# Patient Record
Sex: MALE | Race: BLACK OR AFRICAN AMERICAN | NOT HISPANIC OR LATINO | URBAN - METROPOLITAN AREA
[De-identification: names, ages, dates, MRNs, and addresses within clinical notes are randomized per-mention and may not be internally consistent; named-entity substitution may affect disease eponyms.]

---

## 2017-07-28 ENCOUNTER — INPATIENT (INPATIENT)
Facility: HOSPITAL | Age: 72
LOS: 5 days | Discharge: HOME | End: 2017-08-03
Attending: INTERNAL MEDICINE

## 2017-07-28 DIAGNOSIS — K59.00 CONSTIPATION, UNSPECIFIED: ICD-10-CM

## 2017-08-05 DIAGNOSIS — I12.9 HYPERTENSIVE CHRONIC KIDNEY DISEASE WITH STAGE 1 THROUGH STAGE 4 CHRONIC KIDNEY DISEASE, OR UNSPECIFIED CHRONIC KIDNEY DISEASE: ICD-10-CM

## 2017-08-05 DIAGNOSIS — F43.10 POST-TRAUMATIC STRESS DISORDER, UNSPECIFIED: ICD-10-CM

## 2017-08-05 DIAGNOSIS — N17.9 ACUTE KIDNEY FAILURE, UNSPECIFIED: ICD-10-CM

## 2017-08-05 DIAGNOSIS — Z87.891 PERSONAL HISTORY OF NICOTINE DEPENDENCE: ICD-10-CM

## 2017-08-05 DIAGNOSIS — D53.8 OTHER SPECIFIED NUTRITIONAL ANEMIAS: ICD-10-CM

## 2017-08-05 DIAGNOSIS — C23 MALIGNANT NEOPLASM OF GALLBLADDER: ICD-10-CM

## 2017-08-05 DIAGNOSIS — Z86.73 PERSONAL HISTORY OF TRANSIENT ISCHEMIC ATTACK (TIA), AND CEREBRAL INFARCTION WITHOUT RESIDUAL DEFICITS: ICD-10-CM

## 2017-08-05 DIAGNOSIS — E46 UNSPECIFIED PROTEIN-CALORIE MALNUTRITION: ICD-10-CM

## 2017-08-05 DIAGNOSIS — E83.52 HYPERCALCEMIA: ICD-10-CM

## 2017-08-05 DIAGNOSIS — K31.1 ADULT HYPERTROPHIC PYLORIC STENOSIS: ICD-10-CM

## 2017-08-05 DIAGNOSIS — N18.9 CHRONIC KIDNEY DISEASE, UNSPECIFIED: ICD-10-CM

## 2017-08-05 DIAGNOSIS — N18.4 CHRONIC KIDNEY DISEASE, STAGE 4 (SEVERE): ICD-10-CM

## 2017-08-05 DIAGNOSIS — K82.9 DISEASE OF GALLBLADDER, UNSPECIFIED: ICD-10-CM

## 2017-08-05 DIAGNOSIS — N40.0 BENIGN PROSTATIC HYPERPLASIA WITHOUT LOWER URINARY TRACT SYMPTOMS: ICD-10-CM

## 2017-08-05 DIAGNOSIS — C78.7 SECONDARY MALIGNANT NEOPLASM OF LIVER AND INTRAHEPATIC BILE DUCT: ICD-10-CM

## 2017-08-05 DIAGNOSIS — K59.00 CONSTIPATION, UNSPECIFIED: ICD-10-CM

## 2017-08-05 DIAGNOSIS — R59.0 LOCALIZED ENLARGED LYMPH NODES: ICD-10-CM

## 2017-08-14 ENCOUNTER — APPOINTMENT (OUTPATIENT)
Dept: HEMATOLOGY ONCOLOGY | Facility: CLINIC | Age: 72
End: 2017-08-14

## 2017-08-14 VITALS
BODY MASS INDEX: 27.63 KG/M2 | SYSTOLIC BLOOD PRESSURE: 159 MMHG | DIASTOLIC BLOOD PRESSURE: 83 MMHG | WEIGHT: 193 LBS | HEART RATE: 76 BPM | HEIGHT: 70 IN | RESPIRATION RATE: 16 BRPM | TEMPERATURE: 96.8 F

## 2017-08-14 DIAGNOSIS — E78.5 HYPERLIPIDEMIA, UNSPECIFIED: ICD-10-CM

## 2017-08-14 DIAGNOSIS — Z80.0 FAMILY HISTORY OF MALIGNANT NEOPLASM OF DIGESTIVE ORGANS: ICD-10-CM

## 2017-08-14 DIAGNOSIS — F41.9 ANXIETY DISORDER, UNSPECIFIED: ICD-10-CM

## 2017-08-14 DIAGNOSIS — Z87.898 PERSONAL HISTORY OF OTHER SPECIFIED CONDITIONS: ICD-10-CM

## 2017-08-14 DIAGNOSIS — Z86.59 PERSONAL HISTORY OF OTHER MENTAL AND BEHAVIORAL DISORDERS: ICD-10-CM

## 2017-08-14 DIAGNOSIS — Z86.73 PERSONAL HISTORY OF TRANSIENT ISCHEMIC ATTACK (TIA), AND CEREBRAL INFARCTION W/OUT RESIDUAL DEFICITS: ICD-10-CM

## 2017-08-14 DIAGNOSIS — Z87.438 PERSONAL HISTORY OF OTHER DISEASES OF MALE GENITAL ORGANS: ICD-10-CM

## 2017-08-14 DIAGNOSIS — Z80.3 FAMILY HISTORY OF MALIGNANT NEOPLASM OF BREAST: ICD-10-CM

## 2017-08-14 DIAGNOSIS — I10 ESSENTIAL (PRIMARY) HYPERTENSION: ICD-10-CM

## 2017-08-14 DIAGNOSIS — Z87.891 PERSONAL HISTORY OF NICOTINE DEPENDENCE: ICD-10-CM

## 2017-08-14 DIAGNOSIS — Z77.098 CONTACT WITH AND (SUSPECTED) EXPOSURE TO OTHER HAZARDOUS, CHIEFLY NONMEDICINAL, CHEMICALS: ICD-10-CM

## 2017-08-14 RX ORDER — FINASTERIDE 1 MG/1
1 TABLET ORAL
Refills: 0 | Status: ACTIVE | COMMUNITY

## 2017-08-14 RX ORDER — DOCUSATE SODIUM 100 MG/1
100 CAPSULE ORAL
Refills: 0 | Status: ACTIVE | COMMUNITY

## 2017-08-14 RX ORDER — ALPRAZOLAM 0.25 MG/1
0.25 TABLET ORAL
Qty: 2 | Refills: 0 | Status: ACTIVE | COMMUNITY
Start: 2017-08-14 | End: 1900-01-01

## 2017-08-14 RX ORDER — ASPIRIN 81 MG/1
81 TABLET, CHEWABLE ORAL
Refills: 0 | Status: ACTIVE | COMMUNITY

## 2017-08-14 RX ORDER — TERAZOSIN 10 MG/1
10 CAPSULE ORAL
Refills: 0 | Status: ACTIVE | COMMUNITY

## 2017-08-14 RX ORDER — ATORVASTATIN CALCIUM 10 MG/1
10 TABLET, FILM COATED ORAL
Refills: 0 | Status: ACTIVE | COMMUNITY

## 2017-08-14 RX ORDER — TRAZODONE HYDROCHLORIDE 50 MG/1
50 TABLET ORAL
Refills: 0 | Status: ACTIVE | COMMUNITY

## 2017-08-14 RX ORDER — SENNOSIDES 8.6 MG TABLETS 8.6 MG/1
8.6 TABLET ORAL
Refills: 0 | Status: ACTIVE | COMMUNITY

## 2017-08-15 RX ORDER — PROCHLORPERAZINE MALEATE 10 MG/1
10 TABLET ORAL
Qty: 120 | Refills: 3 | Status: ACTIVE | COMMUNITY
Start: 2017-08-14 | End: 1900-01-01

## 2017-08-19 ENCOUNTER — OUTPATIENT (OUTPATIENT)
Dept: OUTPATIENT SERVICES | Facility: HOSPITAL | Age: 72
LOS: 1 days | Discharge: HOME | End: 2017-08-19

## 2017-08-19 DIAGNOSIS — C7A.8 OTHER MALIGNANT NEUROENDOCRINE TUMORS: ICD-10-CM

## 2017-08-19 DIAGNOSIS — K59.00 CONSTIPATION, UNSPECIFIED: ICD-10-CM

## 2017-08-21 ENCOUNTER — RX RENEWAL (OUTPATIENT)
Age: 72
End: 2017-08-21

## 2017-08-21 ENCOUNTER — APPOINTMENT (OUTPATIENT)
Dept: HEMATOLOGY ONCOLOGY | Facility: CLINIC | Age: 72
End: 2017-08-21

## 2017-08-21 ENCOUNTER — OTHER (OUTPATIENT)
Age: 72
End: 2017-08-21

## 2017-08-21 RX ORDER — ONDANSETRON 8 MG/1
8 TABLET ORAL EVERY 8 HOURS
Qty: 30 | Refills: 3 | Status: ACTIVE | COMMUNITY
Start: 2017-08-21 | End: 1900-01-01

## 2017-08-22 ENCOUNTER — APPOINTMENT (OUTPATIENT)
Dept: INFUSION THERAPY | Facility: CLINIC | Age: 72
End: 2017-08-22

## 2017-08-22 LAB
BASOPHILS # BLD: 0.12 TH/MM3
BASOPHILS NFR BLD: 1.6 %
EOSINOPHIL # BLD: 0.13 TH/MM3
EOSINOPHIL NFR BLD: 1.7 %
ERYTHROCYTE [DISTWIDTH] IN BLOOD BY AUTOMATED COUNT: 17.7 %
GRANULOCYTES # BLD: 4.55 TH/MM3
GRANULOCYTES NFR BLD: 59 %
HCT VFR BLD AUTO: 28.8 %
HGB BLD-MCNC: 10.1 G/DL
IMM GRANULOCYTES # BLD: 0.14 TH/MM3
IMM GRANULOCYTES NFR BLD: 1.8 %
LYMPHOCYTES # BLD: 1.99 TH/MM3
LYMPHOCYTES NFR BLD: 25.8 %
MCH RBC QN AUTO: 26.4 PG
MCHC RBC AUTO-ENTMCNC: 35.1 G/DL
MCV RBC AUTO: 75.2 FL
MONOCYTES # BLD: 0.78 TH/MM3
MONOCYTES NFR BLD: 10.1 %
PLATELET # BLD: 219 TH/MM3
PMV BLD AUTO: 11.7 FL
RBC # BLD AUTO: 3.83 MIL/MM3
WBC # BLD: 7.71 TH/MM3

## 2017-08-23 ENCOUNTER — RX RENEWAL (OUTPATIENT)
Age: 72
End: 2017-08-23

## 2017-08-23 ENCOUNTER — APPOINTMENT (OUTPATIENT)
Dept: INFUSION THERAPY | Facility: CLINIC | Age: 72
End: 2017-08-23

## 2017-08-23 RX ORDER — CHLORPROMAZINE HYDROCHLORIDE 25 MG/1
25 TABLET, SUGAR COATED ORAL 3 TIMES DAILY
Qty: 12 | Refills: 0 | Status: ACTIVE | COMMUNITY
Start: 2017-08-23 | End: 1900-01-01

## 2017-08-24 ENCOUNTER — APPOINTMENT (OUTPATIENT)
Dept: INFUSION THERAPY | Facility: CLINIC | Age: 72
End: 2017-08-24

## 2017-08-24 ENCOUNTER — OUTPATIENT (OUTPATIENT)
Dept: OUTPATIENT SERVICES | Facility: HOSPITAL | Age: 72
LOS: 1 days | Discharge: HOME | End: 2017-08-24

## 2017-08-24 DIAGNOSIS — K59.00 CONSTIPATION, UNSPECIFIED: ICD-10-CM

## 2017-08-24 DIAGNOSIS — C7A.8 OTHER MALIGNANT NEUROENDOCRINE TUMORS: ICD-10-CM

## 2017-08-25 ENCOUNTER — APPOINTMENT (OUTPATIENT)
Dept: INFUSION THERAPY | Facility: CLINIC | Age: 72
End: 2017-08-25

## 2017-08-28 DIAGNOSIS — Z02.9 ENCOUNTER FOR ADMINISTRATIVE EXAMINATIONS, UNSPECIFIED: ICD-10-CM

## 2017-09-12 ENCOUNTER — APPOINTMENT (OUTPATIENT)
Dept: INFUSION THERAPY | Facility: CLINIC | Age: 72
End: 2017-09-12

## 2017-09-12 ENCOUNTER — RESULT REVIEW (OUTPATIENT)
Age: 72
End: 2017-09-12

## 2017-09-12 ENCOUNTER — APPOINTMENT (OUTPATIENT)
Dept: HEMATOLOGY ONCOLOGY | Facility: CLINIC | Age: 72
End: 2017-09-12

## 2017-09-12 VITALS
HEART RATE: 78 BPM | TEMPERATURE: 97.7 F | WEIGHT: 177 LBS | HEIGHT: 70 IN | SYSTOLIC BLOOD PRESSURE: 161 MMHG | DIASTOLIC BLOOD PRESSURE: 76 MMHG | BODY MASS INDEX: 25.34 KG/M2

## 2017-09-13 ENCOUNTER — APPOINTMENT (OUTPATIENT)
Dept: INFUSION THERAPY | Facility: CLINIC | Age: 72
End: 2017-09-13

## 2017-09-13 LAB
ALBUMIN SERPL-MCNC: 3.7 G/DL
ALBUMIN/GLOB SERPL: 1.42
ALP SERPL-CCNC: 96 IU/L
ALT SERPL-CCNC: 17 IU/L
ANION GAP SERPL CALC-SCNC: 9 MEQ/L
AST SERPL-CCNC: 22 IU/L
BASOPHILS # BLD: 0.12 TH/MM3
BASOPHILS NFR BLD: 1 %
BILIRUB SERPL-MCNC: 0.7 MG/DL
BUN SERPL-MCNC: 13 MG/DL
BUN/CREAT SERPL: 6.9 %
CALCIUM SERPL-MCNC: 10.2 MG/DL
CHLORIDE SERPL-SCNC: 107 MEQ/L
CO2 SERPL-SCNC: 24 MEQ/L
CREAT SERPL-MCNC: 1.88 MG/DL
EOSINOPHIL # BLD: 0.03 TH/MM3
EOSINOPHIL NFR BLD: 0.2 %
ERYTHROCYTE [DISTWIDTH] IN BLOOD BY AUTOMATED COUNT: 20.5 %
FERRITIN SERPL-MCNC: 164 NG/ML
GFR SERPL CREATININE-BSD FRML MDRD: 43
GLUCOSE SERPL-MCNC: 89 MG/DL
GRANULOCYTES # BLD: 7.96 TH/MM3
GRANULOCYTES NFR BLD: 63.9 %
HCT VFR BLD AUTO: 25.6 %
HGB BLD-MCNC: 8.9 G/DL
IMM GRANULOCYTES # BLD: 0.24 TH/MM3
IMM GRANULOCYTES NFR BLD: 1.9 %
IRON SERPL-MCNC: 37 UG/DL
LYMPHOCYTES # BLD: 2.5 TH/MM3
LYMPHOCYTES NFR BLD: 20 %
MCH RBC QN AUTO: 26.9 PG
MCHC RBC AUTO-ENTMCNC: 34.8 G/DL
MCV RBC AUTO: 77.3 FL
MONOCYTES # BLD: 1.62 TH/MM3
MONOCYTES NFR BLD: 13 %
PLATELET # BLD: 347 TH/MM3
PMV BLD AUTO: 10.8 FL
POTASSIUM SERPL-SCNC: 4.4 MMOL/L
PROT SERPL-MCNC: 6.3 G/DL
RBC # BLD AUTO: 3.31 MIL/MM3
SODIUM SERPL-SCNC: 140 MEQ/L
TIBC SERPL-MCNC: 294 UG/DL
WBC # BLD: 12.47 TH/MM3

## 2017-09-14 ENCOUNTER — APPOINTMENT (OUTPATIENT)
Dept: INFUSION THERAPY | Facility: CLINIC | Age: 72
End: 2017-09-14

## 2017-09-15 ENCOUNTER — APPOINTMENT (OUTPATIENT)
Dept: INFUSION THERAPY | Facility: CLINIC | Age: 72
End: 2017-09-15

## 2017-10-03 ENCOUNTER — RESULT REVIEW (OUTPATIENT)
Age: 72
End: 2017-10-03

## 2017-10-03 ENCOUNTER — APPOINTMENT (OUTPATIENT)
Dept: HEMATOLOGY ONCOLOGY | Facility: CLINIC | Age: 72
End: 2017-10-03

## 2017-10-03 ENCOUNTER — APPOINTMENT (OUTPATIENT)
Dept: INFUSION THERAPY | Facility: CLINIC | Age: 72
End: 2017-10-03

## 2017-10-03 VITALS
RESPIRATION RATE: 14 BRPM | BODY MASS INDEX: 25.91 KG/M2 | WEIGHT: 181 LBS | TEMPERATURE: 98.4 F | DIASTOLIC BLOOD PRESSURE: 67 MMHG | HEART RATE: 72 BPM | SYSTOLIC BLOOD PRESSURE: 155 MMHG | HEIGHT: 70 IN

## 2017-10-03 LAB
BASOPHILS # BLD: 0.06 TH/MM3
BASOPHILS NFR BLD: 0.6 %
EOSINOPHIL # BLD: 0.07 TH/MM3
EOSINOPHIL NFR BLD: 0.7 %
ERYTHROCYTE [DISTWIDTH] IN BLOOD BY AUTOMATED COUNT: 26.1 %
GRANULOCYTES # BLD: 6.55 TH/MM3
GRANULOCYTES NFR BLD: 64.1 %
HCT VFR BLD AUTO: 23 %
HGB BLD-MCNC: 7.8 G/DL
IMM GRANULOCYTES # BLD: 0.12 TH/MM3
IMM GRANULOCYTES NFR BLD: 1.2 %
LYMPHOCYTES # BLD: 2.26 TH/MM3
LYMPHOCYTES NFR BLD: 22.2 %
MCH RBC QN AUTO: 28.1 PG
MCHC RBC AUTO-ENTMCNC: 33.9 G/DL
MCV RBC AUTO: 82.7 FL
MONOCYTES # BLD: 1.14 TH/MM3
MONOCYTES NFR BLD: 11.2 %
PLATELET # BLD: 187 TH/MM3
PMV BLD AUTO: 9.9 FL
RBC # BLD AUTO: 2.78 MIL/MM3
WBC # BLD: 10.2 TH/MM3

## 2017-10-04 ENCOUNTER — OTHER (OUTPATIENT)
Age: 72
End: 2017-10-04

## 2017-10-04 ENCOUNTER — APPOINTMENT (OUTPATIENT)
Dept: INFUSION THERAPY | Facility: CLINIC | Age: 72
End: 2017-10-04

## 2017-10-04 LAB
ALBUMIN SERPL-MCNC: 3.8 G/DL
ALBUMIN/GLOB SERPL: 1.41
ALP SERPL-CCNC: 83 IU/L
ALT SERPL-CCNC: 15 IU/L
ANION GAP SERPL CALC-SCNC: 10 MEQ/L
AST SERPL-CCNC: 27 IU/L
BILIRUB SERPL-MCNC: 0.6 MG/DL
BUN SERPL-MCNC: 19 MG/DL
BUN/CREAT SERPL: 8.8 %
CALCIUM SERPL-MCNC: 9.4 MG/DL
CHLORIDE SERPL-SCNC: 105 MEQ/L
CO2 SERPL-SCNC: 25 MEQ/L
CREAT SERPL-MCNC: 2.16 MG/DL
GFR SERPL CREATININE-BSD FRML MDRD: 37
GLUCOSE SERPL-MCNC: 103 MG/DL
POTASSIUM SERPL-SCNC: 4.3 MMOL/L
PROT SERPL-MCNC: 6.5 G/DL
SODIUM SERPL-SCNC: 140 MEQ/L

## 2017-10-05 ENCOUNTER — APPOINTMENT (OUTPATIENT)
Dept: INFUSION THERAPY | Facility: CLINIC | Age: 72
End: 2017-10-05

## 2017-10-06 ENCOUNTER — APPOINTMENT (OUTPATIENT)
Dept: INFUSION THERAPY | Facility: CLINIC | Age: 72
End: 2017-10-06

## 2017-10-12 LAB
ABO + RH BLD: NORMAL
BLD GP AB SCN SERPL QL: NORMAL

## 2017-10-20 ENCOUNTER — OUTPATIENT (OUTPATIENT)
Dept: OUTPATIENT SERVICES | Facility: HOSPITAL | Age: 72
LOS: 1 days | Discharge: HOME | End: 2017-10-20

## 2017-10-20 DIAGNOSIS — K59.00 CONSTIPATION, UNSPECIFIED: ICD-10-CM

## 2017-10-20 DIAGNOSIS — C7A.8 OTHER MALIGNANT NEUROENDOCRINE TUMORS: ICD-10-CM

## 2017-10-24 ENCOUNTER — APPOINTMENT (OUTPATIENT)
Dept: HEMATOLOGY ONCOLOGY | Facility: CLINIC | Age: 72
End: 2017-10-24

## 2017-10-24 ENCOUNTER — APPOINTMENT (OUTPATIENT)
Dept: INFUSION THERAPY | Facility: CLINIC | Age: 72
End: 2017-10-24

## 2017-10-24 VITALS
BODY MASS INDEX: 25.77 KG/M2 | RESPIRATION RATE: 14 BRPM | TEMPERATURE: 97.5 F | SYSTOLIC BLOOD PRESSURE: 146 MMHG | HEART RATE: 72 BPM | WEIGHT: 180 LBS | DIASTOLIC BLOOD PRESSURE: 75 MMHG | HEIGHT: 70 IN

## 2017-10-24 DIAGNOSIS — M25.512 PAIN IN LEFT SHOULDER: ICD-10-CM

## 2017-10-24 LAB
ALBUMIN SERPL-MCNC: 3.8 G/DL
ALBUMIN/GLOB SERPL: 1.36
ALP SERPL-CCNC: 82 IU/L
ALT SERPL-CCNC: 16 IU/L
ANION GAP SERPL CALC-SCNC: 7 MEQ/L
AST SERPL-CCNC: 24 IU/L
BASOPHILS # BLD: 0.03 TH/MM3
BASOPHILS NFR BLD: 0.4 %
BILIRUB SERPL-MCNC: 0.5 MG/DL
BUN SERPL-MCNC: 9 MG/DL
BUN/CREAT SERPL: 5.4 %
CALCIUM SERPL-MCNC: 9.7 MG/DL
CHLORIDE SERPL-SCNC: 106 MEQ/L
CO2 SERPL-SCNC: 26 MEQ/L
CREAT SERPL-MCNC: 1.67 MG/DL
EOSINOPHIL # BLD: 0.11 TH/MM3
EOSINOPHIL NFR BLD: 1.3 %
ERYTHROCYTE [DISTWIDTH] IN BLOOD BY AUTOMATED COUNT: 23.9 %
GFR SERPL CREATININE-BSD FRML MDRD: 49
GLUCOSE SERPL-MCNC: 116 MG/DL
GRANULOCYTES # BLD: 5.11 TH/MM3
GRANULOCYTES NFR BLD: 61.5 %
HCT VFR BLD AUTO: 26 %
HGB BLD-MCNC: 8.8 G/DL
IMM GRANULOCYTES # BLD: 0.01 TH/MM3
IMM GRANULOCYTES NFR BLD: 0.1 %
LYMPHOCYTES # BLD: 2.01 TH/MM3
LYMPHOCYTES NFR BLD: 24.2 %
MCH RBC QN AUTO: 29.6 PG
MCHC RBC AUTO-ENTMCNC: 33.8 G/DL
MCV RBC AUTO: 87.5 FL
MONOCYTES # BLD: 1.04 TH/MM3
MONOCYTES NFR BLD: 12.5 %
PLATELET # BLD: 127 TH/MM3
PMV BLD AUTO: 9 FL
POTASSIUM SERPL-SCNC: 4.4 MMOL/L
PROT SERPL-MCNC: 6.6 G/DL
RBC # BLD AUTO: 2.97 MIL/MM3
SODIUM SERPL-SCNC: 139 MEQ/L
WBC # BLD: 8.31 TH/MM3

## 2017-10-25 ENCOUNTER — APPOINTMENT (OUTPATIENT)
Dept: INFUSION THERAPY | Facility: CLINIC | Age: 72
End: 2017-10-25

## 2017-10-26 ENCOUNTER — APPOINTMENT (OUTPATIENT)
Dept: INFUSION THERAPY | Facility: CLINIC | Age: 72
End: 2017-10-26

## 2017-10-27 ENCOUNTER — APPOINTMENT (OUTPATIENT)
Dept: INFUSION THERAPY | Facility: CLINIC | Age: 72
End: 2017-10-27

## 2017-10-31 ENCOUNTER — APPOINTMENT (OUTPATIENT)
Dept: HEMATOLOGY ONCOLOGY | Facility: CLINIC | Age: 72
End: 2017-10-31

## 2017-10-31 LAB
BASOPHILS # BLD: 0.04 TH/MM3
BASOPHILS NFR BLD: 0.4 %
EOSINOPHIL # BLD: 0.11 TH/MM3
EOSINOPHIL NFR BLD: 1.2 %
ERYTHROCYTE [DISTWIDTH] IN BLOOD BY AUTOMATED COUNT: 22.7 %
GRANULOCYTES # BLD: 7.06 TH/MM3
GRANULOCYTES NFR BLD: 74.8 %
HCT VFR BLD AUTO: 23.5 %
HGB BLD-MCNC: 8 G/DL
IMM GRANULOCYTES # BLD: 0.25 TH/MM3
IMM GRANULOCYTES NFR BLD: 2.7 %
LYMPHOCYTES # BLD: 1.49 TH/MM3
LYMPHOCYTES NFR BLD: 15.8 %
MCH RBC QN AUTO: 30.1 PG
MCHC RBC AUTO-ENTMCNC: 34 G/DL
MCV RBC AUTO: 88.3 FL
MONOCYTES # BLD: 0.48 TH/MM3
MONOCYTES NFR BLD: 5.1 %
PLATELET # BLD: 190 TH/MM3
PMV BLD AUTO: 9.8 FL
RBC # BLD AUTO: 2.66 MIL/MM3
WBC # BLD: 9.43 TH/MM3

## 2017-11-07 ENCOUNTER — APPOINTMENT (OUTPATIENT)
Dept: HEMATOLOGY ONCOLOGY | Facility: CLINIC | Age: 72
End: 2017-11-07

## 2017-11-07 LAB
BASOPHILS # BLD: 0.03 TH/MM3
BASOPHILS NFR BLD: 0.4 %
EOSINOPHIL # BLD: 0.13 TH/MM3
EOSINOPHIL NFR BLD: 1.7 %
ERYTHROCYTE [DISTWIDTH] IN BLOOD BY AUTOMATED COUNT: 23 %
GRANULOCYTES # BLD: 4.55 TH/MM3
GRANULOCYTES NFR BLD: 60.1 %
HCT VFR BLD AUTO: 23.9 %
HGB BLD-MCNC: 8.1 G/DL
IMM GRANULOCYTES # BLD: 0.05 TH/MM3
IMM GRANULOCYTES NFR BLD: 0.7 %
LYMPHOCYTES # BLD: 2.02 TH/MM3
LYMPHOCYTES NFR BLD: 26.7 %
MCH RBC QN AUTO: 30.3 PG
MCHC RBC AUTO-ENTMCNC: 33.9 G/DL
MCV RBC AUTO: 89.5 FL
MONOCYTES # BLD: 0.79 TH/MM3
MONOCYTES NFR BLD: 10.4 %
PLATELET # BLD: 96 TH/MM3
PMV BLD AUTO: 8.9 FL
RBC # BLD AUTO: 2.67 MIL/MM3
WBC # BLD: 7.57 TH/MM3

## 2017-11-14 ENCOUNTER — RESULT REVIEW (OUTPATIENT)
Age: 72
End: 2017-11-14

## 2017-11-14 ENCOUNTER — APPOINTMENT (OUTPATIENT)
Dept: INFUSION THERAPY | Facility: CLINIC | Age: 72
End: 2017-11-14

## 2017-11-14 ENCOUNTER — APPOINTMENT (OUTPATIENT)
Dept: HEMATOLOGY ONCOLOGY | Facility: CLINIC | Age: 72
End: 2017-11-14

## 2017-11-14 VITALS
SYSTOLIC BLOOD PRESSURE: 188 MMHG | WEIGHT: 186 LBS | HEART RATE: 66 BPM | RESPIRATION RATE: 14 BRPM | BODY MASS INDEX: 26.63 KG/M2 | HEIGHT: 70 IN | TEMPERATURE: 97.4 F | DIASTOLIC BLOOD PRESSURE: 92 MMHG

## 2017-11-15 ENCOUNTER — APPOINTMENT (OUTPATIENT)
Dept: INFUSION THERAPY | Facility: CLINIC | Age: 72
End: 2017-11-15

## 2017-11-15 ENCOUNTER — OUTPATIENT (OUTPATIENT)
Dept: OUTPATIENT SERVICES | Facility: HOSPITAL | Age: 72
LOS: 1 days | Discharge: HOME | End: 2017-11-15

## 2017-11-15 DIAGNOSIS — Z51.11 ENCOUNTER FOR ANTINEOPLASTIC CHEMOTHERAPY: ICD-10-CM

## 2017-11-15 DIAGNOSIS — Z51.89 ENCOUNTER FOR OTHER SPECIFIED AFTERCARE: ICD-10-CM

## 2017-11-15 DIAGNOSIS — C23 MALIGNANT NEOPLASM OF GALLBLADDER: ICD-10-CM

## 2017-11-15 DIAGNOSIS — C7A.8 OTHER MALIGNANT NEUROENDOCRINE TUMORS: ICD-10-CM

## 2017-11-16 ENCOUNTER — APPOINTMENT (OUTPATIENT)
Dept: INFUSION THERAPY | Facility: CLINIC | Age: 72
End: 2017-11-16

## 2017-11-17 ENCOUNTER — APPOINTMENT (OUTPATIENT)
Dept: INFUSION THERAPY | Facility: CLINIC | Age: 72
End: 2017-11-17

## 2017-11-20 ENCOUNTER — RX RENEWAL (OUTPATIENT)
Age: 72
End: 2017-11-20

## 2017-11-20 RX ORDER — ONDANSETRON 8 MG/1
8 TABLET ORAL
Qty: 30 | Refills: 5 | Status: ACTIVE | COMMUNITY
Start: 2017-08-23 | End: 1900-01-01

## 2017-11-21 ENCOUNTER — RESULT REVIEW (OUTPATIENT)
Age: 72
End: 2017-11-21

## 2017-11-21 ENCOUNTER — APPOINTMENT (OUTPATIENT)
Dept: HEMATOLOGY ONCOLOGY | Facility: CLINIC | Age: 72
End: 2017-11-21

## 2017-11-21 ENCOUNTER — OTHER (OUTPATIENT)
Age: 72
End: 2017-11-21

## 2017-11-22 ENCOUNTER — APPOINTMENT (OUTPATIENT)
Dept: HEMATOLOGY ONCOLOGY | Facility: CLINIC | Age: 72
End: 2017-11-22

## 2017-11-22 ENCOUNTER — RESULT REVIEW (OUTPATIENT)
Age: 72
End: 2017-11-22

## 2017-11-22 ENCOUNTER — APPOINTMENT (OUTPATIENT)
Dept: VASCULAR SURGERY | Facility: CLINIC | Age: 72
End: 2017-11-22

## 2017-11-22 ENCOUNTER — APPOINTMENT (OUTPATIENT)
Dept: INFUSION THERAPY | Facility: CLINIC | Age: 72
End: 2017-11-22

## 2017-11-28 ENCOUNTER — APPOINTMENT (OUTPATIENT)
Dept: HEMATOLOGY ONCOLOGY | Facility: CLINIC | Age: 72
End: 2017-11-28

## 2017-11-28 LAB
ABO + RH BLD: NORMAL
BLD GP AB SCN SERPL QL: NORMAL
R#: NORMAL

## 2017-11-29 LAB
BASOPHILS # BLD: 0.05 TH/MM3
BASOPHILS NFR BLD: 0.5 %
EOSINOPHIL # BLD: 0.21 TH/MM3
EOSINOPHIL NFR BLD: 2.3 %
ERYTHROCYTE [DISTWIDTH] IN BLOOD BY AUTOMATED COUNT: 19.1 %
GRANULOCYTES # BLD: 6.27 TH/MM3
GRANULOCYTES NFR BLD: 67.5 %
HCT VFR BLD AUTO: 28 %
HGB BLD-MCNC: 9.6 G/DL
IMM GRANULOCYTES # BLD: 0.03 TH/MM3
IMM GRANULOCYTES NFR BLD: 0.3 %
LYMPHOCYTES # BLD: 1.77 TH/MM3
LYMPHOCYTES NFR BLD: 19.1 %
MCH RBC QN AUTO: 30.5 PG
MCHC RBC AUTO-ENTMCNC: 34.3 G/DL
MCV RBC AUTO: 88.9 FL
MONOCYTES # BLD: 0.96 TH/MM3
MONOCYTES NFR BLD: 10.3 %
PLATELET # BLD: 87 TH/MM3
PMV BLD AUTO: 9.9 FL
RBC # BLD AUTO: 3.15 MIL/MM3
WBC # BLD: 9.29 TH/MM3

## 2017-12-05 ENCOUNTER — APPOINTMENT (OUTPATIENT)
Dept: HEMATOLOGY ONCOLOGY | Facility: CLINIC | Age: 72
End: 2017-12-05

## 2017-12-05 ENCOUNTER — APPOINTMENT (OUTPATIENT)
Dept: INFUSION THERAPY | Facility: CLINIC | Age: 72
End: 2017-12-05

## 2017-12-05 VITALS
TEMPERATURE: 98.4 F | HEART RATE: 71 BPM | BODY MASS INDEX: 25.77 KG/M2 | SYSTOLIC BLOOD PRESSURE: 138 MMHG | WEIGHT: 180 LBS | DIASTOLIC BLOOD PRESSURE: 64 MMHG | HEIGHT: 70 IN | RESPIRATION RATE: 14 BRPM

## 2017-12-05 LAB
BASOPHILS # BLD: 0.09 TH/MM3
BASOPHILS NFR BLD: 0.8 %
EOSINOPHIL # BLD: 0.16 TH/MM3
EOSINOPHIL NFR BLD: 1.5 %
ERYTHROCYTE [DISTWIDTH] IN BLOOD BY AUTOMATED COUNT: 18.7 %
GRANULOCYTES # BLD: 6.56 TH/MM3
GRANULOCYTES NFR BLD: 61.6 %
HCT VFR BLD AUTO: 27.8 %
HGB BLD-MCNC: 9.5 G/DL
IMM GRANULOCYTES # BLD: 0.08 TH/MM3
IMM GRANULOCYTES NFR BLD: 0.8 %
LYMPHOCYTES # BLD: 2.15 TH/MM3
LYMPHOCYTES NFR BLD: 20.2 %
MCH RBC QN AUTO: 30.4 PG
MCHC RBC AUTO-ENTMCNC: 34.2 G/DL
MCV RBC AUTO: 88.8 FL
MONOCYTES # BLD: 1.61 TH/MM3
MONOCYTES NFR BLD: 15.1 %
PLATELET # BLD: 130 TH/MM3
PMV BLD AUTO: 9.7 FL
RBC # BLD AUTO: 3.13 MIL/MM3
WBC # BLD: 10.65 TH/MM3

## 2017-12-06 ENCOUNTER — APPOINTMENT (OUTPATIENT)
Dept: INFUSION THERAPY | Facility: CLINIC | Age: 72
End: 2017-12-06

## 2017-12-06 LAB
ALBUMIN SERPL-MCNC: 3.8 G/DL
ALBUMIN/GLOB SERPL: 1.73
ALP SERPL-CCNC: 94 IU/L
ALT SERPL-CCNC: 17 IU/L
ANION GAP SERPL CALC-SCNC: 12 MEQ/L
AST SERPL-CCNC: 67 IU/L
BILIRUB SERPL-MCNC: 2.2 MG/DL
BUN SERPL-MCNC: 18 MG/DL
BUN/CREAT SERPL: 9.8 %
CALCIUM SERPL-MCNC: 9.1 MG/DL
CHLORIDE SERPL-SCNC: 106 MEQ/L
CO2 SERPL-SCNC: 21 MEQ/L
CREAT SERPL-MCNC: 1.83 MG/DL
GFR SERPL CREATININE-BSD FRML MDRD: 44
GLUCOSE SERPL-MCNC: 78 MG/DL
POTASSIUM SERPL-SCNC: 5.6 MMOL/L
PROT SERPL-MCNC: 6 G/DL
SODIUM SERPL-SCNC: 139 MEQ/L

## 2017-12-07 ENCOUNTER — APPOINTMENT (OUTPATIENT)
Dept: INFUSION THERAPY | Facility: CLINIC | Age: 72
End: 2017-12-07

## 2017-12-08 ENCOUNTER — APPOINTMENT (OUTPATIENT)
Dept: INFUSION THERAPY | Facility: CLINIC | Age: 72
End: 2017-12-08

## 2017-12-12 ENCOUNTER — APPOINTMENT (OUTPATIENT)
Dept: HEMATOLOGY ONCOLOGY | Facility: CLINIC | Age: 72
End: 2017-12-12

## 2017-12-19 ENCOUNTER — APPOINTMENT (OUTPATIENT)
Dept: HEMATOLOGY ONCOLOGY | Facility: CLINIC | Age: 72
End: 2017-12-19

## 2017-12-26 ENCOUNTER — APPOINTMENT (OUTPATIENT)
Dept: HEMATOLOGY ONCOLOGY | Facility: CLINIC | Age: 72
End: 2017-12-26

## 2017-12-26 ENCOUNTER — APPOINTMENT (OUTPATIENT)
Dept: INFUSION THERAPY | Facility: CLINIC | Age: 72
End: 2017-12-26

## 2017-12-26 VITALS
TEMPERATURE: 97.54 F | WEIGHT: 180 LBS | BODY MASS INDEX: 25.77 KG/M2 | HEIGHT: 70 IN | HEART RATE: 71 BPM | RESPIRATION RATE: 14 BRPM | SYSTOLIC BLOOD PRESSURE: 120 MMHG | DIASTOLIC BLOOD PRESSURE: 64 MMHG

## 2017-12-27 ENCOUNTER — APPOINTMENT (OUTPATIENT)
Dept: INFUSION THERAPY | Facility: CLINIC | Age: 72
End: 2017-12-27

## 2017-12-28 ENCOUNTER — APPOINTMENT (OUTPATIENT)
Dept: INFUSION THERAPY | Facility: CLINIC | Age: 72
End: 2017-12-28

## 2017-12-29 ENCOUNTER — APPOINTMENT (OUTPATIENT)
Dept: INFUSION THERAPY | Facility: CLINIC | Age: 72
End: 2017-12-29

## 2018-01-03 LAB
ALBUMIN SERPL-MCNC: 3.8 G/DL
ALBUMIN/GLOB SERPL: 1.41
ALP SERPL-CCNC: 89 IU/L
ALT SERPL-CCNC: 13 IU/L
ANION GAP SERPL CALC-SCNC: 9 MEQ/L
AST SERPL-CCNC: 19 IU/L
BASOPHILS # BLD: 0.04 TH/MM3
BASOPHILS NFR BLD: 0.5 %
BILIRUB SERPL-MCNC: 0.5 MG/DL
BUN SERPL-MCNC: 14 MG/DL
BUN/CREAT SERPL: 9.8 %
CALCIUM SERPL-MCNC: 9.1 MG/DL
CHLORIDE SERPL-SCNC: 104 MEQ/L
CO2 SERPL-SCNC: 25 MEQ/L
CREAT SERPL-MCNC: 1.43 MG/DL
EOSINOPHIL # BLD: 0.08 TH/MM3
EOSINOPHIL NFR BLD: 0.9 %
ERYTHROCYTE [DISTWIDTH] IN BLOOD BY AUTOMATED COUNT: 18.6 %
GFR SERPL CREATININE-BSD FRML MDRD: 59
GLUCOSE SERPL-MCNC: 77 MG/DL
GRANULOCYTES # BLD: 5.64 TH/MM3
GRANULOCYTES NFR BLD: 64.2 %
HCT VFR BLD AUTO: 26 %
HGB BLD-MCNC: 8.8 G/DL
IMM GRANULOCYTES # BLD: 0.08 TH/MM3
IMM GRANULOCYTES NFR BLD: 0.9 %
LYMPHOCYTES # BLD: 1.99 TH/MM3
LYMPHOCYTES NFR BLD: 22.7 %
MCH RBC QN AUTO: 30.7 PG
MCHC RBC AUTO-ENTMCNC: 33.8 G/DL
MCV RBC AUTO: 90.6 FL
MONOCYTES # BLD: 0.95 TH/MM3
MONOCYTES NFR BLD: 10.8 %
PLATELET # BLD: 120 TH/MM3
PMV BLD AUTO: 10.1 FL
POTASSIUM SERPL-SCNC: 4.2 MMOL/L
PROT SERPL-MCNC: 6.5 G/DL
RBC # BLD AUTO: 2.87 MIL/MM3
SODIUM SERPL-SCNC: 138 MEQ/L
WBC # BLD: 8.78 TH/MM3

## 2018-01-04 ENCOUNTER — OUTPATIENT (OUTPATIENT)
Dept: OUTPATIENT SERVICES | Facility: HOSPITAL | Age: 73
LOS: 1 days | Discharge: HOME | End: 2018-01-04

## 2018-01-04 DIAGNOSIS — C7A.8 OTHER MALIGNANT NEUROENDOCRINE TUMORS: ICD-10-CM

## 2018-01-04 DIAGNOSIS — K59.00 CONSTIPATION, UNSPECIFIED: ICD-10-CM

## 2018-01-16 ENCOUNTER — APPOINTMENT (OUTPATIENT)
Dept: HEMATOLOGY ONCOLOGY | Facility: CLINIC | Age: 73
End: 2018-01-16

## 2018-01-16 ENCOUNTER — APPOINTMENT (OUTPATIENT)
Dept: INFUSION THERAPY | Facility: CLINIC | Age: 73
End: 2018-01-16

## 2018-01-16 ENCOUNTER — RESULT REVIEW (OUTPATIENT)
Age: 73
End: 2018-01-16

## 2018-01-16 VITALS
TEMPERATURE: 97.7 F | SYSTOLIC BLOOD PRESSURE: 121 MMHG | RESPIRATION RATE: 14 BRPM | HEIGHT: 70 IN | HEART RATE: 70 BPM | BODY MASS INDEX: 25.48 KG/M2 | WEIGHT: 178 LBS | DIASTOLIC BLOOD PRESSURE: 67 MMHG

## 2018-01-16 LAB
BASOPHILS # BLD: 0.04 TH/MM3
BASOPHILS NFR BLD: 0.5 %
EOSINOPHIL # BLD: 0.1 TH/MM3
EOSINOPHIL NFR BLD: 1.1 %
ERYTHROCYTE [DISTWIDTH] IN BLOOD BY AUTOMATED COUNT: 20.4 %
GRANULOCYTES # BLD: 5.71 TH/MM3
GRANULOCYTES NFR BLD: 64.3 %
HCT VFR BLD AUTO: 23 %
HGB BLD-MCNC: 7.7 G/DL
IMM GRANULOCYTES # BLD: 0.04 TH/MM3
IMM GRANULOCYTES NFR BLD: 0.5 %
LYMPHOCYTES # BLD: 1.88 TH/MM3
LYMPHOCYTES NFR BLD: 21.2 %
MCH RBC QN AUTO: 31.4 PG
MCHC RBC AUTO-ENTMCNC: 33.5 G/DL
MCV RBC AUTO: 93.9 FL
MONOCYTES # BLD: 1.1 TH/MM3
MONOCYTES NFR BLD: 12.4 %
PLATELET # BLD: 115 TH/MM3
PMV BLD AUTO: 10.5 FL
RBC # BLD AUTO: 2.45 MIL/MM3
WBC # BLD: 8.87 TH/MM3

## 2018-01-17 ENCOUNTER — INPATIENT (INPATIENT)
Facility: HOSPITAL | Age: 73
LOS: 4 days | Discharge: HOME | End: 2018-01-22
Attending: ANESTHESIOLOGY

## 2018-01-18 ENCOUNTER — APPOINTMENT (OUTPATIENT)
Dept: INFUSION THERAPY | Facility: CLINIC | Age: 73
End: 2018-01-18

## 2018-01-23 ENCOUNTER — APPOINTMENT (OUTPATIENT)
Dept: INFUSION THERAPY | Facility: CLINIC | Age: 73
End: 2018-01-23

## 2018-01-24 ENCOUNTER — APPOINTMENT (OUTPATIENT)
Dept: INFUSION THERAPY | Facility: CLINIC | Age: 73
End: 2018-01-24

## 2018-01-24 LAB
ABO + RH BLD: NORMAL
BLD GP AB SCN SERPL QL: NORMAL
R#: NORMAL
RBC LEUKO RED IRR REQ (NORTH): NORMAL

## 2018-01-25 ENCOUNTER — APPOINTMENT (OUTPATIENT)
Dept: INFUSION THERAPY | Facility: CLINIC | Age: 73
End: 2018-01-25

## 2018-01-25 ENCOUNTER — OUTPATIENT (OUTPATIENT)
Dept: OUTPATIENT SERVICES | Facility: HOSPITAL | Age: 73
LOS: 1 days | Discharge: HOME | End: 2018-01-25

## 2018-01-25 DIAGNOSIS — C7A.8 OTHER MALIGNANT NEUROENDOCRINE TUMORS: ICD-10-CM

## 2018-01-25 DIAGNOSIS — Z01.818 ENCOUNTER FOR OTHER PREPROCEDURAL EXAMINATION: ICD-10-CM

## 2018-01-26 ENCOUNTER — OUTPATIENT (OUTPATIENT)
Dept: OUTPATIENT SERVICES | Facility: HOSPITAL | Age: 73
LOS: 1 days | Discharge: HOME | End: 2018-01-26

## 2018-01-26 ENCOUNTER — APPOINTMENT (OUTPATIENT)
Dept: INFUSION THERAPY | Facility: CLINIC | Age: 73
End: 2018-01-26

## 2018-01-26 DIAGNOSIS — C7A.8 OTHER MALIGNANT NEUROENDOCRINE TUMORS: ICD-10-CM

## 2018-01-26 DIAGNOSIS — Z86.73 PERSONAL HISTORY OF TRANSIENT ISCHEMIC ATTACK (TIA), AND CEREBRAL INFARCTION WITHOUT RESIDUAL DEFICITS: ICD-10-CM

## 2018-01-26 DIAGNOSIS — Z77.098 CONTACT WITH AND (SUSPECTED) EXPOSURE TO OTHER HAZARDOUS, CHIEFLY NONMEDICINAL, CHEMICALS: ICD-10-CM

## 2018-01-26 DIAGNOSIS — F41.9 ANXIETY DISORDER, UNSPECIFIED: ICD-10-CM

## 2018-01-26 DIAGNOSIS — N40.0 BENIGN PROSTATIC HYPERPLASIA WITHOUT LOWER URINARY TRACT SYMPTOMS: ICD-10-CM

## 2018-01-26 DIAGNOSIS — Z51.11 ENCOUNTER FOR ANTINEOPLASTIC CHEMOTHERAPY: ICD-10-CM

## 2018-01-26 DIAGNOSIS — F43.10 POST-TRAUMATIC STRESS DISORDER, UNSPECIFIED: ICD-10-CM

## 2018-01-26 DIAGNOSIS — Z51.5 ENCOUNTER FOR PALLIATIVE CARE: ICD-10-CM

## 2018-01-26 DIAGNOSIS — E78.5 HYPERLIPIDEMIA, UNSPECIFIED: ICD-10-CM

## 2018-01-26 DIAGNOSIS — C78.7 SECONDARY MALIGNANT NEOPLASM OF LIVER AND INTRAHEPATIC BILE DUCT: ICD-10-CM

## 2018-01-26 DIAGNOSIS — N17.9 ACUTE KIDNEY FAILURE, UNSPECIFIED: ICD-10-CM

## 2018-01-26 DIAGNOSIS — K59.09 OTHER CONSTIPATION: ICD-10-CM

## 2018-01-26 DIAGNOSIS — N18.3 CHRONIC KIDNEY DISEASE, STAGE 3 (MODERATE): ICD-10-CM

## 2018-01-26 DIAGNOSIS — C77.9 SECONDARY AND UNSPECIFIED MALIGNANT NEOPLASM OF LYMPH NODE, UNSPECIFIED: ICD-10-CM

## 2018-01-26 DIAGNOSIS — I12.9 HYPERTENSIVE CHRONIC KIDNEY DISEASE WITH STAGE 1 THROUGH STAGE 4 CHRONIC KIDNEY DISEASE, OR UNSPECIFIED CHRONIC KIDNEY DISEASE: ICD-10-CM

## 2018-01-26 DIAGNOSIS — Z87.891 PERSONAL HISTORY OF NICOTINE DEPENDENCE: ICD-10-CM

## 2018-01-26 DIAGNOSIS — K59.00 CONSTIPATION, UNSPECIFIED: ICD-10-CM

## 2018-01-26 DIAGNOSIS — G47.00 INSOMNIA, UNSPECIFIED: ICD-10-CM

## 2018-01-30 DIAGNOSIS — C23 MALIGNANT NEOPLASM OF GALLBLADDER: ICD-10-CM

## 2018-01-30 DIAGNOSIS — I10 ESSENTIAL (PRIMARY) HYPERTENSION: ICD-10-CM

## 2018-01-30 DIAGNOSIS — K59.00 CONSTIPATION, UNSPECIFIED: ICD-10-CM

## 2018-01-31 ENCOUNTER — APPOINTMENT (OUTPATIENT)
Dept: INFUSION THERAPY | Facility: CLINIC | Age: 73
End: 2018-01-31

## 2018-01-31 ENCOUNTER — APPOINTMENT (OUTPATIENT)
Dept: SURGERY | Facility: CLINIC | Age: 73
End: 2018-01-31
Payer: MEDICARE

## 2018-01-31 ENCOUNTER — APPOINTMENT (OUTPATIENT)
Dept: HEMATOLOGY ONCOLOGY | Facility: CLINIC | Age: 73
End: 2018-01-31

## 2018-01-31 VITALS
BODY MASS INDEX: 26.48 KG/M2 | DIASTOLIC BLOOD PRESSURE: 65 MMHG | TEMPERATURE: 97.4 F | RESPIRATION RATE: 14 BRPM | SYSTOLIC BLOOD PRESSURE: 142 MMHG | HEART RATE: 64 BPM | HEIGHT: 70 IN | WEIGHT: 185 LBS

## 2018-01-31 VITALS
DIASTOLIC BLOOD PRESSURE: 78 MMHG | WEIGHT: 183 LBS | BODY MASS INDEX: 28.72 KG/M2 | HEIGHT: 67 IN | SYSTOLIC BLOOD PRESSURE: 146 MMHG

## 2018-01-31 LAB
BASOPHILS # BLD: 0.04 TH/MM3
BASOPHILS NFR BLD: 0.7 %
EOSINOPHIL # BLD: 0.28 TH/MM3
EOSINOPHIL NFR BLD: 5.2 %
ERYTHROCYTE [DISTWIDTH] IN BLOOD BY AUTOMATED COUNT: 17.8 %
GRANULOCYTES # BLD: 2.39 TH/MM3
GRANULOCYTES NFR BLD: 44.9 %
HCT VFR BLD AUTO: 26.1 %
HGB BLD-MCNC: 8.9 G/DL
IMM GRANULOCYTES # BLD: 0.01 TH/MM3
IMM GRANULOCYTES NFR BLD: 0.2 %
LYMPHOCYTES # BLD: 2.01 TH/MM3
LYMPHOCYTES NFR BLD: 37.6 %
MCH RBC QN AUTO: 31.9 PG
MCHC RBC AUTO-ENTMCNC: 34.1 G/DL
MCV RBC AUTO: 93.5 FL
MONOCYTES # BLD: 0.61 TH/MM3
MONOCYTES NFR BLD: 11.4 %
PLATELET # BLD: 171 TH/MM3
PMV BLD AUTO: 9.7 FL
RBC # BLD AUTO: 2.79 MIL/MM3
WBC # BLD: 5.34 TH/MM3

## 2018-01-31 PROCEDURE — 99024 POSTOP FOLLOW-UP VISIT: CPT

## 2018-01-31 RX ORDER — NIFEDIPINE 90 MG/1
90 TABLET, EXTENDED RELEASE ORAL
Refills: 0 | Status: ACTIVE | COMMUNITY

## 2018-02-01 LAB
ALBUMIN SERPL-MCNC: 3.7 G/DL
ALBUMIN/GLOB SERPL: 1.48
ALP SERPL-CCNC: 84 IU/L
ALT SERPL-CCNC: 13 IU/L
ANION GAP SERPL CALC-SCNC: 10 MEQ/L
AST SERPL-CCNC: 17 IU/L
BILIRUB SERPL-MCNC: 0.5 MG/DL
BUN SERPL-MCNC: 20 MG/DL
BUN/CREAT SERPL: 11.3 %
CALCIUM SERPL-MCNC: 9.5 MG/DL
CHLORIDE SERPL-SCNC: 104 MEQ/L
CO2 SERPL-SCNC: 26 MEQ/L
CREAT SERPL-MCNC: 1.77 MG/DL
GFR SERPL CREATININE-BSD FRML MDRD: 46
GLUCOSE SERPL-MCNC: 90 MG/DL
POTASSIUM SERPL-SCNC: 4.4 MMOL/L
PROT SERPL-MCNC: 6.2 G/DL
SODIUM SERPL-SCNC: 140 MEQ/L

## 2018-02-02 ENCOUNTER — APPOINTMENT (OUTPATIENT)
Dept: HEMATOLOGY ONCOLOGY | Facility: CLINIC | Age: 73
End: 2018-02-02

## 2018-02-02 DIAGNOSIS — F32.9 MAJOR DEPRESSIVE DISORDER, SINGLE EPISODE, UNSPECIFIED: ICD-10-CM

## 2018-02-02 DIAGNOSIS — N40.0 BENIGN PROSTATIC HYPERPLASIA WITHOUT LOWER URINARY TRACT SYMPTOMS: ICD-10-CM

## 2018-02-02 DIAGNOSIS — E78.00 PURE HYPERCHOLESTEROLEMIA, UNSPECIFIED: ICD-10-CM

## 2018-02-02 DIAGNOSIS — I10 ESSENTIAL (PRIMARY) HYPERTENSION: ICD-10-CM

## 2018-02-02 DIAGNOSIS — C80.1 MALIGNANT (PRIMARY) NEOPLASM, UNSPECIFIED: ICD-10-CM

## 2018-02-02 DIAGNOSIS — F41.9 ANXIETY DISORDER, UNSPECIFIED: ICD-10-CM

## 2018-02-02 DIAGNOSIS — D64.9 ANEMIA, UNSPECIFIED: ICD-10-CM

## 2018-02-03 LAB
ANION GAP SERPL CALC-SCNC: 8 MEQ/L
BUN SERPL-MCNC: 20 MG/DL
BUN/CREAT SERPL: 11.9 %
CALCIUM SERPL-MCNC: 9.4 MG/DL
CHLORIDE SERPL-SCNC: 106 MEQ/L
CO2 SERPL-SCNC: 26 MEQ/L
CREAT SERPL-MCNC: 1.68 MG/DL
GFR SERPL CREATININE-BSD FRML MDRD: 49
GLUCOSE SERPL-MCNC: 100 MG/DL
POTASSIUM SERPL-SCNC: 4.3 MMOL/L
SODIUM SERPL-SCNC: 140 MEQ/L

## 2018-02-04 DIAGNOSIS — K59.00 CONSTIPATION, UNSPECIFIED: ICD-10-CM

## 2018-02-05 ENCOUNTER — APPOINTMENT (OUTPATIENT)
Dept: INFUSION THERAPY | Facility: CLINIC | Age: 73
End: 2018-02-05

## 2018-02-05 ENCOUNTER — LABORATORY RESULT (OUTPATIENT)
Age: 73
End: 2018-02-05

## 2018-02-05 VITALS — HEIGHT: 69 IN

## 2018-02-05 LAB
HCT VFR BLD CALC: 27.8 %
HGB BLD-MCNC: 9.6 G/DL
MCHC RBC-ENTMCNC: 31.8 PG
MCHC RBC-ENTMCNC: 34.5 G/DL
MCV RBC AUTO: 92.1 FL
PLATELET # BLD AUTO: 140 K/UL
PMV BLD: 11 FL
RBC # BLD: 3.02 M/UL
RBC # FLD: 17.4 %
WBC # FLD AUTO: 5.77 K/UL

## 2018-02-05 RX ORDER — LEUCOVORIN CALCIUM 5 MG
800 TABLET ORAL ONCE
Qty: 0 | Refills: 0 | Status: COMPLETED | OUTPATIENT
Start: 2018-02-05 | End: 2018-02-05

## 2018-02-05 RX ORDER — FLUOROURACIL 50 MG/ML
4800 INJECTION, SOLUTION INTRAVENOUS ONCE
Qty: 0 | Refills: 0 | Status: COMPLETED | OUTPATIENT
Start: 2018-02-05 | End: 2018-02-05

## 2018-02-05 RX ORDER — ONDANSETRON 8 MG/1
16 TABLET, FILM COATED ORAL ONCE
Qty: 0 | Refills: 0 | Status: COMPLETED | OUTPATIENT
Start: 2018-02-05 | End: 2018-02-05

## 2018-02-05 RX ORDER — ATROPINE SULFATE 0.1 MG/ML
0.5 SYRINGE (ML) INJECTION ONCE
Qty: 0 | Refills: 0 | Status: COMPLETED | OUTPATIENT
Start: 2018-02-05 | End: 2018-02-05

## 2018-02-05 RX ORDER — DEXAMETHASONE 0.5 MG/5ML
12 ELIXIR ORAL ONCE
Qty: 0 | Refills: 0 | Status: COMPLETED | OUTPATIENT
Start: 2018-02-05 | End: 2018-02-05

## 2018-02-05 RX ORDER — FLUOROURACIL 50 MG/ML
800 INJECTION, SOLUTION INTRAVENOUS ONCE
Qty: 0 | Refills: 0 | Status: COMPLETED | OUTPATIENT
Start: 2018-02-05 | End: 2018-02-05

## 2018-02-05 RX ORDER — IRINOTECAN HYDROCHLORIDE 100 MG/5ML
360 INJECTION, SOLUTION INTRAVENOUS ONCE
Qty: 0 | Refills: 0 | Status: COMPLETED | OUTPATIENT
Start: 2018-02-05 | End: 2018-02-05

## 2018-02-05 RX ADMIN — IRINOTECAN HYDROCHLORIDE 259 MILLIGRAM(S): 100 INJECTION, SOLUTION INTRAVENOUS at 12:08

## 2018-02-05 RX ADMIN — Medication 145 MILLIGRAM(S): at 12:08

## 2018-02-05 RX ADMIN — Medication 159 MILLIGRAM(S): at 11:15

## 2018-02-05 RX ADMIN — FLUOROURACIL 4.78 MILLIGRAM(S): 50 INJECTION, SOLUTION INTRAVENOUS at 13:53

## 2018-02-05 RX ADMIN — FLUOROURACIL 192 MILLIGRAM(S): 50 INJECTION, SOLUTION INTRAVENOUS at 13:52

## 2018-02-05 RX ADMIN — ONDANSETRON 174 MILLIGRAM(S): 8 TABLET, FILM COATED ORAL at 11:15

## 2018-02-05 RX ADMIN — Medication 0.5 MILLIGRAM(S): at 12:06

## 2018-02-07 ENCOUNTER — APPOINTMENT (OUTPATIENT)
Dept: INFUSION THERAPY | Facility: CLINIC | Age: 73
End: 2018-02-07

## 2018-02-09 ENCOUNTER — APPOINTMENT (OUTPATIENT)
Dept: INFUSION THERAPY | Facility: CLINIC | Age: 73
End: 2018-02-09

## 2018-02-20 ENCOUNTER — LABORATORY RESULT (OUTPATIENT)
Age: 73
End: 2018-02-20

## 2018-02-20 ENCOUNTER — APPOINTMENT (OUTPATIENT)
Dept: HEMATOLOGY ONCOLOGY | Facility: CLINIC | Age: 73
End: 2018-02-20

## 2018-02-20 VITALS
DIASTOLIC BLOOD PRESSURE: 72 MMHG | WEIGHT: 171 LBS | BODY MASS INDEX: 25.33 KG/M2 | HEART RATE: 66 BPM | TEMPERATURE: 98.7 F | SYSTOLIC BLOOD PRESSURE: 137 MMHG | HEIGHT: 69 IN

## 2018-02-20 DIAGNOSIS — R63.0 ANOREXIA: ICD-10-CM

## 2018-02-20 LAB
HCT VFR BLD CALC: 23.5 %
HGB BLD-MCNC: 8.4 G/DL
MCHC RBC-ENTMCNC: 31.7 PG
MCHC RBC-ENTMCNC: 35.7 G/DL
MCV RBC AUTO: 88.7 FL
PLATELET # BLD AUTO: 148 K/UL
PMV BLD: 11.2 FL
RBC # BLD: 2.65 M/UL
RBC # FLD: 17.5 %
WBC # FLD AUTO: 4.36 K/UL

## 2018-02-21 ENCOUNTER — APPOINTMENT (OUTPATIENT)
Dept: INFUSION THERAPY | Facility: CLINIC | Age: 73
End: 2018-02-21

## 2018-02-21 RX ORDER — DEXAMETHASONE 0.5 MG/5ML
12 ELIXIR ORAL ONCE
Qty: 0 | Refills: 0 | Status: COMPLETED | OUTPATIENT
Start: 2018-02-21 | End: 2018-02-21

## 2018-02-21 RX ORDER — FLUOROURACIL 50 MG/ML
800 INJECTION, SOLUTION INTRAVENOUS ONCE
Qty: 0 | Refills: 0 | Status: COMPLETED | OUTPATIENT
Start: 2018-02-21 | End: 2018-02-21

## 2018-02-21 RX ORDER — ONDANSETRON 8 MG/1
16 TABLET, FILM COATED ORAL ONCE
Qty: 0 | Refills: 0 | Status: COMPLETED | OUTPATIENT
Start: 2018-02-21 | End: 2018-02-21

## 2018-02-21 RX ORDER — ATROPINE SULFATE 0.1 MG/ML
0.5 SYRINGE (ML) INJECTION ONCE
Qty: 0 | Refills: 0 | Status: COMPLETED | OUTPATIENT
Start: 2018-02-21 | End: 2018-02-21

## 2018-02-21 RX ORDER — LEUCOVORIN CALCIUM 5 MG
800 TABLET ORAL ONCE
Qty: 0 | Refills: 0 | Status: COMPLETED | OUTPATIENT
Start: 2018-02-21 | End: 2018-02-21

## 2018-02-21 RX ORDER — FOSAPREPITANT DIMEGLUMINE 150 MG/5ML
150 INJECTION, POWDER, LYOPHILIZED, FOR SOLUTION INTRAVENOUS ONCE
Qty: 0 | Refills: 0 | Status: COMPLETED | OUTPATIENT
Start: 2018-02-21 | End: 2018-02-21

## 2018-02-21 RX ORDER — IRINOTECAN HYDROCHLORIDE 100 MG/5ML
360 INJECTION, SOLUTION INTRAVENOUS ONCE
Qty: 0 | Refills: 0 | Status: COMPLETED | OUTPATIENT
Start: 2018-02-21 | End: 2018-02-21

## 2018-02-21 RX ORDER — FLUOROURACIL 50 MG/ML
4800 INJECTION, SOLUTION INTRAVENOUS ONCE
Qty: 0 | Refills: 0 | Status: COMPLETED | OUTPATIENT
Start: 2018-02-21 | End: 2018-02-21

## 2018-02-21 RX ORDER — PANTOPRAZOLE SODIUM 20 MG/1
40 TABLET, DELAYED RELEASE ORAL ONCE
Qty: 0 | Refills: 0 | Status: DISCONTINUED | OUTPATIENT
Start: 2018-02-21 | End: 2018-02-21

## 2018-02-21 RX ADMIN — Medication 159 MILLIGRAM(S): at 11:54

## 2018-02-21 RX ADMIN — FOSAPREPITANT DIMEGLUMINE 465 MILLIGRAM(S): 150 INJECTION, POWDER, LYOPHILIZED, FOR SOLUTION INTRAVENOUS at 11:54

## 2018-02-21 RX ADMIN — IRINOTECAN HYDROCHLORIDE 259 MILLIGRAM(S): 100 INJECTION, SOLUTION INTRAVENOUS at 13:39

## 2018-02-21 RX ADMIN — ONDANSETRON 174 MILLIGRAM(S): 8 TABLET, FILM COATED ORAL at 11:55

## 2018-02-21 RX ADMIN — Medication 0.5 MILLIGRAM(S): at 11:55

## 2018-02-21 RX ADMIN — Medication 0.5 MILLIGRAM(S): at 14:02

## 2018-02-21 RX ADMIN — Medication 145 MILLIGRAM(S): at 13:38

## 2018-02-21 RX ADMIN — FLUOROURACIL 192 MILLIGRAM(S): 50 INJECTION, SOLUTION INTRAVENOUS at 13:36

## 2018-02-21 RX ADMIN — FLUOROURACIL 4.78 MILLIGRAM(S): 50 INJECTION, SOLUTION INTRAVENOUS at 13:39

## 2018-02-23 ENCOUNTER — APPOINTMENT (OUTPATIENT)
Dept: INFUSION THERAPY | Facility: CLINIC | Age: 73
End: 2018-02-23

## 2018-02-23 ENCOUNTER — OUTPATIENT (OUTPATIENT)
Dept: OUTPATIENT SERVICES | Facility: HOSPITAL | Age: 73
LOS: 1 days | Discharge: HOME | End: 2018-02-23

## 2018-02-23 DIAGNOSIS — C7A.8 OTHER MALIGNANT NEUROENDOCRINE TUMORS: ICD-10-CM

## 2018-02-23 DIAGNOSIS — D64.9 ANEMIA, UNSPECIFIED: ICD-10-CM

## 2018-02-23 DIAGNOSIS — C23 MALIGNANT NEOPLASM OF GALLBLADDER: ICD-10-CM

## 2018-02-23 DIAGNOSIS — Z51.11 ENCOUNTER FOR ANTINEOPLASTIC CHEMOTHERAPY: ICD-10-CM

## 2018-02-26 ENCOUNTER — OTHER (OUTPATIENT)
Age: 73
End: 2018-02-26

## 2018-02-26 LAB
ALBUMIN SERPL ELPH-MCNC: 3.7 G/DL
ALP BLD-CCNC: 712 U/L
ALT SERPL-CCNC: 186 U/L
ANION GAP SERPL CALC-SCNC: 18 MMOL/L
AST SERPL-CCNC: 90 U/L
BILIRUB SERPL-MCNC: 2.9 MG/DL
BUN SERPL-MCNC: 23 MG/DL
CALCIUM SERPL-MCNC: 8.7 MG/DL
CANCER AG19-9 SERPL-ACNC: 647.4 U/ML
CHLORIDE SERPL-SCNC: 99 MMOL/L
CO2 SERPL-SCNC: 25 MMOL/L
CREAT SERPL-MCNC: 2.3 MG/DL
GLUCOSE SERPL-MCNC: 173 MG/DL
POTASSIUM SERPL-SCNC: 3.7 MMOL/L
PROT SERPL-MCNC: 6.6 G/DL
SODIUM SERPL-SCNC: 142 MMOL/L

## 2018-02-27 ENCOUNTER — APPOINTMENT (OUTPATIENT)
Dept: HEMATOLOGY ONCOLOGY | Facility: CLINIC | Age: 73
End: 2018-02-27

## 2018-02-27 ENCOUNTER — LABORATORY RESULT (OUTPATIENT)
Age: 73
End: 2018-02-27

## 2018-02-28 ENCOUNTER — APPOINTMENT (OUTPATIENT)
Dept: INFUSION THERAPY | Facility: CLINIC | Age: 73
End: 2018-02-28

## 2018-02-28 LAB
ANION GAP SERPL CALC-SCNC: 10 MMOL/L
BUN SERPL-MCNC: 26 MG/DL
CALCIUM SERPL-MCNC: 8.6 MG/DL
CHLORIDE SERPL-SCNC: 99 MMOL/L
CO2 SERPL-SCNC: 24 MMOL/L
CREAT SERPL-MCNC: 1.8 MG/DL
GLUCOSE SERPL-MCNC: 118 MG/DL
POTASSIUM SERPL-SCNC: 4.2 MMOL/L
SODIUM SERPL-SCNC: 133 MMOL/L

## 2018-03-05 ENCOUNTER — LABORATORY RESULT (OUTPATIENT)
Age: 73
End: 2018-03-05

## 2018-03-05 ENCOUNTER — APPOINTMENT (OUTPATIENT)
Dept: INFUSION THERAPY | Facility: CLINIC | Age: 73
End: 2018-03-05

## 2018-03-05 ENCOUNTER — APPOINTMENT (OUTPATIENT)
Dept: HEMATOLOGY ONCOLOGY | Facility: CLINIC | Age: 73
End: 2018-03-05

## 2018-03-05 VITALS
HEART RATE: 70 BPM | BODY MASS INDEX: 26.07 KG/M2 | HEIGHT: 69 IN | DIASTOLIC BLOOD PRESSURE: 64 MMHG | TEMPERATURE: 97.9 F | RESPIRATION RATE: 14 BRPM | WEIGHT: 176 LBS | SYSTOLIC BLOOD PRESSURE: 136 MMHG

## 2018-03-05 LAB
ABO + RH PNL BLD: NORMAL
BLD GP AB SCN SERPL QL: NORMAL
HCT VFR BLD CALC: 21 %
HGB BLD-MCNC: 7.3 G/DL
MCHC RBC-ENTMCNC: 31.5 PG
MCHC RBC-ENTMCNC: 34.8 G/DL
MCV RBC AUTO: 90.5 FL
PLATELET # BLD AUTO: 138 K/UL
PMV BLD: 11.5 FL
RBC # BLD: 2.32 M/UL
RBC # FLD: 18 %
WBC # FLD AUTO: 2.45 K/UL

## 2018-03-05 RX ORDER — METOPROLOL TARTRATE 50 MG/1
50 TABLET, FILM COATED ORAL
Refills: 0 | Status: ACTIVE | COMMUNITY

## 2018-03-05 RX ORDER — LABETALOL HYDROCHLORIDE 100 MG/1
100 TABLET, FILM COATED ORAL
Refills: 0 | Status: DISCONTINUED | COMMUNITY
End: 2018-03-05

## 2018-03-05 RX ORDER — FILGRASTIM 480MCG/1.6
480 VIAL (ML) INJECTION ONCE
Qty: 0 | Refills: 0 | Status: COMPLETED | OUTPATIENT
Start: 2018-03-05 | End: 2018-03-05

## 2018-03-05 RX ORDER — LACTULOSE 10 G/15ML
10 SOLUTION ORAL EVERY 8 HOURS
Qty: 300 | Refills: 0 | Status: DISCONTINUED | COMMUNITY
Start: 2017-10-31 | End: 2018-03-05

## 2018-03-05 RX ADMIN — Medication 480 MICROGRAM(S): at 12:44

## 2018-03-06 ENCOUNTER — APPOINTMENT (OUTPATIENT)
Dept: INFUSION THERAPY | Facility: CLINIC | Age: 73
End: 2018-03-06

## 2018-03-06 RX ORDER — FILGRASTIM 480MCG/1.6
480 VIAL (ML) INJECTION ONCE
Qty: 0 | Refills: 0 | Status: COMPLETED | OUTPATIENT
Start: 2018-03-06 | End: 2018-03-06

## 2018-03-06 RX ADMIN — Medication 480 MICROGRAM(S): at 13:18

## 2018-03-07 ENCOUNTER — APPOINTMENT (OUTPATIENT)
Dept: INFUSION THERAPY | Facility: CLINIC | Age: 73
End: 2018-03-07

## 2018-03-07 ENCOUNTER — APPOINTMENT (OUTPATIENT)
Dept: HEMATOLOGY ONCOLOGY | Facility: CLINIC | Age: 73
End: 2018-03-07

## 2018-03-07 ENCOUNTER — LABORATORY RESULT (OUTPATIENT)
Age: 73
End: 2018-03-07

## 2018-03-07 LAB
HCT VFR BLD CALC: 25.5 %
HGB BLD-MCNC: 8.8 G/DL
MCHC RBC-ENTMCNC: 32 PG
MCHC RBC-ENTMCNC: 34.5 G/DL
MCV RBC AUTO: 92.7 FL
PLATELET # BLD AUTO: 140 K/UL
PMV BLD: 12 FL
RBC # BLD: 2.75 M/UL
RBC # FLD: 19.1 %
WBC # FLD AUTO: 3.88 K/UL

## 2018-03-07 RX ORDER — FOSAPREPITANT DIMEGLUMINE 150 MG/5ML
150 INJECTION, POWDER, LYOPHILIZED, FOR SOLUTION INTRAVENOUS ONCE
Qty: 0 | Refills: 0 | Status: COMPLETED | OUTPATIENT
Start: 2018-03-07 | End: 2018-03-07

## 2018-03-07 RX ORDER — DEXAMETHASONE 0.5 MG/5ML
12 ELIXIR ORAL ONCE
Qty: 0 | Refills: 0 | Status: COMPLETED | OUTPATIENT
Start: 2018-03-07 | End: 2018-03-07

## 2018-03-07 RX ORDER — ONDANSETRON 8 MG/1
16 TABLET, FILM COATED ORAL ONCE
Qty: 0 | Refills: 0 | Status: DISCONTINUED | OUTPATIENT
Start: 2018-03-07 | End: 2018-03-07

## 2018-03-07 RX ORDER — FLUOROURACIL 50 MG/ML
800 INJECTION, SOLUTION INTRAVENOUS ONCE
Qty: 0 | Refills: 0 | Status: COMPLETED | OUTPATIENT
Start: 2018-03-07 | End: 2018-03-07

## 2018-03-07 RX ORDER — DEXAMETHASONE 0.5 MG/5ML
12 ELIXIR ORAL ONCE
Qty: 0 | Refills: 0 | Status: DISCONTINUED | OUTPATIENT
Start: 2018-03-07 | End: 2018-03-07

## 2018-03-07 RX ORDER — LEUCOVORIN CALCIUM 5 MG
800 TABLET ORAL ONCE
Qty: 0 | Refills: 0 | Status: COMPLETED | OUTPATIENT
Start: 2018-03-07 | End: 2018-03-07

## 2018-03-07 RX ORDER — FLUOROURACIL 50 MG/ML
4800 INJECTION, SOLUTION INTRAVENOUS ONCE
Qty: 0 | Refills: 0 | Status: COMPLETED | OUTPATIENT
Start: 2018-03-07 | End: 2018-03-07

## 2018-03-07 RX ORDER — ATROPINE SULFATE 0.1 MG/ML
0.5 SYRINGE (ML) INJECTION ONCE
Qty: 0 | Refills: 0 | Status: COMPLETED | OUTPATIENT
Start: 2018-03-07 | End: 2018-03-07

## 2018-03-07 RX ORDER — IRINOTECAN HYDROCHLORIDE 100 MG/5ML
360 INJECTION, SOLUTION INTRAVENOUS ONCE
Qty: 0 | Refills: 0 | Status: COMPLETED | OUTPATIENT
Start: 2018-03-07 | End: 2018-03-07

## 2018-03-07 RX ADMIN — Medication 0.5 MILLIGRAM(S): at 11:02

## 2018-03-07 RX ADMIN — IRINOTECAN HYDROCHLORIDE 259 MILLIGRAM(S): 100 INJECTION, SOLUTION INTRAVENOUS at 11:39

## 2018-03-07 RX ADMIN — FOSAPREPITANT DIMEGLUMINE 465 MILLIGRAM(S): 150 INJECTION, POWDER, LYOPHILIZED, FOR SOLUTION INTRAVENOUS at 11:01

## 2018-03-07 RX ADMIN — FLUOROURACIL 192 MILLIGRAM(S): 50 INJECTION, SOLUTION INTRAVENOUS at 11:38

## 2018-03-07 RX ADMIN — Medication 183 MILLIGRAM(S): at 11:01

## 2018-03-07 RX ADMIN — Medication 145 MILLIGRAM(S): at 11:39

## 2018-03-07 RX ADMIN — FLUOROURACIL 4.78 MILLIGRAM(S): 50 INJECTION, SOLUTION INTRAVENOUS at 11:38

## 2018-03-08 ENCOUNTER — APPOINTMENT (OUTPATIENT)
Dept: INFUSION THERAPY | Facility: CLINIC | Age: 73
End: 2018-03-08

## 2018-03-09 ENCOUNTER — APPOINTMENT (OUTPATIENT)
Dept: INFUSION THERAPY | Facility: CLINIC | Age: 73
End: 2018-03-09

## 2018-03-09 RX ORDER — PEGFILGRASTIM-CBQV 6 MG/.6ML
6 INJECTION, SOLUTION SUBCUTANEOUS ONCE
Qty: 0 | Refills: 0 | Status: COMPLETED | OUTPATIENT
Start: 2018-03-09 | End: 2018-03-09

## 2018-03-09 RX ADMIN — PEGFILGRASTIM-CBQV 6 MILLIGRAM(S): 6 INJECTION, SOLUTION SUBCUTANEOUS at 11:54

## 2018-03-12 ENCOUNTER — APPOINTMENT (OUTPATIENT)
Dept: HEMATOLOGY ONCOLOGY | Facility: CLINIC | Age: 73
End: 2018-03-12

## 2018-03-19 ENCOUNTER — APPOINTMENT (OUTPATIENT)
Dept: HEMATOLOGY ONCOLOGY | Facility: CLINIC | Age: 73
End: 2018-03-19

## 2018-03-19 ENCOUNTER — LABORATORY RESULT (OUTPATIENT)
Age: 73
End: 2018-03-19

## 2018-03-19 VITALS
TEMPERATURE: 98.1 F | WEIGHT: 178 LBS | RESPIRATION RATE: 14 BRPM | SYSTOLIC BLOOD PRESSURE: 192 MMHG | HEIGHT: 69 IN | HEART RATE: 73 BPM | DIASTOLIC BLOOD PRESSURE: 79 MMHG | BODY MASS INDEX: 26.36 KG/M2

## 2018-03-19 LAB
HCT VFR BLD CALC: 25 %
HGB BLD-MCNC: 8.4 G/DL
MCHC RBC-ENTMCNC: 31.7 PG
MCHC RBC-ENTMCNC: 33.6 G/DL
MCV RBC AUTO: 94.3 FL
PLATELET # BLD AUTO: 167 K/UL
PMV BLD: 11.8 FL
RBC # BLD: 2.65 M/UL
RBC # FLD: 19.1 %
WBC # FLD AUTO: 18.04 K/UL

## 2018-03-20 ENCOUNTER — APPOINTMENT (OUTPATIENT)
Dept: INFUSION THERAPY | Facility: CLINIC | Age: 73
End: 2018-03-20

## 2018-03-20 RX ORDER — DEXAMETHASONE 0.5 MG/5ML
12 ELIXIR ORAL ONCE
Qty: 0 | Refills: 0 | Status: COMPLETED | OUTPATIENT
Start: 2018-03-20 | End: 2018-03-20

## 2018-03-20 RX ORDER — FLUOROURACIL 50 MG/ML
800 INJECTION, SOLUTION INTRAVENOUS ONCE
Qty: 0 | Refills: 0 | Status: COMPLETED | OUTPATIENT
Start: 2018-03-20 | End: 2018-03-20

## 2018-03-20 RX ORDER — FLUOROURACIL 50 MG/ML
4800 INJECTION, SOLUTION INTRAVENOUS ONCE
Qty: 0 | Refills: 0 | Status: COMPLETED | OUTPATIENT
Start: 2018-03-20 | End: 2018-03-20

## 2018-03-20 RX ORDER — FOSAPREPITANT DIMEGLUMINE 150 MG/5ML
150 INJECTION, POWDER, LYOPHILIZED, FOR SOLUTION INTRAVENOUS ONCE
Qty: 0 | Refills: 0 | Status: COMPLETED | OUTPATIENT
Start: 2018-03-20 | End: 2018-03-20

## 2018-03-20 RX ORDER — LEUCOVORIN CALCIUM 5 MG
800 TABLET ORAL ONCE
Qty: 0 | Refills: 0 | Status: COMPLETED | OUTPATIENT
Start: 2018-03-20 | End: 2018-03-20

## 2018-03-20 RX ORDER — ATROPINE SULFATE 0.1 MG/ML
0.5 SYRINGE (ML) INJECTION ONCE
Qty: 0 | Refills: 0 | Status: COMPLETED | OUTPATIENT
Start: 2018-03-20 | End: 2018-03-20

## 2018-03-20 RX ORDER — IRINOTECAN HYDROCHLORIDE 100 MG/5ML
360 INJECTION, SOLUTION INTRAVENOUS ONCE
Qty: 0 | Refills: 0 | Status: COMPLETED | OUTPATIENT
Start: 2018-03-20 | End: 2018-03-20

## 2018-03-20 RX ADMIN — Medication 183 MILLIGRAM(S): at 10:38

## 2018-03-20 RX ADMIN — FLUOROURACIL 4.78 MILLIGRAM(S): 50 INJECTION, SOLUTION INTRAVENOUS at 10:59

## 2018-03-20 RX ADMIN — FOSAPREPITANT DIMEGLUMINE 450 MILLIGRAM(S): 150 INJECTION, POWDER, LYOPHILIZED, FOR SOLUTION INTRAVENOUS at 10:38

## 2018-03-20 RX ADMIN — FLUOROURACIL 192 MILLIGRAM(S): 50 INJECTION, SOLUTION INTRAVENOUS at 10:59

## 2018-03-20 RX ADMIN — Medication 0.5 MILLIGRAM(S): at 10:39

## 2018-03-20 RX ADMIN — IRINOTECAN HYDROCHLORIDE 259 MILLIGRAM(S): 100 INJECTION, SOLUTION INTRAVENOUS at 10:58

## 2018-03-20 RX ADMIN — Medication 145 MILLIGRAM(S): at 10:58

## 2018-03-22 ENCOUNTER — APPOINTMENT (OUTPATIENT)
Dept: INFUSION THERAPY | Facility: CLINIC | Age: 73
End: 2018-03-22

## 2018-03-22 VITALS
SYSTOLIC BLOOD PRESSURE: 128 MMHG | HEART RATE: 74 BPM | DIASTOLIC BLOOD PRESSURE: 73 MMHG | RESPIRATION RATE: 16 BRPM | TEMPERATURE: 96.5 F

## 2018-03-22 RX ORDER — PEGFILGRASTIM-CBQV 6 MG/.6ML
6 INJECTION, SOLUTION SUBCUTANEOUS ONCE
Qty: 0 | Refills: 0 | Status: COMPLETED | OUTPATIENT
Start: 2018-03-22 | End: 2018-03-22

## 2018-03-22 RX ADMIN — PEGFILGRASTIM-CBQV 6 MILLIGRAM(S): 6 INJECTION, SOLUTION SUBCUTANEOUS at 11:19

## 2018-04-03 ENCOUNTER — APPOINTMENT (OUTPATIENT)
Dept: INFUSION THERAPY | Facility: CLINIC | Age: 73
End: 2018-04-03

## 2018-04-03 ENCOUNTER — LABORATORY RESULT (OUTPATIENT)
Age: 73
End: 2018-04-03

## 2018-04-03 RX ORDER — FLUOROURACIL 50 MG/ML
800 INJECTION, SOLUTION INTRAVENOUS ONCE
Qty: 0 | Refills: 0 | Status: COMPLETED | OUTPATIENT
Start: 2018-04-03 | End: 2018-04-03

## 2018-04-03 RX ORDER — IRINOTECAN HYDROCHLORIDE 100 MG/5ML
360 INJECTION, SOLUTION INTRAVENOUS ONCE
Qty: 0 | Refills: 0 | Status: DISCONTINUED | OUTPATIENT
Start: 2018-04-03 | End: 2018-04-03

## 2018-04-03 RX ORDER — ATROPINE SULFATE 0.1 MG/ML
0.5 SYRINGE (ML) INJECTION ONCE
Qty: 0 | Refills: 0 | Status: COMPLETED | OUTPATIENT
Start: 2018-04-03 | End: 2018-04-03

## 2018-04-03 RX ORDER — LEUCOVORIN CALCIUM 5 MG
800 TABLET ORAL ONCE
Qty: 0 | Refills: 0 | Status: COMPLETED | OUTPATIENT
Start: 2018-04-03 | End: 2018-04-03

## 2018-04-03 RX ORDER — IRINOTECAN HYDROCHLORIDE 100 MG/5ML
360 INJECTION, SOLUTION INTRAVENOUS ONCE
Qty: 0 | Refills: 0 | Status: COMPLETED | OUTPATIENT
Start: 2018-04-03 | End: 2018-04-03

## 2018-04-03 RX ORDER — FLUOROURACIL 50 MG/ML
4800 INJECTION, SOLUTION INTRAVENOUS ONCE
Qty: 0 | Refills: 0 | Status: COMPLETED | OUTPATIENT
Start: 2018-04-03 | End: 2018-04-03

## 2018-04-03 RX ORDER — DEXAMETHASONE 0.5 MG/5ML
12 ELIXIR ORAL ONCE
Qty: 0 | Refills: 0 | Status: COMPLETED | OUTPATIENT
Start: 2018-04-03 | End: 2018-04-03

## 2018-04-03 RX ORDER — FOSAPREPITANT DIMEGLUMINE 150 MG/5ML
150 INJECTION, POWDER, LYOPHILIZED, FOR SOLUTION INTRAVENOUS ONCE
Qty: 0 | Refills: 0 | Status: COMPLETED | OUTPATIENT
Start: 2018-04-03 | End: 2018-04-03

## 2018-04-03 RX ADMIN — FLUOROURACIL 4.78 MILLIGRAM(S): 50 INJECTION, SOLUTION INTRAVENOUS at 11:25

## 2018-04-03 RX ADMIN — FOSAPREPITANT DIMEGLUMINE 450 MILLIGRAM(S): 150 INJECTION, POWDER, LYOPHILIZED, FOR SOLUTION INTRAVENOUS at 10:57

## 2018-04-03 RX ADMIN — Medication 183 MILLIGRAM(S): at 10:58

## 2018-04-03 RX ADMIN — Medication 0.5 MILLIGRAM(S): at 10:58

## 2018-04-03 RX ADMIN — Medication 145 MILLIGRAM(S): at 11:24

## 2018-04-03 RX ADMIN — FLUOROURACIL 192 MILLIGRAM(S): 50 INJECTION, SOLUTION INTRAVENOUS at 11:25

## 2018-04-03 RX ADMIN — IRINOTECAN HYDROCHLORIDE 259 MILLIGRAM(S): 100 INJECTION, SOLUTION INTRAVENOUS at 11:24

## 2018-04-04 LAB
HCT VFR BLD CALC: 28.2 %
HGB BLD-MCNC: 9.5 G/DL
MCHC RBC-ENTMCNC: 31.6 PG
MCHC RBC-ENTMCNC: 33.7 G/DL
MCV RBC AUTO: 93.7 FL
PLATELET # BLD AUTO: 180 K/UL
PMV BLD: 11.7 FL
RBC # BLD: 3.01 M/UL
RBC # FLD: 19.9 %
WBC # FLD AUTO: 13.03 K/UL

## 2018-04-05 ENCOUNTER — APPOINTMENT (OUTPATIENT)
Dept: INFUSION THERAPY | Facility: CLINIC | Age: 73
End: 2018-04-05

## 2018-04-05 RX ORDER — PEGFILGRASTIM-CBQV 6 MG/.6ML
6 INJECTION, SOLUTION SUBCUTANEOUS ONCE
Qty: 0 | Refills: 0 | Status: COMPLETED | OUTPATIENT
Start: 2018-04-05 | End: 2018-04-05

## 2018-04-05 RX ADMIN — PEGFILGRASTIM-CBQV 6 MILLIGRAM(S): 6 INJECTION, SOLUTION SUBCUTANEOUS at 12:49

## 2018-04-10 ENCOUNTER — OUTPATIENT (OUTPATIENT)
Dept: OUTPATIENT SERVICES | Facility: HOSPITAL | Age: 73
LOS: 1 days | Discharge: HOME | End: 2018-04-10

## 2018-04-10 DIAGNOSIS — C79.9 SECONDARY MALIGNANT NEOPLASM OF UNSPECIFIED SITE: ICD-10-CM

## 2018-04-10 DIAGNOSIS — C67.4 MALIGNANT NEOPLASM OF POSTERIOR WALL OF BLADDER: ICD-10-CM

## 2018-04-16 ENCOUNTER — APPOINTMENT (OUTPATIENT)
Dept: HEMATOLOGY ONCOLOGY | Facility: CLINIC | Age: 73
End: 2018-04-16

## 2018-04-17 ENCOUNTER — APPOINTMENT (OUTPATIENT)
Dept: INFUSION THERAPY | Facility: CLINIC | Age: 73
End: 2018-04-17

## 2018-04-17 ENCOUNTER — APPOINTMENT (OUTPATIENT)
Dept: HEMATOLOGY ONCOLOGY | Facility: CLINIC | Age: 73
End: 2018-04-17

## 2018-04-17 ENCOUNTER — LABORATORY RESULT (OUTPATIENT)
Age: 73
End: 2018-04-17

## 2018-04-17 VITALS
HEIGHT: 69 IN | HEART RATE: 64 BPM | SYSTOLIC BLOOD PRESSURE: 148 MMHG | BODY MASS INDEX: 24.88 KG/M2 | WEIGHT: 168 LBS | DIASTOLIC BLOOD PRESSURE: 67 MMHG | TEMPERATURE: 98.4 F

## 2018-04-17 LAB
HCT VFR BLD CALC: 26.6 %
HGB BLD-MCNC: 9.2 G/DL
MCHC RBC-ENTMCNC: 32.3 PG
MCHC RBC-ENTMCNC: 34.6 G/DL
MCV RBC AUTO: 93.3 FL
PLATELET # BLD AUTO: 149 K/UL
PMV BLD: 11.2 FL
RBC # BLD: 2.85 M/UL
RBC # FLD: 19.3 %
WBC # FLD AUTO: 15.34 K/UL

## 2018-04-17 RX ORDER — DEXAMETHASONE 0.5 MG/5ML
12 ELIXIR ORAL ONCE
Qty: 0 | Refills: 0 | Status: COMPLETED | OUTPATIENT
Start: 2018-04-17 | End: 2018-04-17

## 2018-04-17 RX ORDER — FLUOROURACIL 50 MG/ML
4800 INJECTION, SOLUTION INTRAVENOUS ONCE
Qty: 0 | Refills: 0 | Status: COMPLETED | OUTPATIENT
Start: 2018-04-17 | End: 2018-04-17

## 2018-04-17 RX ORDER — IRINOTECAN HYDROCHLORIDE 100 MG/5ML
360 INJECTION, SOLUTION INTRAVENOUS ONCE
Qty: 0 | Refills: 0 | Status: COMPLETED | OUTPATIENT
Start: 2018-04-17 | End: 2018-04-17

## 2018-04-17 RX ORDER — FOSAPREPITANT DIMEGLUMINE 150 MG/5ML
150 INJECTION, POWDER, LYOPHILIZED, FOR SOLUTION INTRAVENOUS ONCE
Qty: 0 | Refills: 0 | Status: COMPLETED | OUTPATIENT
Start: 2018-04-17 | End: 2018-04-17

## 2018-04-17 RX ORDER — LEUCOVORIN CALCIUM 5 MG
800 TABLET ORAL ONCE
Qty: 0 | Refills: 0 | Status: COMPLETED | OUTPATIENT
Start: 2018-04-17 | End: 2018-04-17

## 2018-04-17 RX ORDER — ATROPINE SULFATE 0.1 MG/ML
0.5 SYRINGE (ML) INJECTION ONCE
Qty: 0 | Refills: 0 | Status: COMPLETED | OUTPATIENT
Start: 2018-04-17 | End: 2018-04-17

## 2018-04-17 RX ORDER — FLUOROURACIL 50 MG/ML
800 INJECTION, SOLUTION INTRAVENOUS ONCE
Qty: 0 | Refills: 0 | Status: COMPLETED | OUTPATIENT
Start: 2018-04-17 | End: 2018-04-17

## 2018-04-17 RX ORDER — ONDANSETRON 8 MG/1
8 TABLET ORAL 3 TIMES DAILY
Qty: 90 | Refills: 3 | Status: ACTIVE | COMMUNITY
Start: 2017-08-14 | End: 1900-01-01

## 2018-04-17 RX ORDER — PROCHLORPERAZINE MALEATE 10 MG/1
10 TABLET ORAL EVERY 6 HOURS
Qty: 30 | Refills: 3 | Status: ACTIVE | COMMUNITY
Start: 2017-08-21 | End: 1900-01-01

## 2018-04-17 RX ADMIN — Medication 0.5 MILLIGRAM(S): at 10:39

## 2018-04-17 RX ADMIN — Medication 183 MILLIGRAM(S): at 10:38

## 2018-04-17 RX ADMIN — FOSAPREPITANT DIMEGLUMINE 450 MILLIGRAM(S): 150 INJECTION, POWDER, LYOPHILIZED, FOR SOLUTION INTRAVENOUS at 10:38

## 2018-04-17 RX ADMIN — FLUOROURACIL 192 MILLIGRAM(S): 50 INJECTION, SOLUTION INTRAVENOUS at 11:02

## 2018-04-17 RX ADMIN — FLUOROURACIL 4.78 MILLIGRAM(S): 50 INJECTION, SOLUTION INTRAVENOUS at 11:03

## 2018-04-17 RX ADMIN — Medication 145 MILLIGRAM(S): at 11:01

## 2018-04-17 RX ADMIN — IRINOTECAN HYDROCHLORIDE 259 MILLIGRAM(S): 100 INJECTION, SOLUTION INTRAVENOUS at 11:02

## 2018-04-18 LAB
ALBUMIN SERPL ELPH-MCNC: 4.1 G/DL
ALP BLD-CCNC: 357 U/L
ALT SERPL-CCNC: 47 U/L
ANION GAP SERPL CALC-SCNC: 17 MMOL/L
AST SERPL-CCNC: 28 U/L
BILIRUB SERPL-MCNC: 0.5 MG/DL
BUN SERPL-MCNC: 12 MG/DL
CALCIUM SERPL-MCNC: 8.9 MG/DL
CHLORIDE SERPL-SCNC: 101 MMOL/L
CO2 SERPL-SCNC: 26 MMOL/L
CREAT SERPL-MCNC: 1.5 MG/DL
GLUCOSE SERPL-MCNC: 84 MG/DL
POTASSIUM SERPL-SCNC: 3.6 MMOL/L
PROT SERPL-MCNC: 6.4 G/DL
SODIUM SERPL-SCNC: 144 MMOL/L

## 2018-04-19 ENCOUNTER — RX RENEWAL (OUTPATIENT)
Age: 73
End: 2018-04-19

## 2018-04-19 ENCOUNTER — APPOINTMENT (OUTPATIENT)
Dept: INFUSION THERAPY | Facility: CLINIC | Age: 73
End: 2018-04-19

## 2018-04-19 RX ORDER — PEGFILGRASTIM-CBQV 6 MG/.6ML
6 INJECTION, SOLUTION SUBCUTANEOUS ONCE
Qty: 0 | Refills: 0 | Status: COMPLETED | OUTPATIENT
Start: 2018-04-19 | End: 2018-04-19

## 2018-04-19 RX ADMIN — PEGFILGRASTIM-CBQV 6 MILLIGRAM(S): 6 INJECTION, SOLUTION SUBCUTANEOUS at 11:22

## 2018-05-01 ENCOUNTER — APPOINTMENT (OUTPATIENT)
Dept: INFUSION THERAPY | Facility: CLINIC | Age: 73
End: 2018-05-01

## 2018-05-01 ENCOUNTER — LABORATORY RESULT (OUTPATIENT)
Age: 73
End: 2018-05-01

## 2018-05-01 ENCOUNTER — APPOINTMENT (OUTPATIENT)
Dept: HEMATOLOGY ONCOLOGY | Facility: CLINIC | Age: 73
End: 2018-05-01

## 2018-05-01 VITALS
DIASTOLIC BLOOD PRESSURE: 65 MMHG | WEIGHT: 170 LBS | HEART RATE: 65 BPM | BODY MASS INDEX: 25.18 KG/M2 | HEIGHT: 69 IN | TEMPERATURE: 96.2 F | SYSTOLIC BLOOD PRESSURE: 117 MMHG

## 2018-05-01 LAB
ALBUMIN SERPL ELPH-MCNC: 3.9 G/DL
ALP BLD-CCNC: 213 U/L
ALT SERPL-CCNC: 21 U/L
ANION GAP SERPL CALC-SCNC: 13 MMOL/L
AST SERPL-CCNC: 17 U/L
BILIRUB SERPL-MCNC: 0.3 MG/DL
BUN SERPL-MCNC: 20 MG/DL
CALCIUM SERPL-MCNC: 8.6 MG/DL
CHLORIDE SERPL-SCNC: 106 MMOL/L
CO2 SERPL-SCNC: 23 MMOL/L
CREAT SERPL-MCNC: 1.6 MG/DL
GLUCOSE SERPL-MCNC: 97 MG/DL
HCT VFR BLD CALC: 27.5 %
HGB BLD-MCNC: 9.4 G/DL
MAGNESIUM SERPL-MCNC: 1.3 MG/DL
MCHC RBC-ENTMCNC: 32.2 PG
MCHC RBC-ENTMCNC: 34.2 G/DL
MCV RBC AUTO: 94.2 FL
PLATELET # BLD AUTO: 155 K/UL
PMV BLD: 10.8 FL
POTASSIUM SERPL-SCNC: 3.7 MMOL/L
PROT SERPL-MCNC: 6.2 G/DL
RBC # BLD: 2.92 M/UL
RBC # FLD: 19.7 %
SODIUM SERPL-SCNC: 142 MMOL/L
WBC # FLD AUTO: 12.57 K/UL

## 2018-05-01 RX ORDER — ATROPINE SULFATE 0.1 MG/ML
0.5 SYRINGE (ML) INJECTION ONCE
Qty: 0 | Refills: 0 | Status: COMPLETED | OUTPATIENT
Start: 2018-05-01 | End: 2018-05-01

## 2018-05-01 RX ORDER — LEUCOVORIN CALCIUM 5 MG
800 TABLET ORAL ONCE
Qty: 0 | Refills: 0 | Status: COMPLETED | OUTPATIENT
Start: 2018-05-01 | End: 2018-05-01

## 2018-05-01 RX ORDER — FLUOROURACIL 50 MG/ML
4800 INJECTION, SOLUTION INTRAVENOUS ONCE
Qty: 0 | Refills: 0 | Status: COMPLETED | OUTPATIENT
Start: 2018-05-01 | End: 2018-05-01

## 2018-05-01 RX ORDER — FLUOROURACIL 50 MG/ML
800 INJECTION, SOLUTION INTRAVENOUS ONCE
Qty: 0 | Refills: 0 | Status: COMPLETED | OUTPATIENT
Start: 2018-05-01 | End: 2018-05-01

## 2018-05-01 RX ORDER — IRINOTECAN HYDROCHLORIDE 100 MG/5ML
360 INJECTION, SOLUTION INTRAVENOUS ONCE
Qty: 0 | Refills: 0 | Status: COMPLETED | OUTPATIENT
Start: 2018-05-01 | End: 2018-05-01

## 2018-05-01 RX ORDER — FOSAPREPITANT DIMEGLUMINE 150 MG/5ML
150 INJECTION, POWDER, LYOPHILIZED, FOR SOLUTION INTRAVENOUS ONCE
Qty: 0 | Refills: 0 | Status: COMPLETED | OUTPATIENT
Start: 2018-05-01 | End: 2018-05-01

## 2018-05-01 RX ORDER — DEXAMETHASONE 0.5 MG/5ML
12 ELIXIR ORAL ONCE
Qty: 0 | Refills: 0 | Status: COMPLETED | OUTPATIENT
Start: 2018-05-01 | End: 2018-05-01

## 2018-05-01 RX ADMIN — FOSAPREPITANT DIMEGLUMINE 450 MILLIGRAM(S): 150 INJECTION, POWDER, LYOPHILIZED, FOR SOLUTION INTRAVENOUS at 10:33

## 2018-05-01 RX ADMIN — Medication 0.5 MILLIGRAM(S): at 10:33

## 2018-05-01 RX ADMIN — FLUOROURACIL 192 MILLIGRAM(S): 50 INJECTION, SOLUTION INTRAVENOUS at 10:59

## 2018-05-01 RX ADMIN — Medication 183 MILLIGRAM(S): at 10:33

## 2018-05-01 RX ADMIN — Medication 145 MILLIGRAM(S): at 10:58

## 2018-05-01 RX ADMIN — IRINOTECAN HYDROCHLORIDE 259 MILLIGRAM(S): 100 INJECTION, SOLUTION INTRAVENOUS at 10:58

## 2018-05-01 RX ADMIN — FLUOROURACIL 4.78 MILLIGRAM(S): 50 INJECTION, SOLUTION INTRAVENOUS at 10:59

## 2018-05-03 ENCOUNTER — APPOINTMENT (OUTPATIENT)
Dept: INFUSION THERAPY | Facility: CLINIC | Age: 73
End: 2018-05-03

## 2018-05-03 VITALS
TEMPERATURE: 98.3 F | DIASTOLIC BLOOD PRESSURE: 55 MMHG | HEART RATE: 63 BPM | SYSTOLIC BLOOD PRESSURE: 106 MMHG | RESPIRATION RATE: 18 BRPM

## 2018-05-03 RX ORDER — PEGFILGRASTIM-CBQV 6 MG/.6ML
6 INJECTION, SOLUTION SUBCUTANEOUS ONCE
Qty: 0 | Refills: 0 | Status: COMPLETED | OUTPATIENT
Start: 2018-05-03 | End: 2018-05-03

## 2018-05-03 RX ADMIN — PEGFILGRASTIM-CBQV 6 MILLIGRAM(S): 6 INJECTION, SOLUTION SUBCUTANEOUS at 14:29

## 2018-05-15 ENCOUNTER — APPOINTMENT (OUTPATIENT)
Dept: HEMATOLOGY ONCOLOGY | Facility: CLINIC | Age: 73
End: 2018-05-15

## 2018-05-15 ENCOUNTER — APPOINTMENT (OUTPATIENT)
Dept: INFUSION THERAPY | Facility: CLINIC | Age: 73
End: 2018-05-15

## 2018-05-15 ENCOUNTER — LABORATORY RESULT (OUTPATIENT)
Age: 73
End: 2018-05-15

## 2018-05-15 VITALS
DIASTOLIC BLOOD PRESSURE: 66 MMHG | HEIGHT: 69 IN | RESPIRATION RATE: 18 BRPM | SYSTOLIC BLOOD PRESSURE: 127 MMHG | HEART RATE: 62 BPM | TEMPERATURE: 97.7 F | WEIGHT: 174 LBS | BODY MASS INDEX: 25.77 KG/M2

## 2018-05-15 LAB
HCT VFR BLD CALC: 24.9 %
HGB BLD-MCNC: 8.5 G/DL
MCHC RBC-ENTMCNC: 32.3 PG
MCHC RBC-ENTMCNC: 34.1 G/DL
MCV RBC AUTO: 94.7 FL
PLATELET # BLD AUTO: 133 K/UL
PMV BLD: 11.2 FL
RBC # BLD: 2.63 M/UL
RBC # FLD: 19 %
WBC # FLD AUTO: 12.97 K/UL

## 2018-05-15 RX ORDER — DEXAMETHASONE 0.5 MG/5ML
12 ELIXIR ORAL ONCE
Qty: 0 | Refills: 0 | Status: COMPLETED | OUTPATIENT
Start: 2018-05-15 | End: 2018-05-15

## 2018-05-15 RX ORDER — FLUOROURACIL 50 MG/ML
4800 INJECTION, SOLUTION INTRAVENOUS ONCE
Qty: 0 | Refills: 0 | Status: COMPLETED | OUTPATIENT
Start: 2018-05-15 | End: 2018-05-15

## 2018-05-15 RX ORDER — FOSAPREPITANT DIMEGLUMINE 150 MG/5ML
150 INJECTION, POWDER, LYOPHILIZED, FOR SOLUTION INTRAVENOUS ONCE
Qty: 0 | Refills: 0 | Status: COMPLETED | OUTPATIENT
Start: 2018-05-15 | End: 2018-05-15

## 2018-05-15 RX ORDER — LEUCOVORIN CALCIUM 5 MG
800 TABLET ORAL ONCE
Qty: 0 | Refills: 0 | Status: COMPLETED | OUTPATIENT
Start: 2018-05-15 | End: 2018-05-15

## 2018-05-15 RX ORDER — FLUOROURACIL 50 MG/ML
800 INJECTION, SOLUTION INTRAVENOUS ONCE
Qty: 0 | Refills: 0 | Status: COMPLETED | OUTPATIENT
Start: 2018-05-15 | End: 2018-05-15

## 2018-05-15 RX ORDER — IRINOTECAN HYDROCHLORIDE 100 MG/5ML
360 INJECTION, SOLUTION INTRAVENOUS ONCE
Qty: 0 | Refills: 0 | Status: COMPLETED | OUTPATIENT
Start: 2018-05-15 | End: 2018-05-15

## 2018-05-15 RX ORDER — ATROPINE SULFATE 0.1 MG/ML
0.5 SYRINGE (ML) INJECTION ONCE
Qty: 0 | Refills: 0 | Status: COMPLETED | OUTPATIENT
Start: 2018-05-15 | End: 2018-05-15

## 2018-05-15 RX ADMIN — IRINOTECAN HYDROCHLORIDE 259 MILLIGRAM(S): 100 INJECTION, SOLUTION INTRAVENOUS at 12:02

## 2018-05-15 RX ADMIN — FLUOROURACIL 192 MILLIGRAM(S): 50 INJECTION, SOLUTION INTRAVENOUS at 12:02

## 2018-05-15 RX ADMIN — FOSAPREPITANT DIMEGLUMINE 450 MILLIGRAM(S): 150 INJECTION, POWDER, LYOPHILIZED, FOR SOLUTION INTRAVENOUS at 11:26

## 2018-05-15 RX ADMIN — Medication 145 MILLIGRAM(S): at 12:01

## 2018-05-15 RX ADMIN — FLUOROURACIL 4.78 MILLIGRAM(S): 50 INJECTION, SOLUTION INTRAVENOUS at 12:01

## 2018-05-15 RX ADMIN — Medication 0.5 MILLIGRAM(S): at 12:00

## 2018-05-15 RX ADMIN — Medication 183 MILLIGRAM(S): at 11:26

## 2018-05-16 LAB
ANION GAP SERPL CALC-SCNC: 13 MMOL/L
BUN SERPL-MCNC: 11 MG/DL
CALCIUM SERPL-MCNC: 9.3 MG/DL
CHLORIDE SERPL-SCNC: 106 MMOL/L
CO2 SERPL-SCNC: 26 MMOL/L
CREAT SERPL-MCNC: 1.6 MG/DL
GLUCOSE SERPL-MCNC: 107 MG/DL
MAGNESIUM SERPL-MCNC: 1.8 MG/DL
POTASSIUM SERPL-SCNC: 4.1 MMOL/L
SODIUM SERPL-SCNC: 145 MMOL/L

## 2018-05-17 ENCOUNTER — APPOINTMENT (OUTPATIENT)
Dept: INFUSION THERAPY | Facility: CLINIC | Age: 73
End: 2018-05-17

## 2018-05-17 RX ORDER — PEGFILGRASTIM-CBQV 6 MG/.6ML
6 INJECTION, SOLUTION SUBCUTANEOUS ONCE
Qty: 0 | Refills: 0 | Status: COMPLETED | OUTPATIENT
Start: 2018-05-17 | End: 2018-05-17

## 2018-05-17 RX ADMIN — PEGFILGRASTIM-CBQV 6 MILLIGRAM(S): 6 INJECTION, SOLUTION SUBCUTANEOUS at 10:29

## 2018-05-23 ENCOUNTER — APPOINTMENT (OUTPATIENT)
Dept: HEMATOLOGY ONCOLOGY | Facility: CLINIC | Age: 73
End: 2018-05-23

## 2018-05-23 ENCOUNTER — LABORATORY RESULT (OUTPATIENT)
Age: 73
End: 2018-05-23

## 2018-05-23 LAB
HCT VFR BLD CALC: 23.3 %
HGB BLD-MCNC: 8.2 G/DL
MCHC RBC-ENTMCNC: 32.8 PG
MCHC RBC-ENTMCNC: 35.2 G/DL
MCV RBC AUTO: 93.2 FL
PLATELET # BLD AUTO: 95 K/UL
PMV BLD: 11.7 FL
RBC # BLD: 2.5 M/UL
RBC # FLD: 17.9 %
WBC # FLD AUTO: 12.93 K/UL

## 2018-05-24 ENCOUNTER — APPOINTMENT (OUTPATIENT)
Dept: INFUSION THERAPY | Facility: CLINIC | Age: 73
End: 2018-05-24

## 2018-05-29 ENCOUNTER — LABORATORY RESULT (OUTPATIENT)
Age: 73
End: 2018-05-29

## 2018-05-29 ENCOUNTER — APPOINTMENT (OUTPATIENT)
Dept: HEMATOLOGY ONCOLOGY | Facility: CLINIC | Age: 73
End: 2018-05-29

## 2018-05-29 ENCOUNTER — APPOINTMENT (OUTPATIENT)
Dept: INFUSION THERAPY | Facility: CLINIC | Age: 73
End: 2018-05-29

## 2018-05-29 VITALS
HEIGHT: 69 IN | SYSTOLIC BLOOD PRESSURE: 121 MMHG | TEMPERATURE: 97.5 F | RESPIRATION RATE: 16 BRPM | WEIGHT: 171 LBS | HEART RATE: 68 BPM | BODY MASS INDEX: 25.33 KG/M2 | DIASTOLIC BLOOD PRESSURE: 64 MMHG

## 2018-05-29 DIAGNOSIS — K52.1 TOXIC GASTROENTERITIS AND COLITIS: ICD-10-CM

## 2018-05-29 LAB
HCT VFR BLD CALC: 27.4 %
HGB BLD-MCNC: 9.4 G/DL
MCHC RBC-ENTMCNC: 32.5 PG
MCHC RBC-ENTMCNC: 34.3 G/DL
MCV RBC AUTO: 94.8 FL
PLATELET # BLD AUTO: 171 K/UL
PMV BLD: 10.9 FL
RBC # BLD: 2.89 M/UL
RBC # FLD: 18 %
WBC # FLD AUTO: 13.17 K/UL

## 2018-05-29 RX ORDER — ATROPINE SULFATE 0.1 MG/ML
0.5 SYRINGE (ML) INJECTION ONCE
Qty: 0 | Refills: 0 | Status: COMPLETED | OUTPATIENT
Start: 2018-05-29 | End: 2018-05-29

## 2018-05-29 RX ORDER — FOSAPREPITANT DIMEGLUMINE 150 MG/5ML
150 INJECTION, POWDER, LYOPHILIZED, FOR SOLUTION INTRAVENOUS ONCE
Qty: 0 | Refills: 0 | Status: COMPLETED | OUTPATIENT
Start: 2018-05-29 | End: 2018-05-29

## 2018-05-29 RX ORDER — FLUOROURACIL 50 MG/ML
800 INJECTION, SOLUTION INTRAVENOUS ONCE
Qty: 0 | Refills: 0 | Status: COMPLETED | OUTPATIENT
Start: 2018-05-29 | End: 2018-05-29

## 2018-05-29 RX ORDER — DEXAMETHASONE 0.5 MG/5ML
12 ELIXIR ORAL ONCE
Qty: 0 | Refills: 0 | Status: COMPLETED | OUTPATIENT
Start: 2018-05-29 | End: 2018-05-29

## 2018-05-29 RX ORDER — IRINOTECAN HYDROCHLORIDE 100 MG/5ML
360 INJECTION, SOLUTION INTRAVENOUS ONCE
Qty: 0 | Refills: 0 | Status: COMPLETED | OUTPATIENT
Start: 2018-05-29 | End: 2018-05-29

## 2018-05-29 RX ORDER — LEUCOVORIN CALCIUM 5 MG
800 TABLET ORAL ONCE
Qty: 0 | Refills: 0 | Status: COMPLETED | OUTPATIENT
Start: 2018-05-29 | End: 2018-05-29

## 2018-05-29 RX ORDER — FLUOROURACIL 50 MG/ML
4800 INJECTION, SOLUTION INTRAVENOUS ONCE
Qty: 0 | Refills: 0 | Status: COMPLETED | OUTPATIENT
Start: 2018-05-29 | End: 2018-05-29

## 2018-05-29 RX ADMIN — FLUOROURACIL 4.78 MILLIGRAM(S): 50 INJECTION, SOLUTION INTRAVENOUS at 12:38

## 2018-05-29 RX ADMIN — FLUOROURACIL 192 MILLIGRAM(S): 50 INJECTION, SOLUTION INTRAVENOUS at 12:37

## 2018-05-29 RX ADMIN — Medication 145 MILLIGRAM(S): at 12:37

## 2018-05-29 RX ADMIN — Medication 0.5 MILLIGRAM(S): at 11:53

## 2018-05-29 RX ADMIN — IRINOTECAN HYDROCHLORIDE 259 MILLIGRAM(S): 100 INJECTION, SOLUTION INTRAVENOUS at 12:37

## 2018-05-29 RX ADMIN — Medication 183 MILLIGRAM(S): at 11:52

## 2018-05-29 RX ADMIN — FOSAPREPITANT DIMEGLUMINE 450 MILLIGRAM(S): 150 INJECTION, POWDER, LYOPHILIZED, FOR SOLUTION INTRAVENOUS at 11:52

## 2018-05-30 LAB
ALBUMIN SERPL ELPH-MCNC: 4 G/DL
ALP BLD-CCNC: 157 U/L
ALT SERPL-CCNC: 12 U/L
ANION GAP SERPL CALC-SCNC: 14 MMOL/L
AST SERPL-CCNC: 16 U/L
BILIRUB SERPL-MCNC: 0.3 MG/DL
BUN SERPL-MCNC: 19 MG/DL
CALCIUM SERPL-MCNC: 8.9 MG/DL
CHLORIDE SERPL-SCNC: 105 MMOL/L
CO2 SERPL-SCNC: 25 MMOL/L
CREAT SERPL-MCNC: 1.8 MG/DL
GLUCOSE SERPL-MCNC: 90 MG/DL
MAGNESIUM SERPL-MCNC: 1.8 MG/DL
POTASSIUM SERPL-SCNC: 3.9 MMOL/L
PROT SERPL-MCNC: 6 G/DL
SODIUM SERPL-SCNC: 144 MMOL/L

## 2018-05-31 ENCOUNTER — APPOINTMENT (OUTPATIENT)
Dept: INFUSION THERAPY | Facility: CLINIC | Age: 73
End: 2018-05-31

## 2018-05-31 RX ORDER — PEGFILGRASTIM-CBQV 6 MG/.6ML
6 INJECTION, SOLUTION SUBCUTANEOUS ONCE
Qty: 0 | Refills: 0 | Status: COMPLETED | OUTPATIENT
Start: 2018-05-31 | End: 2018-05-31

## 2018-05-31 RX ADMIN — PEGFILGRASTIM-CBQV 6 MILLIGRAM(S): 6 INJECTION, SOLUTION SUBCUTANEOUS at 12:00

## 2018-06-05 ENCOUNTER — APPOINTMENT (OUTPATIENT)
Dept: HEMATOLOGY ONCOLOGY | Facility: CLINIC | Age: 73
End: 2018-06-05

## 2018-06-05 ENCOUNTER — LABORATORY RESULT (OUTPATIENT)
Age: 73
End: 2018-06-05

## 2018-06-06 LAB
HCT VFR BLD CALC: 24.4 %
HGB BLD-MCNC: 8.7 G/DL
MCHC RBC-ENTMCNC: 33.1 PG
MCHC RBC-ENTMCNC: 35.7 G/DL
MCV RBC AUTO: 92.8 FL
PLATELET # BLD AUTO: 121 K/UL
PMV BLD: 11.2 FL
RBC # BLD: 2.63 M/UL
RBC # FLD: 17.5 %
WBC # FLD AUTO: 15.33 K/UL

## 2018-06-12 ENCOUNTER — APPOINTMENT (OUTPATIENT)
Dept: INFUSION THERAPY | Facility: CLINIC | Age: 73
End: 2018-06-12

## 2018-06-12 ENCOUNTER — APPOINTMENT (OUTPATIENT)
Dept: HEMATOLOGY ONCOLOGY | Facility: CLINIC | Age: 73
End: 2018-06-12

## 2018-06-12 ENCOUNTER — LABORATORY RESULT (OUTPATIENT)
Age: 73
End: 2018-06-12

## 2018-06-12 VITALS
HEART RATE: 65 BPM | SYSTOLIC BLOOD PRESSURE: 130 MMHG | TEMPERATURE: 98.2 F | BODY MASS INDEX: 25.33 KG/M2 | WEIGHT: 171 LBS | HEIGHT: 69 IN | DIASTOLIC BLOOD PRESSURE: 63 MMHG

## 2018-06-12 LAB
HCT VFR BLD CALC: 26.7 %
HGB BLD-MCNC: 9.3 G/DL
MCHC RBC-ENTMCNC: 33 PG
MCHC RBC-ENTMCNC: 34.8 G/DL
MCV RBC AUTO: 94.7 FL
PLATELET # BLD AUTO: 157 K/UL
PMV BLD: 11.1 FL
RBC # BLD: 2.82 M/UL
RBC # FLD: 18.5 %
WBC # FLD AUTO: 15.7 K/UL

## 2018-06-12 RX ORDER — FLUOROURACIL 50 MG/ML
800 INJECTION, SOLUTION INTRAVENOUS ONCE
Qty: 0 | Refills: 0 | Status: COMPLETED | OUTPATIENT
Start: 2018-06-12 | End: 2018-06-12

## 2018-06-12 RX ORDER — ATROPINE SULFATE 0.1 MG/ML
0.5 SYRINGE (ML) INJECTION ONCE
Qty: 0 | Refills: 0 | Status: COMPLETED | OUTPATIENT
Start: 2018-06-12 | End: 2018-06-12

## 2018-06-12 RX ORDER — FLUOROURACIL 50 MG/ML
4800 INJECTION, SOLUTION INTRAVENOUS ONCE
Qty: 0 | Refills: 0 | Status: COMPLETED | OUTPATIENT
Start: 2018-06-12 | End: 2018-06-12

## 2018-06-12 RX ORDER — DEXAMETHASONE 0.5 MG/5ML
12 ELIXIR ORAL ONCE
Qty: 0 | Refills: 0 | Status: COMPLETED | OUTPATIENT
Start: 2018-06-12 | End: 2018-06-12

## 2018-06-12 RX ORDER — FOSAPREPITANT DIMEGLUMINE 150 MG/5ML
150 INJECTION, POWDER, LYOPHILIZED, FOR SOLUTION INTRAVENOUS ONCE
Qty: 0 | Refills: 0 | Status: COMPLETED | OUTPATIENT
Start: 2018-06-12 | End: 2018-06-12

## 2018-06-12 RX ORDER — IRINOTECAN HYDROCHLORIDE 100 MG/5ML
360 INJECTION, SOLUTION INTRAVENOUS ONCE
Qty: 0 | Refills: 0 | Status: COMPLETED | OUTPATIENT
Start: 2018-06-12 | End: 2018-06-12

## 2018-06-12 RX ORDER — LEUCOVORIN CALCIUM 5 MG
800 TABLET ORAL ONCE
Qty: 0 | Refills: 0 | Status: COMPLETED | OUTPATIENT
Start: 2018-06-12 | End: 2018-06-12

## 2018-06-12 RX ADMIN — FOSAPREPITANT DIMEGLUMINE 450 MILLIGRAM(S): 150 INJECTION, POWDER, LYOPHILIZED, FOR SOLUTION INTRAVENOUS at 11:28

## 2018-06-12 RX ADMIN — FLUOROURACIL 4.78 MILLIGRAM(S): 50 INJECTION, SOLUTION INTRAVENOUS at 12:13

## 2018-06-12 RX ADMIN — Medication 183 MILLIGRAM(S): at 11:28

## 2018-06-12 RX ADMIN — IRINOTECAN HYDROCHLORIDE 259 MILLIGRAM(S): 100 INJECTION, SOLUTION INTRAVENOUS at 12:13

## 2018-06-12 RX ADMIN — Medication 0.5 MILLIGRAM(S): at 11:28

## 2018-06-12 RX ADMIN — FLUOROURACIL 192 MILLIGRAM(S): 50 INJECTION, SOLUTION INTRAVENOUS at 12:14

## 2018-06-12 RX ADMIN — Medication 145 MILLIGRAM(S): at 12:13

## 2018-06-13 LAB
ALBUMIN SERPL ELPH-MCNC: 4.1 G/DL
ALP BLD-CCNC: 154 U/L
ALT SERPL-CCNC: 14 U/L
ANION GAP SERPL CALC-SCNC: 17 MMOL/L
AST SERPL-CCNC: 17 U/L
BILIRUB SERPL-MCNC: 0.2 MG/DL
BUN SERPL-MCNC: 15 MG/DL
CALCIUM SERPL-MCNC: 9.3 MG/DL
CANCER AG19-9 SERPL-ACNC: 137 U/ML
CHLORIDE SERPL-SCNC: 101 MMOL/L
CO2 SERPL-SCNC: 25 MMOL/L
CREAT SERPL-MCNC: 1.6 MG/DL
GLUCOSE SERPL-MCNC: 112 MG/DL
MAGNESIUM SERPL-MCNC: 1.7 MG/DL
POTASSIUM SERPL-SCNC: 4 MMOL/L
PROT SERPL-MCNC: 6.4 G/DL
SODIUM SERPL-SCNC: 143 MMOL/L

## 2018-06-14 ENCOUNTER — OUTPATIENT (OUTPATIENT)
Dept: OUTPATIENT SERVICES | Facility: HOSPITAL | Age: 73
LOS: 1 days | Discharge: HOME | End: 2018-06-14

## 2018-06-14 ENCOUNTER — APPOINTMENT (OUTPATIENT)
Dept: INFUSION THERAPY | Facility: CLINIC | Age: 73
End: 2018-06-14

## 2018-06-14 DIAGNOSIS — D64.9 ANEMIA, UNSPECIFIED: ICD-10-CM

## 2018-06-14 DIAGNOSIS — C7A.8 OTHER MALIGNANT NEUROENDOCRINE TUMORS: ICD-10-CM

## 2018-06-14 DIAGNOSIS — Z51.11 ENCOUNTER FOR ANTINEOPLASTIC CHEMOTHERAPY: ICD-10-CM

## 2018-06-14 RX ORDER — PEGFILGRASTIM-CBQV 6 MG/.6ML
6 INJECTION, SOLUTION SUBCUTANEOUS ONCE
Qty: 0 | Refills: 0 | Status: COMPLETED | OUTPATIENT
Start: 2018-06-14 | End: 2018-06-14

## 2018-06-14 RX ADMIN — PEGFILGRASTIM-CBQV 6 MILLIGRAM(S): 6 INJECTION, SOLUTION SUBCUTANEOUS at 11:13

## 2018-06-19 ENCOUNTER — APPOINTMENT (OUTPATIENT)
Dept: HEMATOLOGY ONCOLOGY | Facility: CLINIC | Age: 73
End: 2018-06-19

## 2018-06-19 ENCOUNTER — LABORATORY RESULT (OUTPATIENT)
Age: 73
End: 2018-06-19

## 2018-06-19 LAB
HCT VFR BLD CALC: 23.6 %
HGB BLD-MCNC: 8.3 G/DL
MCHC RBC-ENTMCNC: 32.3 PG
MCHC RBC-ENTMCNC: 35.2 G/DL
MCV RBC AUTO: 91.8 FL
PLATELET # BLD AUTO: 93 K/UL
PMV BLD: 11.3 FL
RBC # BLD: 2.57 M/UL
RBC # FLD: 17.5 %
WBC # FLD AUTO: 13.86 K/UL

## 2018-06-26 ENCOUNTER — APPOINTMENT (OUTPATIENT)
Dept: HEMATOLOGY ONCOLOGY | Facility: CLINIC | Age: 73
End: 2018-06-26

## 2018-06-26 ENCOUNTER — APPOINTMENT (OUTPATIENT)
Dept: INFUSION THERAPY | Facility: CLINIC | Age: 73
End: 2018-06-26

## 2018-06-26 ENCOUNTER — LABORATORY RESULT (OUTPATIENT)
Age: 73
End: 2018-06-26

## 2018-06-26 RX ORDER — IRINOTECAN HYDROCHLORIDE 100 MG/5ML
360 INJECTION, SOLUTION INTRAVENOUS ONCE
Qty: 0 | Refills: 0 | Status: DISCONTINUED | OUTPATIENT
Start: 2018-06-26 | End: 2018-06-26

## 2018-06-26 RX ORDER — FLUOROURACIL 50 MG/ML
800 INJECTION, SOLUTION INTRAVENOUS ONCE
Qty: 0 | Refills: 0 | Status: COMPLETED | OUTPATIENT
Start: 2018-06-26 | End: 2018-06-26

## 2018-06-26 RX ORDER — ATROPINE SULFATE 0.1 MG/ML
0.5 SYRINGE (ML) INJECTION ONCE
Qty: 0 | Refills: 0 | Status: COMPLETED | OUTPATIENT
Start: 2018-06-26 | End: 2018-06-26

## 2018-06-26 RX ORDER — FLUOROURACIL 50 MG/ML
4800 INJECTION, SOLUTION INTRAVENOUS ONCE
Qty: 0 | Refills: 0 | Status: COMPLETED | OUTPATIENT
Start: 2018-06-26 | End: 2018-06-26

## 2018-06-26 RX ORDER — IRINOTECAN HYDROCHLORIDE 100 MG/5ML
360 INJECTION, SOLUTION INTRAVENOUS ONCE
Qty: 0 | Refills: 0 | Status: COMPLETED | OUTPATIENT
Start: 2018-06-26 | End: 2018-06-26

## 2018-06-26 RX ORDER — DEXAMETHASONE 0.5 MG/5ML
12 ELIXIR ORAL ONCE
Qty: 0 | Refills: 0 | Status: COMPLETED | OUTPATIENT
Start: 2018-06-26 | End: 2018-06-26

## 2018-06-26 RX ORDER — FOSAPREPITANT DIMEGLUMINE 150 MG/5ML
150 INJECTION, POWDER, LYOPHILIZED, FOR SOLUTION INTRAVENOUS ONCE
Qty: 0 | Refills: 0 | Status: COMPLETED | OUTPATIENT
Start: 2018-06-26 | End: 2018-06-26

## 2018-06-26 RX ORDER — LEUCOVORIN CALCIUM 5 MG
800 TABLET ORAL ONCE
Qty: 0 | Refills: 0 | Status: COMPLETED | OUTPATIENT
Start: 2018-06-26 | End: 2018-06-26

## 2018-06-26 RX ADMIN — FLUOROURACIL 192 MILLIGRAM(S): 50 INJECTION, SOLUTION INTRAVENOUS at 13:53

## 2018-06-26 RX ADMIN — Medication 183 MILLIGRAM(S): at 12:24

## 2018-06-26 RX ADMIN — Medication 0.5 MILLIGRAM(S): at 12:25

## 2018-06-26 RX ADMIN — IRINOTECAN HYDROCHLORIDE 259 MILLIGRAM(S): 100 INJECTION, SOLUTION INTRAVENOUS at 13:53

## 2018-06-26 RX ADMIN — FLUOROURACIL 4.78 MILLIGRAM(S): 50 INJECTION, SOLUTION INTRAVENOUS at 13:54

## 2018-06-26 RX ADMIN — Medication 145 MILLIGRAM(S): at 13:53

## 2018-06-26 RX ADMIN — FOSAPREPITANT DIMEGLUMINE 450 MILLIGRAM(S): 150 INJECTION, POWDER, LYOPHILIZED, FOR SOLUTION INTRAVENOUS at 12:24

## 2018-06-28 ENCOUNTER — APPOINTMENT (OUTPATIENT)
Dept: INFUSION THERAPY | Facility: CLINIC | Age: 73
End: 2018-06-28

## 2018-06-28 RX ORDER — PEGFILGRASTIM-CBQV 6 MG/.6ML
6 INJECTION, SOLUTION SUBCUTANEOUS ONCE
Qty: 0 | Refills: 0 | Status: DISCONTINUED | OUTPATIENT
Start: 2018-06-28 | End: 2018-12-23

## 2018-07-02 LAB
HCT VFR BLD CALC: 25.6 %
HGB BLD-MCNC: 8.9 G/DL
MCHC RBC-ENTMCNC: 32.6 PG
MCHC RBC-ENTMCNC: 34.8 G/DL
MCV RBC AUTO: 93.8 FL
PLATELET # BLD AUTO: 144 K/UL
PMV BLD: 13 FL
RBC # BLD: 2.73 M/UL
RBC # FLD: 17.9 %
WBC # FLD AUTO: 14.39 K/UL

## 2018-07-03 ENCOUNTER — LABORATORY RESULT (OUTPATIENT)
Age: 73
End: 2018-07-03

## 2018-07-03 ENCOUNTER — APPOINTMENT (OUTPATIENT)
Dept: HEMATOLOGY ONCOLOGY | Facility: CLINIC | Age: 73
End: 2018-07-03

## 2018-07-09 LAB
HCT VFR BLD CALC: 25 %
HGB BLD-MCNC: 9 G/DL
MCHC RBC-ENTMCNC: 32.8 PG
MCHC RBC-ENTMCNC: 36 G/DL
MCV RBC AUTO: 91.2 FL
PLATELET # BLD AUTO: 113 K/UL
PMV BLD: 11.6 FL
RBC # BLD: 2.74 M/UL
RBC # FLD: 17.1 %
WBC # FLD AUTO: 14.49 K/UL

## 2018-07-10 ENCOUNTER — APPOINTMENT (OUTPATIENT)
Dept: INFUSION THERAPY | Facility: CLINIC | Age: 73
End: 2018-07-10

## 2018-07-10 ENCOUNTER — LABORATORY RESULT (OUTPATIENT)
Age: 73
End: 2018-07-10

## 2018-07-10 ENCOUNTER — APPOINTMENT (OUTPATIENT)
Dept: HEMATOLOGY ONCOLOGY | Facility: CLINIC | Age: 73
End: 2018-07-10

## 2018-07-10 VITALS
RESPIRATION RATE: 16 BRPM | HEIGHT: 69 IN | WEIGHT: 172 LBS | HEART RATE: 64 BPM | SYSTOLIC BLOOD PRESSURE: 136 MMHG | DIASTOLIC BLOOD PRESSURE: 69 MMHG | TEMPERATURE: 97.6 F | BODY MASS INDEX: 25.48 KG/M2

## 2018-07-10 LAB
HCT VFR BLD CALC: 27 %
HGB BLD-MCNC: 9.3 G/DL
MCHC RBC-ENTMCNC: 32.2 PG
MCHC RBC-ENTMCNC: 34.4 G/DL
MCV RBC AUTO: 93.4 FL
PLATELET # BLD AUTO: 159 K/UL
PMV BLD: 11.4 FL
RBC # BLD: 2.89 M/UL
RBC # FLD: 17.4 %
WBC # FLD AUTO: 13.23 K/UL

## 2018-07-10 RX ORDER — FOSAPREPITANT DIMEGLUMINE 150 MG/5ML
150 INJECTION, POWDER, LYOPHILIZED, FOR SOLUTION INTRAVENOUS ONCE
Qty: 0 | Refills: 0 | Status: COMPLETED | OUTPATIENT
Start: 2018-07-10 | End: 2018-07-10

## 2018-07-10 RX ORDER — FLUOROURACIL 50 MG/ML
800 INJECTION, SOLUTION INTRAVENOUS ONCE
Qty: 0 | Refills: 0 | Status: COMPLETED | OUTPATIENT
Start: 2018-07-10 | End: 2018-07-10

## 2018-07-10 RX ORDER — FLUOROURACIL 50 MG/ML
4800 INJECTION, SOLUTION INTRAVENOUS ONCE
Qty: 0 | Refills: 0 | Status: COMPLETED | OUTPATIENT
Start: 2018-07-10 | End: 2018-07-10

## 2018-07-10 RX ORDER — ATROPINE SULFATE 0.1 MG/ML
0.5 SYRINGE (ML) INJECTION ONCE
Qty: 0 | Refills: 0 | Status: COMPLETED | OUTPATIENT
Start: 2018-07-10 | End: 2018-07-10

## 2018-07-10 RX ORDER — DEXAMETHASONE 0.5 MG/5ML
12 ELIXIR ORAL ONCE
Qty: 0 | Refills: 0 | Status: COMPLETED | OUTPATIENT
Start: 2018-07-10 | End: 2018-07-10

## 2018-07-10 RX ORDER — LEUCOVORIN CALCIUM 5 MG
800 TABLET ORAL ONCE
Qty: 0 | Refills: 0 | Status: COMPLETED | OUTPATIENT
Start: 2018-07-10 | End: 2018-07-10

## 2018-07-10 RX ORDER — IRINOTECAN HYDROCHLORIDE 100 MG/5ML
360 INJECTION, SOLUTION INTRAVENOUS ONCE
Qty: 0 | Refills: 0 | Status: COMPLETED | OUTPATIENT
Start: 2018-07-10 | End: 2018-07-10

## 2018-07-10 RX ADMIN — IRINOTECAN HYDROCHLORIDE 259 MILLIGRAM(S): 100 INJECTION, SOLUTION INTRAVENOUS at 11:53

## 2018-07-10 RX ADMIN — FLUOROURACIL 4.78 MILLIGRAM(S): 50 INJECTION, SOLUTION INTRAVENOUS at 11:55

## 2018-07-10 RX ADMIN — FLUOROURACIL 192 MILLIGRAM(S): 50 INJECTION, SOLUTION INTRAVENOUS at 11:54

## 2018-07-10 RX ADMIN — Medication 145 MILLIGRAM(S): at 11:55

## 2018-07-10 RX ADMIN — Medication 0.5 MILLIGRAM(S): at 10:58

## 2018-07-10 RX ADMIN — Medication 183 MILLIGRAM(S): at 10:57

## 2018-07-10 RX ADMIN — FOSAPREPITANT DIMEGLUMINE 450 MILLIGRAM(S): 150 INJECTION, POWDER, LYOPHILIZED, FOR SOLUTION INTRAVENOUS at 10:57

## 2018-07-11 LAB
CANCER AG19-9 SERPL-ACNC: 130.7 U/ML
MAGNESIUM SERPL-MCNC: 1.6 MG/DL

## 2018-07-12 ENCOUNTER — APPOINTMENT (OUTPATIENT)
Dept: INFUSION THERAPY | Facility: CLINIC | Age: 73
End: 2018-07-12

## 2018-07-12 RX ORDER — MAGNESIUM SULFATE 500 MG/ML
2 VIAL (ML) INJECTION ONCE
Qty: 0 | Refills: 0 | Status: COMPLETED | OUTPATIENT
Start: 2018-07-12 | End: 2018-07-12

## 2018-07-12 RX ORDER — PEGFILGRASTIM-CBQV 6 MG/.6ML
6 INJECTION, SOLUTION SUBCUTANEOUS ONCE
Qty: 0 | Refills: 0 | Status: COMPLETED | OUTPATIENT
Start: 2018-07-12 | End: 2018-07-12

## 2018-07-12 RX ADMIN — Medication 50 GRAM(S): at 15:46

## 2018-07-12 RX ADMIN — PEGFILGRASTIM-CBQV 6 MILLIGRAM(S): 6 INJECTION, SOLUTION SUBCUTANEOUS at 15:46

## 2018-07-22 PROBLEM — Z80.0 FAMILY HISTORY OF COLON CANCER: Status: ACTIVE | Noted: 2017-08-14

## 2018-07-22 PROBLEM — Z86.73 HISTORY OF STROKE: Status: RESOLVED | Noted: 2017-08-14 | Resolved: 2018-07-22

## 2018-07-23 ENCOUNTER — OUTPATIENT (OUTPATIENT)
Dept: OUTPATIENT SERVICES | Facility: HOSPITAL | Age: 73
LOS: 1 days | Discharge: HOME | End: 2018-07-23

## 2018-07-23 DIAGNOSIS — C7A.8 OTHER MALIGNANT NEUROENDOCRINE TUMORS: ICD-10-CM

## 2018-07-24 ENCOUNTER — APPOINTMENT (OUTPATIENT)
Dept: INFUSION THERAPY | Facility: CLINIC | Age: 73
End: 2018-07-24

## 2018-07-24 ENCOUNTER — APPOINTMENT (OUTPATIENT)
Dept: HEMATOLOGY ONCOLOGY | Facility: CLINIC | Age: 73
End: 2018-07-24

## 2018-07-24 ENCOUNTER — LABORATORY RESULT (OUTPATIENT)
Age: 73
End: 2018-07-24

## 2018-07-24 VITALS
TEMPERATURE: 97.9 F | SYSTOLIC BLOOD PRESSURE: 144 MMHG | WEIGHT: 172 LBS | HEIGHT: 69 IN | DIASTOLIC BLOOD PRESSURE: 72 MMHG | BODY MASS INDEX: 25.48 KG/M2 | HEART RATE: 65 BPM | RESPIRATION RATE: 16 BRPM

## 2018-07-24 DIAGNOSIS — R11.2 NAUSEA WITH VOMITING, UNSPECIFIED: ICD-10-CM

## 2018-07-24 DIAGNOSIS — T45.1X5A NAUSEA WITH VOMITING, UNSPECIFIED: ICD-10-CM

## 2018-07-24 LAB
HCT VFR BLD CALC: 26.3 %
HGB BLD-MCNC: 9.1 G/DL
MCHC RBC-ENTMCNC: 31.6 PG
MCHC RBC-ENTMCNC: 34.6 G/DL
MCV RBC AUTO: 91.3 FL
PLATELET # BLD AUTO: 169 K/UL
PMV BLD: 11.9 FL
RBC # BLD: 2.88 M/UL
RBC # FLD: 17.5 %
WBC # FLD AUTO: 14 K/UL

## 2018-07-24 RX ORDER — TOPOTECAN 4 MG/4ML
1.9 INJECTION, POWDER, LYOPHILIZED, FOR SOLUTION INTRAVENOUS ONCE
Qty: 0 | Refills: 0 | Status: COMPLETED | OUTPATIENT
Start: 2018-07-24 | End: 2018-07-24

## 2018-07-24 RX ORDER — DEXAMETHASONE 0.5 MG/5ML
8 ELIXIR ORAL ONCE
Qty: 0 | Refills: 0 | Status: COMPLETED | OUTPATIENT
Start: 2018-07-24 | End: 2018-07-24

## 2018-07-24 RX ADMIN — TOPOTECAN 203.8 MILLIGRAM(S): 4 INJECTION, POWDER, LYOPHILIZED, FOR SOLUTION INTRAVENOUS at 12:18

## 2018-07-24 RX ADMIN — Medication 176.4 MILLIGRAM(S): at 12:12

## 2018-07-25 ENCOUNTER — APPOINTMENT (OUTPATIENT)
Dept: INFUSION THERAPY | Facility: CLINIC | Age: 73
End: 2018-07-25

## 2018-07-25 LAB
ALBUMIN SERPL ELPH-MCNC: 4 G/DL
ALP BLD-CCNC: 173 U/L
ALT SERPL-CCNC: 13 U/L
ANION GAP SERPL CALC-SCNC: 16 MMOL/L
AST SERPL-CCNC: 19 U/L
BILIRUB SERPL-MCNC: 0.2 MG/DL
BUN SERPL-MCNC: 20 MG/DL
CALCIUM SERPL-MCNC: 8.9 MG/DL
CANCER AG19-9 SERPL-ACNC: 119.3 U/ML
CHLORIDE SERPL-SCNC: 103 MMOL/L
CO2 SERPL-SCNC: 22 MMOL/L
CREAT SERPL-MCNC: 1.7 MG/DL
GLUCOSE SERPL-MCNC: 82 MG/DL
MAGNESIUM SERPL-MCNC: 1.7 MG/DL
POTASSIUM SERPL-SCNC: 4.2 MMOL/L
PROT SERPL-MCNC: 6.5 G/DL
SODIUM SERPL-SCNC: 141 MMOL/L

## 2018-07-25 RX ORDER — DEXAMETHASONE 0.5 MG/5ML
8 ELIXIR ORAL ONCE
Qty: 0 | Refills: 0 | Status: COMPLETED | OUTPATIENT
Start: 2018-07-25 | End: 2018-07-25

## 2018-07-25 RX ORDER — TOPOTECAN 4 MG/4ML
1.9 INJECTION, POWDER, LYOPHILIZED, FOR SOLUTION INTRAVENOUS ONCE
Qty: 0 | Refills: 0 | Status: COMPLETED | OUTPATIENT
Start: 2018-07-25 | End: 2018-07-25

## 2018-07-25 RX ORDER — SODIUM CHLORIDE 9 MG/ML
500 INJECTION INTRAMUSCULAR; INTRAVENOUS; SUBCUTANEOUS
Qty: 0 | Refills: 0 | Status: COMPLETED | OUTPATIENT
Start: 2018-07-25 | End: 2018-07-25

## 2018-07-25 RX ADMIN — TOPOTECAN 203.8 MILLIGRAM(S): 4 INJECTION, POWDER, LYOPHILIZED, FOR SOLUTION INTRAVENOUS at 12:21

## 2018-07-25 RX ADMIN — Medication 176.4 MILLIGRAM(S): at 12:06

## 2018-07-25 RX ADMIN — SODIUM CHLORIDE 500 MILLILITER(S): 9 INJECTION INTRAMUSCULAR; INTRAVENOUS; SUBCUTANEOUS at 12:06

## 2018-07-26 ENCOUNTER — APPOINTMENT (OUTPATIENT)
Dept: INFUSION THERAPY | Facility: CLINIC | Age: 73
End: 2018-07-26

## 2018-07-26 PROBLEM — R11.2 CHEMOTHERAPY INDUCED NAUSEA AND VOMITING: Status: ACTIVE | Noted: 2017-08-14

## 2018-07-26 RX ORDER — DEXAMETHASONE 0.5 MG/5ML
8 ELIXIR ORAL ONCE
Qty: 0 | Refills: 0 | Status: COMPLETED | OUTPATIENT
Start: 2018-07-26 | End: 2018-07-26

## 2018-07-26 RX ORDER — TOPOTECAN 4 MG/4ML
1.9 INJECTION, POWDER, LYOPHILIZED, FOR SOLUTION INTRAVENOUS ONCE
Qty: 0 | Refills: 0 | Status: COMPLETED | OUTPATIENT
Start: 2018-07-26 | End: 2018-07-26

## 2018-07-26 RX ADMIN — TOPOTECAN 203.8 MILLIGRAM(S): 4 INJECTION, POWDER, LYOPHILIZED, FOR SOLUTION INTRAVENOUS at 13:28

## 2018-07-26 RX ADMIN — Medication 176.4 MILLIGRAM(S): at 13:28

## 2018-07-27 ENCOUNTER — APPOINTMENT (OUTPATIENT)
Dept: INFUSION THERAPY | Facility: CLINIC | Age: 73
End: 2018-07-27

## 2018-07-27 RX ORDER — PEGFILGRASTIM-CBQV 6 MG/.6ML
6 INJECTION, SOLUTION SUBCUTANEOUS ONCE
Qty: 0 | Refills: 0 | Status: COMPLETED | OUTPATIENT
Start: 2018-07-27 | End: 2018-07-27

## 2018-07-27 RX ORDER — TOPOTECAN 4 MG/4ML
1.9 INJECTION, POWDER, LYOPHILIZED, FOR SOLUTION INTRAVENOUS ONCE
Qty: 0 | Refills: 0 | Status: COMPLETED | OUTPATIENT
Start: 2018-07-27 | End: 2018-07-27

## 2018-07-27 RX ORDER — DEXAMETHASONE 0.5 MG/5ML
8 ELIXIR ORAL ONCE
Qty: 0 | Refills: 0 | Status: COMPLETED | OUTPATIENT
Start: 2018-07-27 | End: 2018-07-27

## 2018-07-27 RX ADMIN — TOPOTECAN 203.8 MILLIGRAM(S): 4 INJECTION, POWDER, LYOPHILIZED, FOR SOLUTION INTRAVENOUS at 13:04

## 2018-07-27 RX ADMIN — Medication 176.4 MILLIGRAM(S): at 13:03

## 2018-07-27 RX ADMIN — PEGFILGRASTIM-CBQV 6 MILLIGRAM(S): 6 INJECTION, SOLUTION SUBCUTANEOUS at 13:36

## 2018-07-31 ENCOUNTER — APPOINTMENT (OUTPATIENT)
Dept: HEMATOLOGY ONCOLOGY | Facility: CLINIC | Age: 73
End: 2018-07-31

## 2018-07-31 ENCOUNTER — LABORATORY RESULT (OUTPATIENT)
Age: 73
End: 2018-07-31

## 2018-07-31 LAB
HCT VFR BLD CALC: 20.8 %
HGB BLD-MCNC: 7.2 G/DL
MCHC RBC-ENTMCNC: 31.4 PG
MCHC RBC-ENTMCNC: 34.6 G/DL
MCV RBC AUTO: 90.8 FL
PLATELET # BLD AUTO: 69 K/UL
PMV BLD: 12.2 FL
RBC # BLD: 2.29 M/UL
RBC # FLD: 17 %
WBC # FLD AUTO: 4.96 K/UL

## 2018-08-01 ENCOUNTER — APPOINTMENT (OUTPATIENT)
Dept: INFUSION THERAPY | Facility: CLINIC | Age: 73
End: 2018-08-01

## 2018-08-01 LAB
ABO + RH PNL BLD: NORMAL
BLD GP AB SCN SERPL QL: NORMAL

## 2018-08-07 ENCOUNTER — LABORATORY RESULT (OUTPATIENT)
Age: 73
End: 2018-08-07

## 2018-08-07 ENCOUNTER — APPOINTMENT (OUTPATIENT)
Dept: HEMATOLOGY ONCOLOGY | Facility: CLINIC | Age: 73
End: 2018-08-07

## 2018-08-07 ENCOUNTER — APPOINTMENT (OUTPATIENT)
Dept: INFUSION THERAPY | Facility: CLINIC | Age: 73
End: 2018-08-07

## 2018-08-07 VITALS
TEMPERATURE: 97.2 F | HEIGHT: 69 IN | BODY MASS INDEX: 25.48 KG/M2 | DIASTOLIC BLOOD PRESSURE: 67 MMHG | SYSTOLIC BLOOD PRESSURE: 139 MMHG | HEART RATE: 61 BPM | WEIGHT: 172 LBS

## 2018-08-07 LAB
HCT VFR BLD CALC: 23.4 %
HGB BLD-MCNC: 8.1 G/DL
MCHC RBC-ENTMCNC: 31.3 PG
MCHC RBC-ENTMCNC: 34.6 G/DL
MCV RBC AUTO: 90.3 FL
PLATELET # BLD AUTO: 53 K/UL
PMV BLD: 10.5 FL
RBC # BLD: 2.59 M/UL
RBC # FLD: 17.2 %
WBC # FLD AUTO: 8.27 K/UL

## 2018-08-08 LAB
ALBUMIN SERPL ELPH-MCNC: 3.9 G/DL
ALP BLD-CCNC: 175 U/L
ALT SERPL-CCNC: 17 U/L
ANION GAP SERPL CALC-SCNC: 17 MMOL/L
AST SERPL-CCNC: 19 U/L
BILIRUB SERPL-MCNC: 0.3 MG/DL
BUN SERPL-MCNC: 19 MG/DL
CALCIUM SERPL-MCNC: 8.8 MG/DL
CHLORIDE SERPL-SCNC: 104 MMOL/L
CO2 SERPL-SCNC: 24 MMOL/L
CREAT SERPL-MCNC: 1.7 MG/DL
GLUCOSE SERPL-MCNC: 127 MG/DL
POTASSIUM SERPL-SCNC: 4.6 MMOL/L
PROT SERPL-MCNC: 6.6 G/DL
SODIUM SERPL-SCNC: 145 MMOL/L

## 2018-08-09 ENCOUNTER — APPOINTMENT (OUTPATIENT)
Dept: INFUSION THERAPY | Facility: CLINIC | Age: 73
End: 2018-08-09

## 2018-08-13 ENCOUNTER — APPOINTMENT (OUTPATIENT)
Dept: HEMATOLOGY ONCOLOGY | Facility: CLINIC | Age: 73
End: 2018-08-13

## 2018-08-13 ENCOUNTER — LABORATORY RESULT (OUTPATIENT)
Age: 73
End: 2018-08-13

## 2018-08-13 VITALS
SYSTOLIC BLOOD PRESSURE: 158 MMHG | BODY MASS INDEX: 25.18 KG/M2 | HEART RATE: 64 BPM | HEIGHT: 69 IN | WEIGHT: 170 LBS | TEMPERATURE: 98 F | DIASTOLIC BLOOD PRESSURE: 72 MMHG | RESPIRATION RATE: 16 BRPM

## 2018-08-13 LAB
HCT VFR BLD CALC: 26.1 %
HGB BLD-MCNC: 9 G/DL
MCHC RBC-ENTMCNC: 30.9 PG
MCHC RBC-ENTMCNC: 34.5 G/DL
MCV RBC AUTO: 89.7 FL
PLATELET # BLD AUTO: 248 K/UL
PMV BLD: 10.1 FL
RBC # BLD: 2.91 M/UL
RBC # FLD: 17 %
WBC # FLD AUTO: 12.17 K/UL

## 2018-08-13 RX ORDER — OXYCODONE HYDROCHLORIDE 5 MG/1
5 CAPSULE ORAL
Qty: 180 | Refills: 0 | Status: ACTIVE | COMMUNITY
Start: 2018-08-13 | End: 1900-01-01

## 2018-08-14 ENCOUNTER — APPOINTMENT (OUTPATIENT)
Dept: INFUSION THERAPY | Facility: CLINIC | Age: 73
End: 2018-08-14

## 2018-08-14 RX ORDER — DEXAMETHASONE 0.5 MG/5ML
8 ELIXIR ORAL ONCE
Qty: 0 | Refills: 0 | Status: COMPLETED | OUTPATIENT
Start: 2018-08-14 | End: 2018-08-14

## 2018-08-14 RX ORDER — TOPOTECAN 4 MG/4ML
1.9 INJECTION, POWDER, LYOPHILIZED, FOR SOLUTION INTRAVENOUS ONCE
Qty: 0 | Refills: 0 | Status: COMPLETED | OUTPATIENT
Start: 2018-08-14 | End: 2018-08-14

## 2018-08-14 RX ADMIN — Medication 176.4 MILLIGRAM(S): at 11:20

## 2018-08-14 RX ADMIN — TOPOTECAN 203.8 MILLIGRAM(S): 4 INJECTION, POWDER, LYOPHILIZED, FOR SOLUTION INTRAVENOUS at 11:22

## 2018-08-15 ENCOUNTER — APPOINTMENT (OUTPATIENT)
Dept: INFUSION THERAPY | Facility: CLINIC | Age: 73
End: 2018-08-15

## 2018-08-15 RX ORDER — DEXAMETHASONE 0.5 MG/5ML
8 ELIXIR ORAL ONCE
Qty: 0 | Refills: 0 | Status: COMPLETED | OUTPATIENT
Start: 2018-08-15 | End: 2018-08-15

## 2018-08-15 RX ORDER — TOPOTECAN 4 MG/4ML
1.9 INJECTION, POWDER, LYOPHILIZED, FOR SOLUTION INTRAVENOUS ONCE
Qty: 0 | Refills: 0 | Status: COMPLETED | OUTPATIENT
Start: 2018-08-15 | End: 2018-08-15

## 2018-08-15 RX ADMIN — Medication 176.4 MILLIGRAM(S): at 11:03

## 2018-08-15 RX ADMIN — TOPOTECAN 203.8 MILLIGRAM(S): 4 INJECTION, POWDER, LYOPHILIZED, FOR SOLUTION INTRAVENOUS at 11:03

## 2018-08-16 ENCOUNTER — APPOINTMENT (OUTPATIENT)
Dept: INFUSION THERAPY | Facility: CLINIC | Age: 73
End: 2018-08-16

## 2018-08-16 RX ORDER — DEXAMETHASONE 0.5 MG/5ML
8 ELIXIR ORAL ONCE
Qty: 0 | Refills: 0 | Status: COMPLETED | OUTPATIENT
Start: 2018-08-16 | End: 2018-08-16

## 2018-08-16 RX ORDER — TOPOTECAN 4 MG/4ML
1.9 INJECTION, POWDER, LYOPHILIZED, FOR SOLUTION INTRAVENOUS ONCE
Qty: 0 | Refills: 0 | Status: COMPLETED | OUTPATIENT
Start: 2018-08-16 | End: 2018-08-16

## 2018-08-16 RX ADMIN — Medication 176.4 MILLIGRAM(S): at 10:03

## 2018-08-16 RX ADMIN — TOPOTECAN 203.8 MILLIGRAM(S): 4 INJECTION, POWDER, LYOPHILIZED, FOR SOLUTION INTRAVENOUS at 10:03

## 2018-08-17 ENCOUNTER — APPOINTMENT (OUTPATIENT)
Dept: INFUSION THERAPY | Facility: CLINIC | Age: 73
End: 2018-08-17

## 2018-08-17 RX ORDER — PEGFILGRASTIM-CBQV 6 MG/.6ML
6 INJECTION, SOLUTION SUBCUTANEOUS ONCE
Qty: 0 | Refills: 0 | Status: COMPLETED | OUTPATIENT
Start: 2018-08-17 | End: 2018-08-17

## 2018-08-17 RX ORDER — DEXAMETHASONE 0.5 MG/5ML
8 ELIXIR ORAL ONCE
Qty: 0 | Refills: 0 | Status: COMPLETED | OUTPATIENT
Start: 2018-08-17 | End: 2018-08-17

## 2018-08-17 RX ORDER — TOPOTECAN 4 MG/4ML
1.9 INJECTION, POWDER, LYOPHILIZED, FOR SOLUTION INTRAVENOUS ONCE
Qty: 0 | Refills: 0 | Status: COMPLETED | OUTPATIENT
Start: 2018-08-17 | End: 2018-08-17

## 2018-08-17 RX ADMIN — PEGFILGRASTIM-CBQV 6 MILLIGRAM(S): 6 INJECTION, SOLUTION SUBCUTANEOUS at 12:09

## 2018-08-17 RX ADMIN — TOPOTECAN 203.8 MILLIGRAM(S): 4 INJECTION, POWDER, LYOPHILIZED, FOR SOLUTION INTRAVENOUS at 10:58

## 2018-08-17 RX ADMIN — Medication 176.4 MILLIGRAM(S): at 10:51

## 2018-08-21 ENCOUNTER — APPOINTMENT (OUTPATIENT)
Dept: HEMATOLOGY ONCOLOGY | Facility: CLINIC | Age: 73
End: 2018-08-21

## 2018-08-21 ENCOUNTER — APPOINTMENT (OUTPATIENT)
Dept: INFUSION THERAPY | Facility: CLINIC | Age: 73
End: 2018-08-21

## 2018-08-21 ENCOUNTER — LABORATORY RESULT (OUTPATIENT)
Age: 73
End: 2018-08-21

## 2018-08-22 ENCOUNTER — APPOINTMENT (OUTPATIENT)
Dept: INFUSION THERAPY | Facility: CLINIC | Age: 73
End: 2018-08-22

## 2018-08-23 ENCOUNTER — APPOINTMENT (OUTPATIENT)
Dept: INFUSION THERAPY | Facility: CLINIC | Age: 73
End: 2018-08-23

## 2018-08-30 ENCOUNTER — APPOINTMENT (OUTPATIENT)
Dept: HEMATOLOGY ONCOLOGY | Facility: CLINIC | Age: 73
End: 2018-08-30

## 2018-08-30 ENCOUNTER — LABORATORY RESULT (OUTPATIENT)
Age: 73
End: 2018-08-30

## 2018-08-30 VITALS
HEART RATE: 63 BPM | BODY MASS INDEX: 25.33 KG/M2 | DIASTOLIC BLOOD PRESSURE: 72 MMHG | SYSTOLIC BLOOD PRESSURE: 168 MMHG | TEMPERATURE: 97.4 F | RESPIRATION RATE: 16 BRPM | WEIGHT: 171 LBS | HEIGHT: 69 IN

## 2018-08-30 DIAGNOSIS — R05 COUGH: ICD-10-CM

## 2018-08-30 DIAGNOSIS — C7A.8 OTHER MALIGNANT NEUROENDOCRINE TUMORS: ICD-10-CM

## 2018-08-30 DIAGNOSIS — R10.9 UNSPECIFIED ABDOMINAL PAIN: ICD-10-CM

## 2018-08-30 DIAGNOSIS — Z79.899 OTHER LONG TERM (CURRENT) DRUG THERAPY: ICD-10-CM

## 2018-08-30 DIAGNOSIS — Z51.11 ENCOUNTER FOR ANTINEOPLASTIC CHEMOTHERAPY: ICD-10-CM

## 2018-08-30 LAB
ABO + RH PNL BLD: NORMAL
BLD GP AB SCN SERPL QL: NORMAL
HCT VFR BLD CALC: 31.2 %
HGB BLD-MCNC: 10.1 G/DL
MCHC RBC-ENTMCNC: 29.5 PG
MCHC RBC-ENTMCNC: 32.4 G/DL
MCV RBC AUTO: 91.2 FL
PLATELET # BLD AUTO: 111 K/UL
PMV BLD: 10.4 FL
RBC # BLD: 3.42 M/UL
RBC # FLD: 17.8 %
WBC # FLD AUTO: 13.99 K/UL

## 2018-08-31 PROBLEM — Z79.899 ON ANTINEOPLASTIC CHEMOTHERAPY: Status: ACTIVE | Noted: 2018-07-11

## 2018-08-31 PROBLEM — C7A.8 LARGE CELL NEUROENDOCRINE CARCINOMA: Status: ACTIVE | Noted: 2017-08-14

## 2018-08-31 PROBLEM — Z51.11 ENCOUNTER FOR ANTINEOPLASTIC CHEMOTHERAPY: Status: ACTIVE | Noted: 2017-10-03

## 2018-08-31 PROBLEM — R10.9 ABDOMINAL PAIN: Status: ACTIVE | Noted: 2018-08-15

## 2018-09-01 LAB
ANION GAP SERPL CALC-SCNC: 12 MMOL/L
BUN SERPL-MCNC: 22 MG/DL
CALCIUM SERPL-MCNC: 9.2 MG/DL
CHLORIDE SERPL-SCNC: 108 MMOL/L
CO2 SERPL-SCNC: 21 MMOL/L
CREAT SERPL-MCNC: 1.7 MG/DL
GLUCOSE SERPL-MCNC: 118 MG/DL
POTASSIUM SERPL-SCNC: 5 MMOL/L
SODIUM SERPL-SCNC: 141 MMOL/L

## 2018-09-04 ENCOUNTER — APPOINTMENT (OUTPATIENT)
Dept: HEMATOLOGY ONCOLOGY | Facility: CLINIC | Age: 73
End: 2018-09-04

## 2018-09-04 ENCOUNTER — APPOINTMENT (OUTPATIENT)
Dept: INFUSION THERAPY | Facility: CLINIC | Age: 73
End: 2018-09-04

## 2018-09-04 ENCOUNTER — LABORATORY RESULT (OUTPATIENT)
Age: 73
End: 2018-09-04

## 2018-09-04 LAB
ALBUMIN SERPL ELPH-MCNC: 4 G/DL
ALP BLD-CCNC: 223 U/L
ALT SERPL-CCNC: 23 U/L
ANION GAP SERPL CALC-SCNC: 16 MMOL/L
AST SERPL-CCNC: 29 U/L
BILIRUB SERPL-MCNC: 0.3 MG/DL
BUN SERPL-MCNC: 25 MG/DL
CALCIUM SERPL-MCNC: 9.1 MG/DL
CHLORIDE SERPL-SCNC: 102 MMOL/L
CO2 SERPL-SCNC: 22 MMOL/L
CREAT SERPL-MCNC: 1.6 MG/DL
GLUCOSE SERPL-MCNC: 92 MG/DL
HCT VFR BLD CALC: 29 %
HGB BLD-MCNC: 9.8 G/DL
MAGNESIUM SERPL-MCNC: 2.2 MG/DL
MCHC RBC-ENTMCNC: 29.1 PG
MCHC RBC-ENTMCNC: 33.8 G/DL
MCV RBC AUTO: 86.1 FL
PLATELET # BLD AUTO: 340 K/UL
PMV BLD: 10.2 FL
POTASSIUM SERPL-SCNC: 4.7 MMOL/L
PROT SERPL-MCNC: 6.9 G/DL
RBC # BLD: 3.37 M/UL
RBC # FLD: 17.2 %
SODIUM SERPL-SCNC: 140 MMOL/L
WBC # FLD AUTO: 15.01 K/UL

## 2018-09-04 RX ORDER — DEXAMETHASONE 0.5 MG/5ML
8 ELIXIR ORAL ONCE
Qty: 0 | Refills: 0 | Status: COMPLETED | OUTPATIENT
Start: 2018-09-04 | End: 2018-09-04

## 2018-09-04 RX ORDER — TOPOTECAN 4 MG/4ML
1.9 INJECTION, POWDER, LYOPHILIZED, FOR SOLUTION INTRAVENOUS ONCE
Qty: 0 | Refills: 0 | Status: COMPLETED | OUTPATIENT
Start: 2018-09-04 | End: 2018-09-04

## 2018-09-04 RX ADMIN — TOPOTECAN 203.8 MILLIGRAM(S): 4 INJECTION, POWDER, LYOPHILIZED, FOR SOLUTION INTRAVENOUS at 11:07

## 2018-09-04 RX ADMIN — Medication 176.4 MILLIGRAM(S): at 10:53

## 2018-09-05 ENCOUNTER — APPOINTMENT (OUTPATIENT)
Dept: INFUSION THERAPY | Facility: CLINIC | Age: 73
End: 2018-09-05

## 2018-09-05 LAB — CANCER AG19-9 SERPL-ACNC: 105.5 U/ML

## 2018-09-05 RX ORDER — TOPOTECAN 4 MG/4ML
1.9 INJECTION, POWDER, LYOPHILIZED, FOR SOLUTION INTRAVENOUS ONCE
Qty: 0 | Refills: 0 | Status: COMPLETED | OUTPATIENT
Start: 2018-09-05 | End: 2018-09-05

## 2018-09-05 RX ORDER — DEXAMETHASONE 0.5 MG/5ML
8 ELIXIR ORAL ONCE
Qty: 0 | Refills: 0 | Status: COMPLETED | OUTPATIENT
Start: 2018-09-05 | End: 2018-09-05

## 2018-09-05 RX ADMIN — Medication 117.6 MILLIGRAM(S): at 13:21

## 2018-09-05 RX ADMIN — TOPOTECAN 203.8 MILLIGRAM(S): 4 INJECTION, POWDER, LYOPHILIZED, FOR SOLUTION INTRAVENOUS at 13:21

## 2018-09-06 ENCOUNTER — APPOINTMENT (OUTPATIENT)
Dept: INFUSION THERAPY | Facility: CLINIC | Age: 73
End: 2018-09-06

## 2018-09-06 RX ORDER — DEXAMETHASONE 0.5 MG/5ML
8 ELIXIR ORAL ONCE
Qty: 0 | Refills: 0 | Status: COMPLETED | OUTPATIENT
Start: 2018-09-06 | End: 2018-09-06

## 2018-09-06 RX ORDER — TOPOTECAN 4 MG/4ML
1.9 INJECTION, POWDER, LYOPHILIZED, FOR SOLUTION INTRAVENOUS ONCE
Qty: 0 | Refills: 0 | Status: COMPLETED | OUTPATIENT
Start: 2018-09-06 | End: 2018-09-06

## 2018-09-06 RX ADMIN — TOPOTECAN 203.8 MILLIGRAM(S): 4 INJECTION, POWDER, LYOPHILIZED, FOR SOLUTION INTRAVENOUS at 10:50

## 2018-09-06 RX ADMIN — Medication 176.4 MILLIGRAM(S): at 10:50

## 2018-09-07 ENCOUNTER — APPOINTMENT (OUTPATIENT)
Dept: INFUSION THERAPY | Facility: CLINIC | Age: 73
End: 2018-09-07

## 2018-09-07 VITALS
RESPIRATION RATE: 18 BRPM | DIASTOLIC BLOOD PRESSURE: 64 MMHG | HEART RATE: 62 BPM | TEMPERATURE: 98.2 F | SYSTOLIC BLOOD PRESSURE: 131 MMHG

## 2018-09-07 RX ORDER — PEGFILGRASTIM-CBQV 6 MG/.6ML
6 INJECTION, SOLUTION SUBCUTANEOUS ONCE
Qty: 0 | Refills: 0 | Status: COMPLETED | OUTPATIENT
Start: 2018-09-07 | End: 2018-09-07

## 2018-09-07 RX ORDER — TOPOTECAN 4 MG/4ML
1.9 INJECTION, POWDER, LYOPHILIZED, FOR SOLUTION INTRAVENOUS ONCE
Qty: 0 | Refills: 0 | Status: COMPLETED | OUTPATIENT
Start: 2018-09-07 | End: 2018-09-07

## 2018-09-07 RX ORDER — DEXAMETHASONE 0.5 MG/5ML
8 ELIXIR ORAL ONCE
Qty: 0 | Refills: 0 | Status: COMPLETED | OUTPATIENT
Start: 2018-09-07 | End: 2018-09-07

## 2018-09-07 RX ADMIN — Medication 176.4 MILLIGRAM(S): at 10:47

## 2018-09-07 RX ADMIN — TOPOTECAN 203.8 MILLIGRAM(S): 4 INJECTION, POWDER, LYOPHILIZED, FOR SOLUTION INTRAVENOUS at 10:47

## 2018-09-07 RX ADMIN — PEGFILGRASTIM-CBQV 6 MILLIGRAM(S): 6 INJECTION, SOLUTION SUBCUTANEOUS at 12:23

## 2018-09-11 ENCOUNTER — APPOINTMENT (OUTPATIENT)
Dept: HEMATOLOGY ONCOLOGY | Facility: CLINIC | Age: 73
End: 2018-09-11

## 2018-09-11 ENCOUNTER — LABORATORY RESULT (OUTPATIENT)
Age: 73
End: 2018-09-11

## 2018-09-12 LAB
HCT VFR BLD CALC: 23.5 %
HGB BLD-MCNC: 8 G/DL
MCHC RBC-ENTMCNC: 29.2 PG
MCHC RBC-ENTMCNC: 34 G/DL
MCV RBC AUTO: 85.8 FL
PLATELET # BLD AUTO: 207 K/UL
PMV BLD: 10.8 FL
RBC # BLD: 2.74 M/UL
RBC # FLD: 17.2 %
WBC # FLD AUTO: 11.85 K/UL

## 2018-09-18 ENCOUNTER — APPOINTMENT (OUTPATIENT)
Dept: HEMATOLOGY ONCOLOGY | Facility: CLINIC | Age: 73
End: 2018-09-18

## 2018-09-18 ENCOUNTER — OUTPATIENT (OUTPATIENT)
Dept: OUTPATIENT SERVICES | Facility: HOSPITAL | Age: 73
LOS: 1 days | Discharge: HOME | End: 2018-09-18

## 2018-09-18 ENCOUNTER — LABORATORY RESULT (OUTPATIENT)
Age: 73
End: 2018-09-18

## 2018-09-18 DIAGNOSIS — C78.7 SECONDARY MALIGNANT NEOPLASM OF LIVER AND INTRAHEPATIC BILE DUCT: ICD-10-CM

## 2018-09-18 DIAGNOSIS — C7A.8 OTHER MALIGNANT NEUROENDOCRINE TUMORS: ICD-10-CM

## 2018-09-18 DIAGNOSIS — Z00.00 ENCOUNTER FOR GENERAL ADULT MEDICAL EXAMINATION W/OUT ABNORMAL FINDINGS: ICD-10-CM

## 2018-09-18 DIAGNOSIS — Z51.11 ENCOUNTER FOR ANTINEOPLASTIC CHEMOTHERAPY: ICD-10-CM

## 2018-09-18 LAB
HCT VFR BLD CALC: 23.5 %
HGB BLD-MCNC: 7.9 G/DL
MCHC RBC-ENTMCNC: 28.4 PG
MCHC RBC-ENTMCNC: 33.6 G/DL
MCV RBC AUTO: 84.5 FL
PLATELET # BLD AUTO: 86 K/UL
PMV BLD: 11.2 FL
RBC # BLD: 2.78 M/UL
RBC # FLD: 18 %
WBC # FLD AUTO: 13.85 K/UL

## 2018-09-25 ENCOUNTER — APPOINTMENT (OUTPATIENT)
Dept: HEMATOLOGY ONCOLOGY | Facility: CLINIC | Age: 73
End: 2018-09-25

## 2018-09-25 ENCOUNTER — APPOINTMENT (OUTPATIENT)
Dept: INFUSION THERAPY | Facility: CLINIC | Age: 73
End: 2018-09-25

## 2018-09-26 ENCOUNTER — APPOINTMENT (OUTPATIENT)
Dept: INFUSION THERAPY | Facility: CLINIC | Age: 73
End: 2018-09-26

## 2018-09-26 ENCOUNTER — INPATIENT (INPATIENT)
Facility: HOSPITAL | Age: 73
LOS: 2 days | Discharge: HOME | End: 2018-09-29
Attending: INTERNAL MEDICINE | Admitting: INTERNAL MEDICINE

## 2018-09-26 VITALS
TEMPERATURE: 99 F | DIASTOLIC BLOOD PRESSURE: 77 MMHG | OXYGEN SATURATION: 98 % | SYSTOLIC BLOOD PRESSURE: 167 MMHG | HEART RATE: 96 BPM | RESPIRATION RATE: 19 BRPM

## 2018-09-26 LAB
ALBUMIN SERPL ELPH-MCNC: 3.4 G/DL — LOW (ref 3.5–5.2)
ALP SERPL-CCNC: 748 U/L — HIGH (ref 30–115)
ALT FLD-CCNC: 68 U/L — HIGH (ref 0–41)
ANION GAP SERPL CALC-SCNC: 20 MMOL/L — HIGH (ref 7–14)
APPEARANCE UR: ABNORMAL
AST SERPL-CCNC: 133 U/L — HIGH (ref 0–41)
BASOPHILS # BLD AUTO: 0 K/UL — SIGNIFICANT CHANGE UP (ref 0–0.2)
BASOPHILS NFR BLD AUTO: 0 % — SIGNIFICANT CHANGE UP (ref 0–1)
BILIRUB DIRECT SERPL-MCNC: 0.3 MG/DL — HIGH (ref 0–0.2)
BILIRUB INDIRECT FLD-MCNC: 0.3 MG/DL — SIGNIFICANT CHANGE UP (ref 0.2–1.2)
BILIRUB SERPL-MCNC: 0.6 MG/DL — SIGNIFICANT CHANGE UP (ref 0.2–1.2)
BILIRUB UR-MCNC: NEGATIVE — SIGNIFICANT CHANGE UP
BUN SERPL-MCNC: 32 MG/DL — HIGH (ref 10–20)
CALCIUM SERPL-MCNC: 8.5 MG/DL — SIGNIFICANT CHANGE UP (ref 8.5–10.1)
CHLORIDE SERPL-SCNC: 100 MMOL/L — SIGNIFICANT CHANGE UP (ref 98–110)
CO2 SERPL-SCNC: 19 MMOL/L — SIGNIFICANT CHANGE UP (ref 17–32)
COLOR SPEC: YELLOW — SIGNIFICANT CHANGE UP
CREAT SERPL-MCNC: 2.1 MG/DL — HIGH (ref 0.7–1.5)
DIFF PNL FLD: NEGATIVE — SIGNIFICANT CHANGE UP
EOSINOPHIL # BLD AUTO: 0 K/UL — SIGNIFICANT CHANGE UP (ref 0–0.7)
EOSINOPHIL NFR BLD AUTO: 0 % — SIGNIFICANT CHANGE UP (ref 0–8)
EPI CELLS # UR: ABNORMAL /HPF
GLUCOSE SERPL-MCNC: 106 MG/DL — HIGH (ref 70–99)
GLUCOSE UR QL: NEGATIVE MG/DL — SIGNIFICANT CHANGE UP
HCT VFR BLD CALC: 21.8 % — LOW (ref 42–52)
HGB BLD-MCNC: 7.5 G/DL — LOW (ref 14–18)
KETONES UR-MCNC: NEGATIVE — SIGNIFICANT CHANGE UP
LACTATE SERPL-SCNC: 1.1 MMOL/L — SIGNIFICANT CHANGE UP (ref 0.5–2.2)
LEUKOCYTE ESTERASE UR-ACNC: NEGATIVE — SIGNIFICANT CHANGE UP
LYMPHOCYTES # BLD AUTO: 13 % — LOW (ref 20.5–51.1)
LYMPHOCYTES # BLD AUTO: 2.86 K/UL — SIGNIFICANT CHANGE UP (ref 1.2–3.4)
MCHC RBC-ENTMCNC: 27.8 PG — SIGNIFICANT CHANGE UP (ref 27–31)
MCHC RBC-ENTMCNC: 34.4 G/DL — SIGNIFICANT CHANGE UP (ref 32–37)
MCV RBC AUTO: 80.7 FL — SIGNIFICANT CHANGE UP (ref 80–94)
MONOCYTES # BLD AUTO: 2.2 K/UL — HIGH (ref 0.1–0.6)
MONOCYTES NFR BLD AUTO: 10 % — HIGH (ref 1.7–9.3)
NEUTROPHILS # BLD AUTO: 16.95 K/UL — HIGH (ref 1.4–6.5)
NEUTROPHILS NFR BLD AUTO: 77 % — HIGH (ref 42.2–75.2)
NITRITE UR-MCNC: NEGATIVE — SIGNIFICANT CHANGE UP
NRBC # BLD: 0 /100 — SIGNIFICANT CHANGE UP (ref 0–0)
NRBC # BLD: SIGNIFICANT CHANGE UP /100 WBCS (ref 0–0)
PH UR: 6 — SIGNIFICANT CHANGE UP (ref 5–8)
PLAT MORPH BLD: NORMAL — SIGNIFICANT CHANGE UP
PLATELET # BLD AUTO: 466 K/UL — HIGH (ref 130–400)
POTASSIUM SERPL-MCNC: 4.9 MMOL/L — SIGNIFICANT CHANGE UP (ref 3.5–5)
POTASSIUM SERPL-SCNC: 4.9 MMOL/L — SIGNIFICANT CHANGE UP (ref 3.5–5)
PROT SERPL-MCNC: 6.3 G/DL — SIGNIFICANT CHANGE UP (ref 6–8)
PROT UR-MCNC: 30 MG/DL
RBC # BLD: 2.7 M/UL — LOW (ref 4.7–6.1)
RBC # FLD: 18 % — HIGH (ref 11.5–14.5)
RBC BLD AUTO: NORMAL — SIGNIFICANT CHANGE UP
SODIUM SERPL-SCNC: 139 MMOL/L — SIGNIFICANT CHANGE UP (ref 135–146)
SP GR SPEC: 1.01 — SIGNIFICANT CHANGE UP (ref 1.01–1.03)
TROPONIN T SERPL-MCNC: <0.01 NG/ML — SIGNIFICANT CHANGE UP
UROBILINOGEN FLD QL: 0.2 MG/DL — SIGNIFICANT CHANGE UP (ref 0.2–0.2)
WBC # BLD: 22.01 K/UL — HIGH (ref 4.8–10.8)
WBC # FLD AUTO: 22.01 K/UL — HIGH (ref 4.8–10.8)

## 2018-09-26 RX ORDER — SODIUM CHLORIDE 9 MG/ML
1000 INJECTION INTRAMUSCULAR; INTRAVENOUS; SUBCUTANEOUS ONCE
Qty: 0 | Refills: 0 | Status: COMPLETED | OUTPATIENT
Start: 2018-09-26 | End: 2018-09-26

## 2018-09-26 RX ADMIN — SODIUM CHLORIDE 1000 MILLILITER(S): 9 INJECTION INTRAMUSCULAR; INTRAVENOUS; SUBCUTANEOUS at 22:15

## 2018-09-26 NOTE — ED PROVIDER NOTE - OBJECTIVE STATEMENT
72 y/o male with hx HTN, BPH, PTSD, Bladder Ca under treatment with Dr Hanson at [facility] (last chemotherapy a couple weeks ago) presents to the ED c/o " I have no energy and decreased appetite. I was sweating at home and my head was hot so I checked my temperature. My temp was 100.1." +cough Patient c/o constipation due to pain medication. Took laxatives today and had bowel movement.

## 2018-09-26 NOTE — ED PROVIDER NOTE - PROGRESS NOTE DETAILS
Sepsis suspected at this time.   IVF bolus cannot be given due to: ( ) CHF ( ) CRF ( ) Hospice or comfort measures only ( ) Patient refusal I discussed with surgical resident Dr. Watts, will come and evaluate pt. Discussed with Dr. Perales, [facility] fellow (pager [phone]). Pt to be admitted to Dr. French's service. Requests ID consult. Signed out to Dr. Matias d/w Dr. Cisse, surg resident - CT results are c/w metastatic CA. No surgical etiology apparent at this time. No c/s needed.  Will admit to Dr. French's service.

## 2018-09-26 NOTE — ED PROVIDER NOTE - CARE PLAN
Principal Discharge DX:	Elevated liver enzymes  Secondary Diagnosis:	Metastatic cancer Principal Discharge DX:	Fever  Secondary Diagnosis:	Metastatic cancer  Secondary Diagnosis:	Elevated liver enzymes

## 2018-09-26 NOTE — ED ADULT TRIAGE NOTE - CHIEF COMPLAINT QUOTE
pt states that he is a bladder ca pt and has had a fever at home. was sent in to check hgb and bloodwork

## 2018-09-26 NOTE — ED PROVIDER NOTE - MEDICAL DECISION MAKING DETAILS
Pt was signed out to me by Dr. Godinez at 0400.  All workup prior to this time was initiated by that provider.  72 y/o M with fever.  Pending CT results and surgical c/s.  Plan to review labs and imaging and reassess. Pt was signed out to me by Dr. Godinez at 0400.  All workup prior to this time was initiated by that provider.  74 y/o M with fever.  Pending CT results and surgical c/s.  Plan to review labs and imaging and reassess.  POST SIGNOUT CLINICAL COURSE:  No clear need for surgical c/s at this time. Needs further imaging/assessment of gb.  Covered with abx.  Pt well appearing.  + mets to liver which may be causing elevated liver enz.

## 2018-09-26 NOTE — ED PROVIDER NOTE - ATTENDING CONTRIBUTION TO CARE
I personally evaluated the patient. I reviewed the Resident’s or Physician Assistant’s note (as assigned above), and agree with the findings and plan except as documented in my note.  73 yr M with hx of HTN, CVA w/o known deficits, BPH, metastatic bladder ca and liver/gallbladder ca presents with complaints of worsening RUQ abd pain. Reports fever today. + hx of constipation but had a normal BM today. Also complains of generalized weakness. No CP, SOB. no urinary complaints. Pt is currently undergoing chemo with a last x about 2 weeks ago. Oncologist is Dr. French. VS reviewed, pt well appearing, NAD. Head ncat, neck supple, no JVD. normal s1s2 without any murmurs, Lungs CTAB with normal work of breathing. abd +BS, soft with mild distension, + ttp of the RUQ, no guarding or rebound, extremities wnl, neuro exam grossly normal. No acute skin rashes. Plan is labs, UA, imaging, abx, pain control and reassess.

## 2018-09-27 ENCOUNTER — APPOINTMENT (OUTPATIENT)
Dept: INFUSION THERAPY | Facility: CLINIC | Age: 73
End: 2018-09-27

## 2018-09-27 LAB
ALBUMIN SERPL ELPH-MCNC: 3.1 G/DL — LOW (ref 3.5–5.2)
ALP SERPL-CCNC: 747 U/L — HIGH (ref 30–115)
ALT FLD-CCNC: 58 U/L — HIGH (ref 0–41)
ANION GAP SERPL CALC-SCNC: 17 MMOL/L — HIGH (ref 7–14)
AST SERPL-CCNC: 124 U/L — HIGH (ref 0–41)
BASOPHILS # BLD AUTO: 0.06 K/UL — SIGNIFICANT CHANGE UP (ref 0–0.2)
BASOPHILS NFR BLD AUTO: 0.3 % — SIGNIFICANT CHANGE UP (ref 0–1)
BILIRUB DIRECT SERPL-MCNC: 0.5 MG/DL — HIGH (ref 0–0.2)
BILIRUB INDIRECT FLD-MCNC: 0.3 MG/DL — SIGNIFICANT CHANGE UP (ref 0.2–1.2)
BILIRUB SERPL-MCNC: 0.8 MG/DL — SIGNIFICANT CHANGE UP (ref 0.2–1.2)
BUN SERPL-MCNC: 28 MG/DL — HIGH (ref 10–20)
CALCIUM SERPL-MCNC: 8.4 MG/DL — LOW (ref 8.5–10.1)
CHLORIDE SERPL-SCNC: 103 MMOL/L — SIGNIFICANT CHANGE UP (ref 98–110)
CO2 SERPL-SCNC: 19 MMOL/L — SIGNIFICANT CHANGE UP (ref 17–32)
CREAT SERPL-MCNC: 1.9 MG/DL — HIGH (ref 0.7–1.5)
EOSINOPHIL # BLD AUTO: 0.01 K/UL — SIGNIFICANT CHANGE UP (ref 0–0.7)
EOSINOPHIL NFR BLD AUTO: 0 % — SIGNIFICANT CHANGE UP (ref 0–8)
GGT SERPL-CCNC: 943 U/L — HIGH (ref 1–40)
GLUCOSE SERPL-MCNC: 122 MG/DL — HIGH (ref 70–99)
HCT VFR BLD CALC: 22.2 % — LOW (ref 42–52)
HGB BLD-MCNC: 7.5 G/DL — LOW (ref 14–18)
IMM GRANULOCYTES NFR BLD AUTO: 1.1 % — HIGH (ref 0.1–0.3)
INR BLD: 1.35 RATIO — HIGH (ref 0.65–1.3)
LYMPHOCYTES # BLD AUTO: 0.85 K/UL — LOW (ref 1.2–3.4)
LYMPHOCYTES # BLD AUTO: 4 % — LOW (ref 20.5–51.1)
MCHC RBC-ENTMCNC: 27.7 PG — SIGNIFICANT CHANGE UP (ref 27–31)
MCHC RBC-ENTMCNC: 33.8 G/DL — SIGNIFICANT CHANGE UP (ref 32–37)
MCV RBC AUTO: 81.9 FL — SIGNIFICANT CHANGE UP (ref 80–94)
MONOCYTES # BLD AUTO: 3.46 K/UL — HIGH (ref 0.1–0.6)
MONOCYTES NFR BLD AUTO: 16.4 % — HIGH (ref 1.7–9.3)
NEUTROPHILS # BLD AUTO: 16.43 K/UL — HIGH (ref 1.4–6.5)
NEUTROPHILS NFR BLD AUTO: 78.2 % — HIGH (ref 42.2–75.2)
PLATELET # BLD AUTO: 449 K/UL — HIGH (ref 130–400)
POTASSIUM SERPL-MCNC: 4.8 MMOL/L — SIGNIFICANT CHANGE UP (ref 3.5–5)
POTASSIUM SERPL-SCNC: 4.8 MMOL/L — SIGNIFICANT CHANGE UP (ref 3.5–5)
PROT SERPL-MCNC: 6.3 G/DL — SIGNIFICANT CHANGE UP (ref 6–8)
PROTHROM AB SERPL-ACNC: 14.5 SEC — HIGH (ref 9.95–12.87)
RBC # BLD: 2.71 M/UL — LOW (ref 4.7–6.1)
RBC # FLD: 18.1 % — HIGH (ref 11.5–14.5)
SODIUM SERPL-SCNC: 139 MMOL/L — SIGNIFICANT CHANGE UP (ref 135–146)
WBC # BLD: 21.04 K/UL — HIGH (ref 4.8–10.8)
WBC # FLD AUTO: 21.04 K/UL — HIGH (ref 4.8–10.8)

## 2018-09-27 RX ORDER — LACTULOSE 10 G/15ML
10 SOLUTION ORAL THREE TIMES A DAY
Qty: 0 | Refills: 0 | Status: DISCONTINUED | OUTPATIENT
Start: 2018-09-27 | End: 2018-09-29

## 2018-09-27 RX ORDER — MEROPENEM 1 G/30ML
1000 INJECTION INTRAVENOUS EVERY 12 HOURS
Qty: 0 | Refills: 0 | Status: DISCONTINUED | OUTPATIENT
Start: 2018-09-28 | End: 2018-09-28

## 2018-09-27 RX ORDER — POLYETHYLENE GLYCOL 3350 17 G/17G
17 POWDER, FOR SOLUTION ORAL
Qty: 0 | Refills: 0 | Status: DISCONTINUED | OUTPATIENT
Start: 2018-09-27 | End: 2018-09-29

## 2018-09-27 RX ORDER — CEFEPIME 1 G/1
2000 INJECTION, POWDER, FOR SOLUTION INTRAMUSCULAR; INTRAVENOUS ONCE
Qty: 0 | Refills: 0 | Status: COMPLETED | OUTPATIENT
Start: 2018-09-27 | End: 2018-09-27

## 2018-09-27 RX ORDER — MEROPENEM 1 G/30ML
1000 INJECTION INTRAVENOUS ONCE
Qty: 0 | Refills: 0 | Status: COMPLETED | OUTPATIENT
Start: 2018-09-27 | End: 2018-09-27

## 2018-09-27 RX ORDER — FINASTERIDE 5 MG/1
5 TABLET, FILM COATED ORAL DAILY
Qty: 0 | Refills: 0 | Status: DISCONTINUED | OUTPATIENT
Start: 2018-09-27 | End: 2018-09-29

## 2018-09-27 RX ORDER — TAMSULOSIN HYDROCHLORIDE 0.4 MG/1
1 CAPSULE ORAL
Qty: 0 | Refills: 0 | COMMUNITY

## 2018-09-27 RX ORDER — SODIUM CHLORIDE 9 MG/ML
1000 INJECTION, SOLUTION INTRAVENOUS ONCE
Qty: 0 | Refills: 0 | Status: COMPLETED | OUTPATIENT
Start: 2018-09-27 | End: 2018-09-27

## 2018-09-27 RX ORDER — TRAZODONE HCL 50 MG
1 TABLET ORAL
Qty: 0 | Refills: 0 | COMMUNITY

## 2018-09-27 RX ORDER — MORPHINE SULFATE 50 MG/1
6 CAPSULE, EXTENDED RELEASE ORAL ONCE
Qty: 0 | Refills: 0 | Status: DISCONTINUED | OUTPATIENT
Start: 2018-09-27 | End: 2018-09-27

## 2018-09-27 RX ORDER — METOPROLOL TARTRATE 50 MG
1 TABLET ORAL
Qty: 0 | Refills: 0 | COMMUNITY

## 2018-09-27 RX ORDER — POLYETHYLENE GLYCOL 3350 17 G/17G
1 POWDER, FOR SOLUTION ORAL
Qty: 0 | Refills: 0 | COMMUNITY

## 2018-09-27 RX ORDER — SENNA PLUS 8.6 MG/1
1 TABLET ORAL AT BEDTIME
Qty: 0 | Refills: 0 | Status: DISCONTINUED | OUTPATIENT
Start: 2018-09-27 | End: 2018-09-29

## 2018-09-27 RX ORDER — DOCUSATE SODIUM 100 MG
100 CAPSULE ORAL THREE TIMES A DAY
Qty: 0 | Refills: 0 | Status: DISCONTINUED | OUTPATIENT
Start: 2018-09-27 | End: 2018-09-29

## 2018-09-27 RX ORDER — TAMSULOSIN HYDROCHLORIDE 0.4 MG/1
0.4 CAPSULE ORAL AT BEDTIME
Qty: 0 | Refills: 0 | Status: DISCONTINUED | OUTPATIENT
Start: 2018-09-27 | End: 2018-09-29

## 2018-09-27 RX ORDER — SENNA PLUS 8.6 MG/1
1 TABLET ORAL
Qty: 0 | Refills: 0 | COMMUNITY

## 2018-09-27 RX ORDER — NIFEDIPINE 30 MG
90 TABLET, EXTENDED RELEASE 24 HR ORAL
Qty: 0 | Refills: 0 | COMMUNITY

## 2018-09-27 RX ORDER — OXYCODONE HYDROCHLORIDE 5 MG/1
10 TABLET ORAL EVERY 6 HOURS
Qty: 0 | Refills: 0 | Status: DISCONTINUED | OUTPATIENT
Start: 2018-09-27 | End: 2018-09-29

## 2018-09-27 RX ORDER — DOCUSATE SODIUM 100 MG
1 CAPSULE ORAL
Qty: 0 | Refills: 0 | COMMUNITY

## 2018-09-27 RX ORDER — VANCOMYCIN HCL 1 G
1000 VIAL (EA) INTRAVENOUS ONCE
Qty: 0 | Refills: 0 | Status: COMPLETED | OUTPATIENT
Start: 2018-09-27 | End: 2018-09-27

## 2018-09-27 RX ORDER — ATORVASTATIN CALCIUM 80 MG/1
1 TABLET, FILM COATED ORAL
Qty: 0 | Refills: 0 | COMMUNITY

## 2018-09-27 RX ORDER — SODIUM CHLORIDE 9 MG/ML
1000 INJECTION INTRAMUSCULAR; INTRAVENOUS; SUBCUTANEOUS
Qty: 0 | Refills: 0 | Status: DISCONTINUED | OUTPATIENT
Start: 2018-09-27 | End: 2018-09-29

## 2018-09-27 RX ORDER — FINASTERIDE 5 MG/1
1 TABLET, FILM COATED ORAL
Qty: 0 | Refills: 0 | COMMUNITY

## 2018-09-27 RX ORDER — MEROPENEM 1 G/30ML
INJECTION INTRAVENOUS
Qty: 0 | Refills: 0 | Status: DISCONTINUED | OUTPATIENT
Start: 2018-09-27 | End: 2018-09-28

## 2018-09-27 RX ORDER — ASPIRIN/CALCIUM CARB/MAGNESIUM 324 MG
81 TABLET ORAL DAILY
Qty: 0 | Refills: 0 | Status: DISCONTINUED | OUTPATIENT
Start: 2018-09-27 | End: 2018-09-29

## 2018-09-27 RX ORDER — TRAZODONE HCL 50 MG
50 TABLET ORAL DAILY
Qty: 0 | Refills: 0 | Status: DISCONTINUED | OUTPATIENT
Start: 2018-09-27 | End: 2018-09-29

## 2018-09-27 RX ORDER — PANTOPRAZOLE SODIUM 20 MG/1
40 TABLET, DELAYED RELEASE ORAL
Qty: 0 | Refills: 0 | Status: DISCONTINUED | OUTPATIENT
Start: 2018-09-27 | End: 2018-09-29

## 2018-09-27 RX ORDER — HEPARIN SODIUM 5000 [USP'U]/ML
5000 INJECTION INTRAVENOUS; SUBCUTANEOUS EVERY 8 HOURS
Qty: 0 | Refills: 0 | Status: DISCONTINUED | OUTPATIENT
Start: 2018-09-27 | End: 2018-09-29

## 2018-09-27 RX ORDER — METOPROLOL TARTRATE 50 MG
50 TABLET ORAL DAILY
Qty: 0 | Refills: 0 | Status: DISCONTINUED | OUTPATIENT
Start: 2018-09-27 | End: 2018-09-29

## 2018-09-27 RX ORDER — ASPIRIN/CALCIUM CARB/MAGNESIUM 324 MG
1 TABLET ORAL
Qty: 0 | Refills: 0 | COMMUNITY

## 2018-09-27 RX ORDER — OXYCODONE HYDROCHLORIDE 5 MG/1
10 TABLET ORAL ONCE
Qty: 0 | Refills: 0 | Status: DISCONTINUED | OUTPATIENT
Start: 2018-09-27 | End: 2018-09-27

## 2018-09-27 RX ORDER — NIFEDIPINE 30 MG
90 TABLET, EXTENDED RELEASE 24 HR ORAL DAILY
Qty: 0 | Refills: 0 | Status: DISCONTINUED | OUTPATIENT
Start: 2018-09-27 | End: 2018-09-29

## 2018-09-27 RX ORDER — VANCOMYCIN HCL 1 G
1000 VIAL (EA) INTRAVENOUS DAILY
Qty: 0 | Refills: 0 | Status: DISCONTINUED | OUTPATIENT
Start: 2018-09-27 | End: 2018-09-28

## 2018-09-27 RX ORDER — OXYCODONE HYDROCHLORIDE 5 MG/1
1 TABLET ORAL
Qty: 0 | Refills: 0 | COMMUNITY

## 2018-09-27 RX ORDER — ATORVASTATIN CALCIUM 80 MG/1
10 TABLET, FILM COATED ORAL DAILY
Qty: 0 | Refills: 0 | Status: DISCONTINUED | OUTPATIENT
Start: 2018-09-27 | End: 2018-09-29

## 2018-09-27 RX ADMIN — CEFEPIME 100 MILLIGRAM(S): 1 INJECTION, POWDER, FOR SOLUTION INTRAMUSCULAR; INTRAVENOUS at 01:34

## 2018-09-27 RX ADMIN — HEPARIN SODIUM 5000 UNIT(S): 5000 INJECTION INTRAVENOUS; SUBCUTANEOUS at 14:56

## 2018-09-27 RX ADMIN — SODIUM CHLORIDE 1000 MILLILITER(S): 9 INJECTION, SOLUTION INTRAVENOUS at 03:10

## 2018-09-27 RX ADMIN — Medication 81 MILLIGRAM(S): at 14:54

## 2018-09-27 RX ADMIN — OXYCODONE HYDROCHLORIDE 10 MILLIGRAM(S): 5 TABLET ORAL at 09:44

## 2018-09-27 RX ADMIN — LACTULOSE 10 GRAM(S): 10 SOLUTION ORAL at 15:10

## 2018-09-27 RX ADMIN — SODIUM CHLORIDE 1000 MILLILITER(S): 9 INJECTION INTRAMUSCULAR; INTRAVENOUS; SUBCUTANEOUS at 02:18

## 2018-09-27 RX ADMIN — PANTOPRAZOLE SODIUM 40 MILLIGRAM(S): 20 TABLET, DELAYED RELEASE ORAL at 14:55

## 2018-09-27 RX ADMIN — Medication 250 MILLIGRAM(S): at 02:35

## 2018-09-27 RX ADMIN — MEROPENEM 100 MILLIGRAM(S): 1 INJECTION INTRAVENOUS at 14:56

## 2018-09-27 RX ADMIN — FINASTERIDE 5 MILLIGRAM(S): 5 TABLET, FILM COATED ORAL at 14:54

## 2018-09-27 RX ADMIN — SODIUM CHLORIDE 1000 MILLILITER(S): 9 INJECTION, SOLUTION INTRAVENOUS at 05:48

## 2018-09-27 RX ADMIN — TAMSULOSIN HYDROCHLORIDE 0.4 MILLIGRAM(S): 0.4 CAPSULE ORAL at 23:40

## 2018-09-27 RX ADMIN — ATORVASTATIN CALCIUM 10 MILLIGRAM(S): 80 TABLET, FILM COATED ORAL at 14:54

## 2018-09-27 RX ADMIN — SODIUM CHLORIDE 75 MILLILITER(S): 9 INJECTION INTRAMUSCULAR; INTRAVENOUS; SUBCUTANEOUS at 23:29

## 2018-09-27 RX ADMIN — Medication 100 MILLIGRAM(S): at 22:40

## 2018-09-27 RX ADMIN — OXYCODONE HYDROCHLORIDE 10 MILLIGRAM(S): 5 TABLET ORAL at 18:48

## 2018-09-27 RX ADMIN — MORPHINE SULFATE 6 MILLIGRAM(S): 50 CAPSULE, EXTENDED RELEASE ORAL at 03:52

## 2018-09-27 RX ADMIN — Medication 1000 MILLIGRAM(S): at 05:20

## 2018-09-27 RX ADMIN — Medication 50 MILLIGRAM(S): at 14:54

## 2018-09-27 RX ADMIN — SODIUM CHLORIDE 1000 MILLILITER(S): 9 INJECTION, SOLUTION INTRAVENOUS at 05:49

## 2018-09-27 RX ADMIN — MORPHINE SULFATE 6 MILLIGRAM(S): 50 CAPSULE, EXTENDED RELEASE ORAL at 01:41

## 2018-09-27 RX ADMIN — Medication 90 MILLIGRAM(S): at 14:55

## 2018-09-27 RX ADMIN — Medication 50 MILLIGRAM(S): at 23:30

## 2018-09-27 RX ADMIN — Medication 100 MILLIGRAM(S): at 14:55

## 2018-09-27 RX ADMIN — CEFEPIME 2000 MILLIGRAM(S): 1 INJECTION, POWDER, FOR SOLUTION INTRAMUSCULAR; INTRAVENOUS at 02:30

## 2018-09-27 NOTE — ED ADULT NURSE NOTE - NSIMPLEMENTINTERV_GEN_ALL_ED
Implemented All Fall with Harm Risk Interventions:  [place] to call system. Call bell, personal items and telephone within reach. Instruct patient to call for assistance. Room bathroom lighting operational. Non-slip footwear when patient is off stretcher. Physically safe environment: no spills, clutter or unnecessary equipment. Stretcher in lowest position, wheels locked, appropriate side rails in place. Provide visual cue, wrist band, yellow gown, etc. Monitor gait and stability. Monitor for mental status changes and reorient to person, place, and time. Review medications for side effects contributing to fall risk. Reinforce activity limits and safety measures with patient and family. Provide visual clues: red socks.

## 2018-09-27 NOTE — H&P ADULT - HISTORY OF PRESENT ILLNESS
74 y/o M with PMH of HTN, BPH, PTSD, DLD and metastatic neuroendocrine tumor of GB ( on chemo therapy, Topotecan) came with sweating , abdominal pain and generalized weakness.  As per patient, he took his temp at home and it was 100.1 . He has had chronic RUQ abdominal pain, sharp, radiating to back on oxycodone. He had generalized abdominal pain yesterday last for 2-3 hours. He also felt nauseous yesterday but denies vomiting.  Currently he denies any abdominal pain.  He also complains of constipation and hard stool. He had BM yesterday. He has been trying multiple laxative with minimal effect.  He denies any C.P, cough, chills, Headache, jaundice or dysuria.    Oncology history: He received 7 cycles of carboplatin and ectoposide in January 2018, then switched to FOLFIRI s/p 13 cycles in July 2018. He was recently switched to Topotecan in July 2018 and received 2 cycles, last one was end of August. he was supposed to receive 3rd cycle on Tuesday which postponed to next week because patient did not feel well. 72 y/o M with PMH of HTN, BPH, PTSD, DLD, CKD 3,  and metastatic neuroendocrine tumor of GB ( on chemo therapy, Topotecan) came with sweating , abdominal pain and generalized weakness.  As per patient, he took his temp at home and it was 100.1 . He has had chronic RUQ abdominal pain, sharp, radiating to back on oxycodone. He had generalized abdominal pain yesterday last for 2-3 hours. He also felt nauseous yesterday but denies vomiting.  Currently he denies any abdominal pain.  He also complains of constipation and hard stool. He had BM yesterday. He has been trying multiple laxative with minimal effect.  He denies any C.P, cough, chills, Headache, jaundice or dysuria.    Oncology history: He received 7 cycles of carboplatin and ectoposide in January 2018, then switched to FOLFIRI s/p 13 cycles in July 2018. He was recently switched to Topotecan in July 2018 and received 2 cycles, last one was end of August. He was supposed to receive 3rd cycle on Tuesday which postponed to next week because patient did not feel well. 74 y/o M with PMH of HTN, BPH, PTSD, DLD, CKD 3,  and metastatic neuroendocrine tumor of GB ( on chemo therapy, Topotecan) came with sweating , abdominal pain and generalized weakness.  As per patient, he took his temp at home and it was 100.1 . He has had chronic RUQ abdominal pain, sharp, radiating to back on oxycodone. He had generalized abdominal pain yesterday last for 2-3 hours. He also felt nauseous yesterday but denies vomiting.  Currently he denies any abdominal pain.  He also complains of constipation and hard stool. He had BM yesterday. He has been trying multiple laxative with minimal effect.  He denies any C.P, cough, chills, Headache, jaundice or dysuria.    Oncology history: He received 7 cycles of carboplatin and ectoposide in January 2018, then switched to FOLFIRI s/p 13 cycles in July 2018. He was recently switched to Topotecan in July 2018 and received 2 cycles, last one was end of August. He was supposed to receive 3rd cycle on Tuesday

## 2018-09-27 NOTE — PROGRESS NOTE ADULT - SUBJECTIVE AND OBJECTIVE BOX
Patient is a 73y old  Male who presents with a chief complaint of Weakness and sweating (27 Sep 2018 12:01)      Subjective:      Vital Signs Last 24 Hrs  T(C): 37.6 (27 Sep 2018 07:20), Max: 38 (27 Sep 2018 00:11)  T(F): 99.6 (27 Sep 2018 07:20), Max: 100.4 (27 Sep 2018 00:11)  HR: 80 (27 Sep 2018 07:20) (78 - 96)  BP: 143/73 (27 Sep 2018 07:20) (126/64 - 167/77)  BP(mean): --  RR: 18 (27 Sep 2018 07:20) (18 - 19)  SpO2: 99% (27 Sep 2018 00:11) (98% - 99%)    PHYSICAL EXAM  General: adult in NAD  HEENT: clear oropharynx, anicteric sclera, pink conjunctiva  Neck: supple  CV: normal S1/S2 with no murmur rubs or gallops  Lungs: positive air movement b/l ant lungs,clear to auscultation, no wheezes, no rales  Abdomen: soft non-tender non-distended, no hepatosplenomegaly  Ext: no clubbing cyanosis or edema  Skin: no rashes and no petechiae  Neuro: alert and oriented X 4, no focal deficits    MEDICATIONS  (STANDING):  aspirin  chewable 81 milliGRAM(s) Oral daily  atorvastatin Oral Tab/Cap - Peds 10 milliGRAM(s) Oral daily  docusate sodium 100 milliGRAM(s) Oral three times a day  metoprolol succinate ER 50 milliGRAM(s) Oral daily  NIFEdipine XL 90 milliGRAM(s) Oral daily  oxyCODONE    IR 10 milliGRAM(s) Oral every 6 hours  senna 8.6 milliGRAM(s) Oral Tablet - Peds 1 Tablet(s) Oral at bedtime    MEDICATIONS  (PRN):      LABS:                          7.5    22.01 )-----------( 466      ( 26 Sep 2018 20:45 )             21.8         Mean Cell Volume : 80.7 fL  Mean Cell Hemoglobin : 27.8 pg  Mean Cell Hemoglobin Concentration : 34.4 g/dL  Auto Neutrophil # : 16.95 K/uL  Auto Lymphocyte # : 2.86 K/uL  Auto Monocyte # : 2.20 K/uL  Auto Eosinophil # : 0.00 K/uL  Auto Basophil # : 0.00 K/uL  Auto Neutrophil % : 77.0 %  Auto Lymphocyte % : 13.0 %  Auto Monocyte % : 10.0 %  Auto Eosinophil % : 0.0 %  Auto Basophil % : 0.0 %      Serial CBC's   @ 20:45  Hct-21.8 / Hgb-7.5 / Plat-466 / RBC-2.70 / WBC-22.01          139  |  100  |  32<H>  ----------------------------<  106<H>  4.9   |  19  |  2.1<H>    Ca    8.5      26 Sep 2018 20:45    TPro  6.3  /  Alb  3.4<L>  /  TBili  0.6  /  DBili  0.3<H>  /  AST  133<H>  /  ALT  68<H>  /  AlkPhos  748<H>      Urinalysis Basic - ( 26 Sep 2018 20:45 )    Color: Yellow / Appearance: Cloudy / S.010 / pH: x  Gluc: x / Ketone: Negative  / Bili: Negative / Urobili: 0.2 mg/dL   Blood: x / Protein: 30 mg/dL / Nitrite: Negative   Leuk Esterase: Negative / RBC: x / WBC x   Sq Epi: x / Non Sq Epi: Occasional /HPF / Bacteria: x Patient is a 73y old  Male who presents with a chief complaint of Weakness and sweating (27 Sep 2018 12:01)      Subjective:      Vital Signs Last 24 Hrs  T(C): 37.6 (27 Sep 2018 07:20), Max: 38 (27 Sep 2018 00:11)  T(F): 99.6 (27 Sep 2018 07:20), Max: 100.4 (27 Sep 2018 00:11)  HR: 80 (27 Sep 2018 07:20) (78 - 96)  BP: 143/73 (27 Sep 2018 07:20) (126/64 - 167/77)  BP(mean): --  RR: 18 (27 Sep 2018 07:20) (18 - 19)  SpO2: 99% (27 Sep 2018 00:11) (98% - 99%)    PHYSICAL EXAM  General: adult in NAD  HEENT: clear oropharynx, anicteric sclera, pink conjunctiva  Neck: supple  CV: normal S1/S2 with no murmur rubs or gallops  Lungs: positive air movement b/l ant lungs,clear to auscultation, no wheezes, no rales  Abdomen: soft non-tender non-distended, no hepatosplenomegaly RUQ MASS  Ext: no clubbing cyanosis or edema  Skin: no rashes and no petechiae  Neuro: alert and oriented X 4, no focal deficits    MEDICATIONS  (STANDING):  aspirin  chewable 81 milliGRAM(s) Oral daily  atorvastatin Oral Tab/Cap - Peds 10 milliGRAM(s) Oral daily  docusate sodium 100 milliGRAM(s) Oral three times a day  metoprolol succinate ER 50 milliGRAM(s) Oral daily  NIFEdipine XL 90 milliGRAM(s) Oral daily  oxyCODONE    IR 10 milliGRAM(s) Oral every 6 hours  senna 8.6 milliGRAM(s) Oral Tablet - Peds 1 Tablet(s) Oral at bedtime    MEDICATIONS  (PRN):      LABS:                          7.5    22.01 )-----------( 466      ( 26 Sep 2018 20:45 )             21.8         Mean Cell Volume : 80.7 fL  Mean Cell Hemoglobin : 27.8 pg  Mean Cell Hemoglobin Concentration : 34.4 g/dL  Auto Neutrophil # : 16.95 K/uL  Auto Lymphocyte # : 2.86 K/uL  Auto Monocyte # : 2.20 K/uL  Auto Eosinophil # : 0.00 K/uL  Auto Basophil # : 0.00 K/uL  Auto Neutrophil % : 77.0 %  Auto Lymphocyte % : 13.0 %  Auto Monocyte % : 10.0 %  Auto Eosinophil % : 0.0 %  Auto Basophil % : 0.0 %      Serial CBC's   @ 20:45  Hct-21.8 / Hgb-7.5 / Plat-466 / RBC-2.70 / WBC-22.01          139  |  100  |  32<H>  ----------------------------<  106<H>  4.9   |  19  |  2.1<H>    Ca    8.5      26 Sep 2018 20:45    TPro  6.3  /  Alb  3.4<L>  /  TBili  0.6  /  DBili  0.3<H>  /  AST  133<H>  /  ALT  68<H>  /  AlkPhos  748<H>      Urinalysis Basic - ( 26 Sep 2018 20:45 )    Color: Yellow / Appearance: Cloudy / S.010 / pH: x  Gluc: x / Ketone: Negative  / Bili: Negative / Urobili: 0.2 mg/dL   Blood: x / Protein: 30 mg/dL / Nitrite: Negative   Leuk Esterase: Negative / RBC: x / WBC x   Sq Epi: x / Non Sq Epi: Occasional /HPF / Bacteria: x Patient is a 73y old  Male who presents with a chief complaint of Weakness and sweating (27 Sep 2018 12:01)    Subjective: Patient seen and examined.  Is complaining of constipation but feels okay otherwise.    Vital Signs Last 24 Hrs  T(C): 37.6 (27 Sep 2018 07:20), Max: 38 (27 Sep 2018 00:11)  T(F): 99.6 (27 Sep 2018 07:20), Max: 100.4 (27 Sep 2018 00:11)  HR: 80 (27 Sep 2018 07:20) (78 - 96)  BP: 143/73 (27 Sep 2018 07:20) (126/64 - 167/77)  BP(mean): --  RR: 18 (27 Sep 2018 07:20) (18 - 19)  SpO2: 99% (27 Sep 2018 00:11) (98% - 99%)    PHYSICAL EXAM  General: adult in NAD  HEENT: NC/AT  Neck: supple  CV: +S1, +S2  Lungs: positive air movement b/l ant lungs,c lear to auscultation, no wheezes, no rales  Abdomen: soft non-tender non-distended, RUQ MASS  Ext: no edema  Skin: no rashes and no petechiae, site of chemo port on left chest appears clean and dry  Neuro: alert and oriented X 4, no focal deficits    MEDICATIONS  (STANDING):  aspirin  chewable 81 milliGRAM(s) Oral daily  atorvastatin Oral Tab/Cap - Peds 10 milliGRAM(s) Oral daily  docusate sodium 100 milliGRAM(s) Oral three times a day  metoprolol succinate ER 50 milliGRAM(s) Oral daily  NIFEdipine XL 90 milliGRAM(s) Oral daily  oxyCODONE    IR 10 milliGRAM(s) Oral every 6 hours  senna 8.6 milliGRAM(s) Oral Tablet - Peds 1 Tablet(s) Oral at bedtime    MEDICATIONS  (PRN):      LABS:                          7.5    22.01 )-----------( 466      ( 26 Sep 2018 20:45 )             21.8         Mean Cell Volume : 80.7 fL  Mean Cell Hemoglobin : 27.8 pg  Mean Cell Hemoglobin Concentration : 34.4 g/dL  Auto Neutrophil # : 16.95 K/uL  Auto Lymphocyte # : 2.86 K/uL  Auto Monocyte # : 2.20 K/uL  Auto Eosinophil # : 0.00 K/uL  Auto Basophil # : 0.00 K/uL  Auto Neutrophil % : 77.0 %  Auto Lymphocyte % : 13.0 %  Auto Monocyte % : 10.0 %  Auto Eosinophil % : 0.0 %  Auto Basophil % : 0.0 %      Serial CBC's   @ 20:45  Hct-21.8 / Hgb-7.5 / Plat-466 / RBC-2.70 / WBC-22.01          139  |  100  |  32<H>  ----------------------------<  106<H>  4.9   |  19  |  2.1<H>    Ca    8.5      26 Sep 2018 20:45    TPro  6.3  /  Alb  3.4<L>  /  TBili  0.6  /  DBili  0.3<H>  /  AST  133<H>  /  ALT  68<H>  /  AlkPhos  748<H>      Urinalysis Basic - ( 26 Sep 2018 20:45 )    Color: Yellow / Appearance: Cloudy / S.010 / pH: x  Gluc: x / Ketone: Negative  / Bili: Negative / Urobili: 0.2 mg/dL   Blood: x / Protein: 30 mg/dL / Nitrite: Negative   Leuk Esterase: Negative / RBC: x / WBC x   Sq Epi: x / Non Sq Epi: Occasional /HPF / Bacteria: x

## 2018-09-27 NOTE — H&P ADULT - NSHPSOCIALHISTORY_GEN_ALL_CORE
stopped smoking 4 years ago, he was smoking for 40 years before ( 4 cig per day)  denies drinking alcohol  smoking marijuana

## 2018-09-27 NOTE — H&P ADULT - HISTORY OF PRESENT ILLNESS
In summary, this 73-year-old male with history of metastatic neuroendocrine tumor of the gallbladder. He has received 7 cycles of chemotherapy with carboplatin and etoposide. In January 2018, he was switched to FOLFIRI s/p 13 cycles and progressed in July 2018.  He was recently switched to Topotecan in July 2018.

## 2018-09-27 NOTE — H&P ADULT - NSHPPHYSICALEXAM_GEN_ALL_CORE
ICU Vital Signs Last 24 Hrs  T(C): 37.6 (27 Sep 2018 07:20), Max: 38 (27 Sep 2018 00:11)  T(F): 99.6 (27 Sep 2018 07:20), Max: 100.4 (27 Sep 2018 00:11)  HR: 80 (27 Sep 2018 07:20) (78 - 96)  BP: 143/73 (27 Sep 2018 07:20) (126/64 - 167/77)  RR: 18 (27 Sep 2018 07:20) (18 - 19)  SpO2: 99% (27 Sep 2018 00:11) (98% - 99%)  	  HEENT:   Normal oral mucosa, PERRL,  sclera mildly icteric	  Cardiovascular: Normal S1 S2, No JVD, No murmurs, No edema  Respiratory: Lungs clear to auscultation	  Psychiatry: A & O x 3, Mood & affect appropriate  Gastrointestinal:  Soft, Non-tender, + BS, palpable RUQ mass extending to midline and right flank. morphy sign was negative on examination	  Skin: No rashes, No ecchymoses, No cyanosis	  Neurologic: Non-focal, A&Ox3, nonfocal, VYAS x 4  Extremities: Normal range of motion, No clubbing, cyanosis or edema  Vascular: Peripheral pulses palpable 2+ bilaterally

## 2018-09-27 NOTE — PROGRESS NOTE ADULT - ASSESSMENT
74 y/o M with PMH of HTN, BPH, PTSD, DLD, CKD 3,  and metastatic neuroendocrine tumor of GB ( on chemo therapy, Topotecan) came with sweating , abdominal pain and generalized weakness.    1.  Fever with leukocytosis, initial workup not revealing.  -Patient is not neutropenic.  -Follow up cultures.  -Continue vancomycin and meropenem.  ID consultation pending.    2. Metastatic neuroendocrine cancer of GB:  -Due for topotecan.  Will address this once question of fevers and leukocytosis addressed.    3.  Elevated LFTs: secondary to GB cancer with liver mets  -Elevated alk phos but no bone mets in recent PET scan  -Check GGT and monitor LFTs  -No acute pathology on NC CT abdomen and abdominal ultrasound.  May consider contrast imaging    4. RANULFO on CKD (Creatinine 2.1 from baseline 1.6)  -Continue IV fluids for now.  -Repeat AM BMP    5. HTN  -C/w toprol + nifedipine    6. BPH  -C/w flomax + finasteride    7. PTSD  -C/w trazadone    8. Constipation:  -C/w miralax + senna + colace  -Lactulose added. 72 y/o M with PMH of HTN, BPH, PTSD, DLD, CKD 3,  and metastatic neuroendocrine tumor of GB ( on chemo therapy, Topotecan) came with sweating , abdominal pain and generalized weakness.    1.  Fever with leukocytosis, initial workup not revealing.  -Patient is not neutropenic.  -Follow up cultures.  -Continue vancomycin and meropenem.  ID consultation pending.    2. Metastatic neuroendocrine cancer of GB:  -Due for topotecan.  Will address this once question of fevers and leukocytosis addressed.    3.  Elevated LFTs: secondary to GB cancer with liver mets  -Elevated alk phos but no bone mets in recent PET scan  -Check GGT and monitor LFTs  -No acute pathology on NC CT abdomen and abdominal ultrasound.  May consider contrast imaging once RANULFO resolves.    4. RANULFO on CKD (Creatinine 2.1 from baseline 1.6)  -Continue IV fluids for now.  -Repeat AM BMP    5. HTN  -C/w toprol + nifedipine    6. BPH  -C/w flomax + finasteride    7. PTSD  -C/w trazadone    8. Constipation:  -C/w miralax + senna + colace  -Lactulose added. 74 y/o M with PMH of HTN, BPH, PTSD, DLD, CKD 3,  and metastatic neuroendocrine tumor of GB ( on chemo therapy, Topotecan) came with sweating , abdominal pain and generalized weakness.    1.  Fever with leukocytosis, initial workup not revealing.  -Patient is not neutropenic.  -Follow up cultures.  -Continue vancomycin and meropenem.  ID consultation pending.    2. Metastatic neuroendocrine cancer of GB:  -Due for topotecan next week.  Will address this once question of fevers and leukocytosis addressed.    3.  Elevated LFTs: secondary to GB cancer with liver mets  -Elevated alk phos but no bone mets in recent PET scan  -Check GGT and monitor LFTs  -No acute pathology on NC CT abdomen and abdominal ultrasound.  May consider contrast imaging once RANULFO resolves.    4. RANULFO on CKD (Creatinine 2.1 from baseline 1.6)  -Continue IV fluids for now.  -Repeat AM BMP    5. HTN  -C/w toprol + nifedipine    6. BPH  -C/w flomax + finasteride    7. PTSD  -C/w trazadone    8. Constipation:  -C/w miralax + senna + colace  -Lactulose added.

## 2018-09-27 NOTE — PATIENT PROFILE ADULT. - ABILITY TO HEAR (WITH HEARING AID OR HEARING APPLIANCE IF NORMALLY USED):
no hearing aids/Mildly to Moderately Impaired: difficulty hearing in some environments or speaker may need to increase volume or speak distinctly

## 2018-09-27 NOTE — PATIENT PROFILE ADULT. - VISION (WITH CORRECTIVE LENSES IF THE PATIENT USUALLY WEARS THEM):
Partially impaired: cannot see medication labels or newsprint, but can see obstacles in path, and the surrounding layout; can count fingers at arm's length/bi-focals

## 2018-09-27 NOTE — H&P ADULT - PMH
BPH (benign prostatic hyperplasia)    CKD (chronic kidney disease), stage III    Dyslipidemia    Hypertension    Neuroendocrine carcinoma metastatic to liver    PTSD (post-traumatic stress disorder)

## 2018-09-27 NOTE — H&P ADULT - ASSESSMENT
72 y/o M with PMH of HTN, BPH, PTSD, DLD, CKD 3,  and metastatic neuroendocrine tumor of GB ( on chemo therapy, Topotecan) came with sweating , abdominal pain and generalized weakness.      #Fever with leukocytosis, unknown origin in initial work up,  infectious process like billary system infection vs fever due to malignancy   -Patient on chemo, not neutropenic  -received IV abx in ER--. c/w vanco + meropenem  -ID eval as per oncology recommendation  -f/u Bcx       #Metastatic neuroendocrine cancer of GB:  -s/p 2 cycles of Topotecan  -f/u oncology recommendation    #Abnormal LFT : secondary to GB cancer with liver mets , superimposed infectious of billary system  -ALP was abnormal in previous lab ( No bone mets showed in PET scan)  -ER lab showed worsening ALP and elevated AST/ALT, Bilirubin unremarkable but sclera is mildly icteric on physical exam    CT w/o contrast ( no contrast due to RANULFO) and abdominal US did not show any billary system dilatation or cholecystitis , CBD : 6mm ( less likely cholangitis??)  -Check GGT and fractionated ALP  -Monitor LFT  -c/w IV abx  -if LFT or symptoms getting worse may consider MRCP and GI eval      #RANULFO on CKD ( basline cr:1.6)  -likely pre-renal  -c/w IV fluid  -monitor BMP and electrolyte      #HTN:  -c/w toprol + Nifedipine    # BPH:  -c/w Flomax + Finasteride    #PTSD:  -c/w Trazadone    #Constipation:  -c/w Miralax + senna + colace  -add lactulose      #GI and DVT PPX  Fom home  Full code  May consider palliative care eval to discuss goal of care

## 2018-09-27 NOTE — H&P ADULT - NSHPLABSRESULTS_GEN_ALL_CORE
7.5    . )-----------( 466      ( 26 Sep 2018 20:45 )             21.8           139  |  100  |  32<H>  ----------------------------<  106<H>  4.9   |  19  |  2.1<H>    Ca    8.5      26 Sep 2018 20:45    TPro  6.3  /  Alb  3.4<L>  /  TBili  0.6  /  DBili  0.3<H>  /  AST  133<H>  /  ALT  68<H>  /  AlkPhos  748<H>      Urinalysis Basic - ( 26 Sep 2018 20:45 )    Color: Yellow / Appearance: Cloudy / S.010 / pH: x  Gluc: x / Ketone: Negative  / Bili: Negative / Urobili: 0.2 mg/dL   Blood: x / Protein: 30 mg/dL / Nitrite: Negative   Leuk Esterase: Negative / RBC: x / WBC x   Sq Epi: x / Non Sq Epi: Occasional /HPF / Bacteria: x          < from: CT Abdomen and Pelvis No Cont (18 @ 03:42) >    HEPATOBILIARY: Multipleill-defined hypoattenuating hepatic lesions are   again identified and better evaluated on prior PET/CT consistent with   metastatic disease.       IMPRESSION:     Multiple ill-defined hypoattenuating hepatic lesions are again   identified, better evaluated on prior PET/CT dated 2018 consistent   with patient's known metastatic disease.    Multiple upper abdominal lymph nodes are again identified as with   metastatic disease.    Mild amount of abdominopelvic ascites.    No obstruction or free air noted.    < end of copied text >    < from: US Abdomen Limited (18 @ 03:12) >    FINDINGS:    LIVER:  Normal in contour and echogenicity measuring 20 cm in length.   Multiple known liver masses are not well visualized.     GALLBLADDER/BILIARY TREE: The gallbladder is not well-visualized.    Negative sonographic Lin's sign. No intrahepatic biliary ductal   dilatation. The common bile duct measures 6 mm, which is normal.     PANCREAS:  Visualized head and body are unremarkable. Remainder obscured   by overlyingbowel gas.    KIDNEY:  Right kidney measures 11.4 cm in length multiple cysts measuring   up to 2.9 cm. No hydronephrosis, calculi or solid mass.    AORTA/IVC:  Visualized proximal portions unremarkable.    ASCITES:  Partially visualized  perihepaticascites.    < end of copied text >        < from: NM PET/CT Onc FDG Skull to Thigh, Subsq (18 @ 10:06) >    IMPRESSION:   Compared to 4/10/2018, multiple new sites of pathologic FDG uptake   consistent with biologic tumor activity including multiple new liver   metastases, and at least one new left para-aortic lymph node.    < end of copied text >

## 2018-09-28 ENCOUNTER — APPOINTMENT (OUTPATIENT)
Dept: INFUSION THERAPY | Facility: CLINIC | Age: 73
End: 2018-09-28

## 2018-09-28 LAB
ALBUMIN SERPL ELPH-MCNC: 3.1 G/DL — LOW (ref 3.5–5.2)
ALP SERPL-CCNC: 695 U/L — HIGH (ref 30–115)
ALT FLD-CCNC: 59 U/L — HIGH (ref 0–41)
ANION GAP SERPL CALC-SCNC: 19 MMOL/L — HIGH (ref 7–14)
AST SERPL-CCNC: 106 U/L — HIGH (ref 0–41)
BASOPHILS # BLD AUTO: 0.07 K/UL — SIGNIFICANT CHANGE UP (ref 0–0.2)
BASOPHILS NFR BLD AUTO: 0.3 % — SIGNIFICANT CHANGE UP (ref 0–1)
BILIRUB DIRECT SERPL-MCNC: 0.4 MG/DL — HIGH (ref 0–0.2)
BILIRUB INDIRECT FLD-MCNC: 0.3 MG/DL — SIGNIFICANT CHANGE UP (ref 0.2–1.2)
BILIRUB SERPL-MCNC: 0.7 MG/DL — SIGNIFICANT CHANGE UP (ref 0.2–1.2)
BUN SERPL-MCNC: 23 MG/DL — HIGH (ref 10–20)
CALCIUM SERPL-MCNC: 8.4 MG/DL — LOW (ref 8.5–10.1)
CHLORIDE SERPL-SCNC: 100 MMOL/L — SIGNIFICANT CHANGE UP (ref 98–110)
CO2 SERPL-SCNC: 19 MMOL/L — SIGNIFICANT CHANGE UP (ref 17–32)
CREAT SERPL-MCNC: 1.6 MG/DL — HIGH (ref 0.7–1.5)
CULTURE RESULTS: NO GROWTH — SIGNIFICANT CHANGE UP
EOSINOPHIL # BLD AUTO: 0.04 K/UL — SIGNIFICANT CHANGE UP (ref 0–0.7)
EOSINOPHIL NFR BLD AUTO: 0.2 % — SIGNIFICANT CHANGE UP (ref 0–8)
GLUCOSE SERPL-MCNC: 108 MG/DL — HIGH (ref 70–99)
HCT VFR BLD CALC: 22.6 % — LOW (ref 42–52)
HGB BLD-MCNC: 7.7 G/DL — LOW (ref 14–18)
IMM GRANULOCYTES NFR BLD AUTO: 1 % — HIGH (ref 0.1–0.3)
INR BLD: 1.39 RATIO — HIGH (ref 0.65–1.3)
LYMPHOCYTES # BLD AUTO: 1.1 K/UL — LOW (ref 1.2–3.4)
LYMPHOCYTES # BLD AUTO: 5.4 % — LOW (ref 20.5–51.1)
MAGNESIUM SERPL-MCNC: 2.2 MG/DL — SIGNIFICANT CHANGE UP (ref 1.8–2.4)
MCHC RBC-ENTMCNC: 27.7 PG — SIGNIFICANT CHANGE UP (ref 27–31)
MCHC RBC-ENTMCNC: 34.1 G/DL — SIGNIFICANT CHANGE UP (ref 32–37)
MCV RBC AUTO: 81.3 FL — SIGNIFICANT CHANGE UP (ref 80–94)
MONOCYTES # BLD AUTO: 3.39 K/UL — HIGH (ref 0.1–0.6)
MONOCYTES NFR BLD AUTO: 16.7 % — HIGH (ref 1.7–9.3)
NEUTROPHILS # BLD AUTO: 15.52 K/UL — HIGH (ref 1.4–6.5)
NEUTROPHILS NFR BLD AUTO: 76.4 % — HIGH (ref 42.2–75.2)
PLATELET # BLD AUTO: 494 K/UL — HIGH (ref 130–400)
POTASSIUM SERPL-MCNC: 4.5 MMOL/L — SIGNIFICANT CHANGE UP (ref 3.5–5)
POTASSIUM SERPL-SCNC: 4.5 MMOL/L — SIGNIFICANT CHANGE UP (ref 3.5–5)
PROT SERPL-MCNC: 6.2 G/DL — SIGNIFICANT CHANGE UP (ref 6–8)
PROTHROM AB SERPL-ACNC: 15 SEC — HIGH (ref 9.95–12.87)
RBC # BLD: 2.78 M/UL — LOW (ref 4.7–6.1)
RBC # FLD: 18.1 % — HIGH (ref 11.5–14.5)
SODIUM SERPL-SCNC: 138 MMOL/L — SIGNIFICANT CHANGE UP (ref 135–146)
SPECIMEN SOURCE: SIGNIFICANT CHANGE UP
WBC # BLD: 20.32 K/UL — HIGH (ref 4.8–10.8)
WBC # FLD AUTO: 20.32 K/UL — HIGH (ref 4.8–10.8)

## 2018-09-28 RX ORDER — PIPERACILLIN AND TAZOBACTAM 4; .5 G/20ML; G/20ML
4.5 INJECTION, POWDER, LYOPHILIZED, FOR SOLUTION INTRAVENOUS EVERY 8 HOURS
Qty: 0 | Refills: 0 | Status: DISCONTINUED | OUTPATIENT
Start: 2018-09-28 | End: 2018-09-29

## 2018-09-28 RX ORDER — CHLORHEXIDINE GLUCONATE 213 G/1000ML
1 SOLUTION TOPICAL
Qty: 0 | Refills: 0 | Status: DISCONTINUED | OUTPATIENT
Start: 2018-09-28 | End: 2018-09-29

## 2018-09-28 RX ORDER — ONDANSETRON 8 MG/1
4 TABLET, FILM COATED ORAL
Qty: 0 | Refills: 0 | Status: DISCONTINUED | OUTPATIENT
Start: 2018-09-28 | End: 2018-09-29

## 2018-09-28 RX ORDER — OXYCODONE HYDROCHLORIDE 5 MG/1
10 TABLET ORAL ONCE
Qty: 0 | Refills: 0 | Status: DISCONTINUED | OUTPATIENT
Start: 2018-09-28 | End: 2018-09-28

## 2018-09-28 RX ORDER — DIPHENHYDRAMINE HCL 50 MG
25 CAPSULE ORAL EVERY 6 HOURS
Qty: 0 | Refills: 0 | Status: DISCONTINUED | OUTPATIENT
Start: 2018-09-28 | End: 2018-09-29

## 2018-09-28 RX ADMIN — Medication 50 MILLIGRAM(S): at 11:20

## 2018-09-28 RX ADMIN — Medication 250 MILLIGRAM(S): at 11:19

## 2018-09-28 RX ADMIN — OXYCODONE HYDROCHLORIDE 10 MILLIGRAM(S): 5 TABLET ORAL at 05:52

## 2018-09-28 RX ADMIN — POLYETHYLENE GLYCOL 3350 17 GRAM(S): 17 POWDER, FOR SOLUTION ORAL at 06:03

## 2018-09-28 RX ADMIN — HEPARIN SODIUM 5000 UNIT(S): 5000 INJECTION INTRAVENOUS; SUBCUTANEOUS at 06:01

## 2018-09-28 RX ADMIN — OXYCODONE HYDROCHLORIDE 10 MILLIGRAM(S): 5 TABLET ORAL at 18:17

## 2018-09-28 RX ADMIN — LACTULOSE 10 GRAM(S): 10 SOLUTION ORAL at 13:46

## 2018-09-28 RX ADMIN — ATORVASTATIN CALCIUM 10 MILLIGRAM(S): 80 TABLET, FILM COATED ORAL at 11:20

## 2018-09-28 RX ADMIN — OXYCODONE HYDROCHLORIDE 10 MILLIGRAM(S): 5 TABLET ORAL at 06:06

## 2018-09-28 RX ADMIN — Medication 50 MILLIGRAM(S): at 06:04

## 2018-09-28 RX ADMIN — OXYCODONE HYDROCHLORIDE 10 MILLIGRAM(S): 5 TABLET ORAL at 03:16

## 2018-09-28 RX ADMIN — LACTULOSE 10 GRAM(S): 10 SOLUTION ORAL at 06:01

## 2018-09-28 RX ADMIN — Medication 25 MILLIGRAM(S): at 12:14

## 2018-09-28 RX ADMIN — PIPERACILLIN AND TAZOBACTAM 25 GRAM(S): 4; .5 INJECTION, POWDER, LYOPHILIZED, FOR SOLUTION INTRAVENOUS at 22:24

## 2018-09-28 RX ADMIN — Medication 81 MILLIGRAM(S): at 11:20

## 2018-09-28 RX ADMIN — PIPERACILLIN AND TAZOBACTAM 25 GRAM(S): 4; .5 INJECTION, POWDER, LYOPHILIZED, FOR SOLUTION INTRAVENOUS at 16:12

## 2018-09-28 RX ADMIN — LACTULOSE 10 GRAM(S): 10 SOLUTION ORAL at 22:24

## 2018-09-28 RX ADMIN — OXYCODONE HYDROCHLORIDE 10 MILLIGRAM(S): 5 TABLET ORAL at 12:13

## 2018-09-28 RX ADMIN — TAMSULOSIN HYDROCHLORIDE 0.4 MILLIGRAM(S): 0.4 CAPSULE ORAL at 22:23

## 2018-09-28 RX ADMIN — OXYCODONE HYDROCHLORIDE 10 MILLIGRAM(S): 5 TABLET ORAL at 06:39

## 2018-09-28 RX ADMIN — Medication 90 MILLIGRAM(S): at 05:59

## 2018-09-28 RX ADMIN — Medication 100 MILLIGRAM(S): at 06:00

## 2018-09-28 RX ADMIN — PANTOPRAZOLE SODIUM 40 MILLIGRAM(S): 20 TABLET, DELAYED RELEASE ORAL at 06:12

## 2018-09-28 RX ADMIN — Medication 100 MILLIGRAM(S): at 13:47

## 2018-09-28 RX ADMIN — HEPARIN SODIUM 5000 UNIT(S): 5000 INJECTION INTRAVENOUS; SUBCUTANEOUS at 22:23

## 2018-09-28 RX ADMIN — SODIUM CHLORIDE 75 MILLILITER(S): 9 INJECTION INTRAMUSCULAR; INTRAVENOUS; SUBCUTANEOUS at 11:19

## 2018-09-28 RX ADMIN — MEROPENEM 100 MILLIGRAM(S): 1 INJECTION INTRAVENOUS at 06:02

## 2018-09-28 RX ADMIN — FINASTERIDE 5 MILLIGRAM(S): 5 TABLET, FILM COATED ORAL at 11:20

## 2018-09-28 RX ADMIN — CHLORHEXIDINE GLUCONATE 1 APPLICATION(S): 213 SOLUTION TOPICAL at 11:19

## 2018-09-28 RX ADMIN — HEPARIN SODIUM 5000 UNIT(S): 5000 INJECTION INTRAVENOUS; SUBCUTANEOUS at 13:47

## 2018-09-28 RX ADMIN — POLYETHYLENE GLYCOL 3350 17 GRAM(S): 17 POWDER, FOR SOLUTION ORAL at 17:18

## 2018-09-28 RX ADMIN — Medication 100 MILLIGRAM(S): at 22:23

## 2018-09-28 RX ADMIN — SENNA PLUS 1 TABLET(S): 8.6 TABLET ORAL at 22:23

## 2018-09-28 RX ADMIN — OXYCODONE HYDROCHLORIDE 10 MILLIGRAM(S): 5 TABLET ORAL at 17:17

## 2018-09-28 RX ADMIN — OXYCODONE HYDROCHLORIDE 10 MILLIGRAM(S): 5 TABLET ORAL at 13:00

## 2018-09-28 NOTE — PROGRESS NOTE ADULT - ASSESSMENT
74 y/o M with PMH of HTN, BPH, PTSD, DLD, CKD 3,  and metastatic neuroendocrine tumor of GB ( on chemo therapy, Topotecan) came with sweating , abdominal pain and generalized weakness.      #Fever with leukocytosis  -Patient on chemo  -ID eval -> IV zosyn q8hrs  -no positive cultures to date    #Metastatic neuroendocrine cancer of GB:  -s/p 2 cycles of Topotecan  -due for topotecan next week.   -f/u oncology recommendation    #Abnormal LFT : secondary to GB cancer with liver mets , superimposed infectious of billary system  -ALP was abnormal in previous lab ( No bone mets showed in PET scan)  -ER lab showed worsening ALP and elevated AST/ALT as compared to outpatient labs  -CT w/o contrast ( no contrast due to RANULFO) and abdominal US did not show any billary system dilatation or cholecystitis , CBD : 6mm ( less likely cholangitis??)  -GGT: 943  -will do daily CMP    #RANULFO on CKD ( basline cr:1.6)  -likely pre-renal  -c/w IV fluid  -monitor BMP and electrolyte    #HTN:  -c/w toprol + Nifedipine    # BPH:  -c/w Flomax + Finasteride    #PTSD:  -c/w Trazadone    #Constipation:  -c/w Miralax + senna + colace  -add lactulose    #GI and DVT PPX  Fom home  Full code 72 y/o M with PMH of HTN, BPH, PTSD, DLD, CKD 3,  and metastatic neuroendocrine tumor of GB ( on chemo therapy, Topotecan) came with sweating , abdominal pain and generalized weakness.      #Fever with leukocytosis  -Patient on chemo  -ID eval -> IV zosyn q8hrs  - as per ID if patient remains afebrile could possibly D/C today  -no positive cultures to date    #Metastatic neuroendocrine cancer of GB:  -s/p 2 cycles of Topotecan  -due for topotecan next week.   -f/u oncology recommendation    #Abnormal LFT  -ALP was abnormal in previous lab ( No bone mets showed in PET scan)  -ER lab showed worsening ALP and elevated AST/ALT as compared to outpatient labs  -CT w/o contrast ( no contrast due to RANULFO) and abdominal US did not show any billary system dilatation or cholecystitis , CBD : 6mm ( less likely cholangitis??)  -GGT: 943  -will do daily CMP    #RANULFO on CKD ( basline cr:1.6)  -likely pre-renal  -c/w IV fluid  -monitor BMP and electrolyte    #Anemia  - myelosuppression and incomplete blood count recovery    #HTN:  -c/w toprol + Nifedipine    # BPH:  -c/w Flomax + Finasteride    #PTSD:  -c/w Trazadone    #Constipation:  -c/w Miralax + senna + colace  -add lactulose    #GI and DVT PPX  Fom home  Full code

## 2018-09-28 NOTE — PROGRESS NOTE ADULT - ASSESSMENT
72 y/o M with PMH of HTN, BPH, PTSD, DLD, CKD 3,  and metastatic neuroendocrine tumor of GB ( on chemo therapy, Topotecan) came with sweating , abdominal pain and generalized weakness.    1.  Fever with leukocytosis, initial workup not revealing.  -Patient is not neutropenic.  -Follow up cultures.  -Continue vancomycin and meropenem.  ID consultation pending.    2. Metastatic neuroendocrine cancer of GB:  -Due for topotecan next week.  Will address this once question of fevers and leukocytosis addressed.    3.  Elevated LFTs: secondary to GB cancer with liver mets  -Elevated alk phos but no bone mets in recent PET scan  -Check GGT and monitor LFTs  -No acute pathology on NC CT abdomen and abdominal ultrasound.  May consider contrast imaging if no improvement in LFTs and RANULFO resolves.    4. RANULFO on CKD (Creatinine 2.1 from baseline 1.6)  -Continue IV fluids for now.  Creatinine improved from 2.1 to 1.9.    5. HTN  -C/w toprol + nifedipine    6. BPH  -C/w flomax + finasteride    7. PTSD  -C/w trazadone    8. Constipation:  -C/w miralax + senna + colace  -Lactulose added. 74 y/o M with PMH of HTN, BPH, PTSD, DLD, CKD 3,  and metastatic neuroendocrine tumor of GB ( on chemo therapy, Topotecan) came with sweating , abdominal pain and generalized weakness.    1.  Fever with leukocytosis, initial workup not revealing.  -Patient is not neutropenic.  -Follow up cultures.  -Continue vancomycin and meropenem.  ID consultation pending.    2. Metastatic neuroendocrine cancer of GB:  -Due for topotecan next week.  Will address this once question of fevers and leukocytosis addressed.    3.  Elevated LFTs: secondary to GB cancer with liver mets  -Elevated alk phos but no bone mets in recent PET scan  -GGT elevated.  C/w hepatic origin.  Monitor LFTs  -No acute pathology on NC CT abdomen and abdominal ultrasound.  May consider contrast imaging if no improvement in LFTs and RANULFO resolves.    4. RANULFO on CKD (Creatinine 2.1 from baseline 1.6)  -Continue IV fluids for now.  Creatinine improved from 2.1 to 1.9.    5. HTN  -C/w toprol + nifedipine    6. BPH  -C/w flomax + finasteride    7. PTSD  -C/w trazadone    8. Constipation:  -C/w miralax + senna + colace  -Lactulose added with noted improvement by patient. 74 y/o M with PMH of HTN, BPH, PTSD, DLD, CKD 3,  and metastatic neuroendocrine tumor of GB ( on chemo therapy, Topotecan) came with sweating , abdominal pain and generalized weakness.    1.  Fever with leukocytosis, initial workup not revealing.  -Patient is not neutropenic.  -Follow up cultures.  -Continue vancomycin and meropenem.  ID consult noted  2. Metastatic neuroendocrine cancer of GB:  -Due for topotecan next week.  Will address this once question of fevers and leukocytosis ( likely due to Neulasta)  addressed.    3.  Elevated LFTs: secondary to GB cancer with liver mets  -Elevated alk phos but no bone mets in recent PET scan  -GGT elevated.  C/w hepatic origin.  Monitor LFTs  -No acute pathology on NC CT abdomen and abdominal ultrasound.  May consider contrast imaging if no improvement in LFTs and RANULFO resolves.    4. RANULFO on CKD (Creatinine 2.1 from baseline 1.6)  -Continue IV fluids for now.  Creatinine improved from 2.1 to 1.9.    5. HTN  -C/w toprol + nifedipine    6. BPH  -C/w flomax + finasteride    7. PTSD  -C/w trazadone    8. Constipation:  -C/w miralax + senna + colace  -Lactulose added with noted improvement by patient.  9. Anemia multifactorial : myelosuppresssion and CRI  Transfuse 1 unit PRBCs

## 2018-09-28 NOTE — CONSULT NOTE ADULT - SUBJECTIVE AND OBJECTIVE BOX
ARACELIS RODRIGUEZ  73y, Male  Allergy: No Known Allergies      HPI:  72 y/o M with PMH of HTN, BPH, PTSD, DLD, CKD 3,  and metastatic neuroendocrine tumor of GB ( on chemo therapy, Topotecan) came with sweating , abdominal pain and generalized weakness.  As per patient, he took his temp at home and it was 100.1 . He has had chronic RUQ abdominal pain, sharp, radiating to back on oxycodone. He had generalized abdominal pain yesterday last for 2-3 hours. He also felt nauseous yesterday but denies vomiting.  Currently he denies any abdominal pain.  He also complains of constipation and hard stool. He had BM yesterday. He has been trying multiple laxative with minimal effect.  He denies any C.P, cough, chills, Headache, jaundice or dysuria.    Oncology history: He received 7 cycles of carboplatin and ectoposide in 2018, then switched to FOLFIRI s/p 13 cycles in 2018. He was recently switched to Topotecan in 2018 and received 2 cycles, last one was end of August. He was supposed to receive 3rd cycle on Tuesday.    Presently feels well and has no complaints    FAMILY HISTORY:  Family history of Alzheimer's disease (Mother)    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  BPH (benign prostatic hyperplasia)  PTSD (post-traumatic stress disorder)  Hypertension  Dyslipidemia  CKD (chronic kidney disease), stage III  No significant past surgical history        VITALS:  T(F): 97.9, Max: 99.3 (18 @ 05:43)  HR: 91  BP: 145/69  RR: 20Vital Signs Last 24 Hrs  T(C): 36.6 (28 Sep 2018 12:46), Max: 37.4 (28 Sep 2018 05:43)  T(F): 97.9 (28 Sep 2018 12:46), Max: 99.3 (28 Sep 2018 05:43)  HR: 91 (28 Sep 2018 12:46) (78 - 91)  BP: 145/69 (28 Sep 2018 12:46) (129/60 - 167/75)  BP(mean): --  RR: 20 (28 Sep 2018 12:46) (18 - 20)  SpO2: 99% (28 Sep 2018 07:32) (99% - 100%)    TESTS & MEASUREMENTS:                        7.7    20.32 )-----------( 494      ( 28 Sep 2018 09:44 )             22.6         138  |  100  |  23<H>  ----------------------------<  108<H>  4.5   |  19  |  1.6<H>    Ca    8.4<L>      28 Sep 2018 09:44  Mg     2.2         TPro  6.2  /  Alb  3.1<L>  /  TBili  0.7  /  DBili  0.4<H>  /  AST  106<H>  /  ALT  59<H>  /  AlkPhos  695<H>      LIVER FUNCTIONS - ( 28 Sep 2018 09:44 )  Alb: 3.1 g/dL / Pro: 6.2 g/dL / ALK PHOS: 695 U/L / ALT: 59 U/L / AST: 106 U/L / GGT: x             Culture - Urine (collected 18 @ 20:45)  Source: .Urine Clean Catch (Midstream)  Final Report (18 @ 05:37):    No growth    Culture - Blood (collected 18 @ 20:45)  Source: .Blood Blood  Preliminary Report (18 @ 03:01):    No growth to date.    Culture - Blood (collected 18 @ 20:45)  Source: .Blood Blood  Preliminary Report (18 @ 03:01):    No growth to date.      Urinalysis Basic - ( 26 Sep 2018 20:45 )    Color: Yellow / Appearance: Cloudy / S.010 / pH: x  Gluc: x / Ketone: Negative  / Bili: Negative / Urobili: 0.2 mg/dL   Blood: x / Protein: 30 mg/dL / Nitrite: Negative   Leuk Esterase: Negative / RBC: x / WBC x   Sq Epi: x / Non Sq Epi: Occasional /HPF / Bacteria: x          RADIOLOGY & ADDITIONAL TESTS:    ANTIBIOTICS:  meropenem  IVPB      meropenem  IVPB 1000 milliGRAM(s) IV Intermittent every 12 hours  vancomycin  IVPB 1000 milliGRAM(s) IV Intermittent daily

## 2018-09-28 NOTE — CONSULT NOTE ADULT - ASSESSMENT
72 y/o M with PMH of HTN, BPH, PTSD, DLD, CKD 3,  and metastatic neuroendocrine tumor of GB ( on chemo therapy, Topotecan) came with sweating , abdominal pain and generalized weakness.    No evidence of ongoing infectious issue  Blood cultures are negative to date.  Increased ALK and GGT secondary to metastatic liver disease    RECOMMENDATIONS:  Zosyn 4,5 gm iv q8h  D/C other antibiotics.  If no fevers for 24h and remains asymptomatic could discharge without antibiotics.

## 2018-09-28 NOTE — CONSULT NOTE ADULT - SUBJECTIVE AND OBJECTIVE BOX
ARACELIS RODRIGUEZ  73y, Male  Allergy: No Known Allergies      HPI:  74 y/o M with PMH of HTN, BPH, PTSD, DLD, CKD 3,  and metastatic neuroendocrine tumor of GB ( on chemo therapy, Topotecan) came with sweating , abdominal pain and generalized weakness.  As per patient, he took his temp at home and it was 100.1 . He has had chronic RUQ abdominal pain, sharp, radiating to back on oxycodone. He had generalized abdominal pain yesterday last for 2-3 hours. He also felt nauseous yesterday but denies vomiting.  Currently he denies any abdominal pain.  He also complains of constipation and hard stool. He had BM yesterday. He has been trying multiple laxative with minimal effect.  He denies any C.P, cough, chills, Headache, jaundice or dysuria.    Oncology history: He received 7 cycles of carboplatin and ectoposide in 2018, then switched to FOLFIRI s/p 13 cycles in 2018. He was recently switched to Topotecan in 2018 and received 2 cycles, last one was end of August. He was supposed to receive 3rd cycle on Tuesday (27 Sep 2018 12:01)    FAMILY HISTORY:  Family history of Alzheimer's disease (Mother)    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  BPH (benign prostatic hyperplasia)  PTSD (post-traumatic stress disorder)  Hypertension  Dyslipidemia  CKD (chronic kidney disease), stage III  No significant past surgical history        VITALS:  T(F): 97.9, Max: 99.3 (18 @ 05:43)  HR: 91  BP: 145/69  RR: 20Vital Signs Last 24 Hrs  T(C): 36.6 (28 Sep 2018 12:46), Max: 37.4 (28 Sep 2018 05:43)  T(F): 97.9 (28 Sep 2018 12:46), Max: 99.3 (28 Sep 2018 05:43)  HR: 91 (28 Sep 2018 12:46) (78 - 91)  BP: 145/69 (28 Sep 2018 12:46) (129/60 - 167/75)  BP(mean): --  RR: 20 (28 Sep 2018 12:46) (18 - 20)  SpO2: 99% (28 Sep 2018 07:32) (99% - 100%)    TESTS & MEASUREMENTS:                        7.7    20.32 )-----------( 494      ( 28 Sep 2018 09:44 )             22.6         138  |  100  |  23<H>  ----------------------------<  108<H>  4.5   |  19  |  1.6<H>    Ca    8.4<L>      28 Sep 2018 09:44  Mg     2.2         TPro  6.2  /  Alb  3.1<L>  /  TBili  0.7  /  DBili  0.4<H>  /  AST  106<H>  /  ALT  59<H>  /  AlkPhos  695<H>      LIVER FUNCTIONS - ( 28 Sep 2018 09:44 )  Alb: 3.1 g/dL / Pro: 6.2 g/dL / ALK PHOS: 695 U/L / ALT: 59 U/L / AST: 106 U/L / GGT: x             Culture - Urine (collected 18 @ 20:45)  Source: .Urine Clean Catch (Midstream)  Final Report (18 @ 05:37):    No growth    Culture - Blood (collected 18 @ 20:45)  Source: .Blood Blood  Preliminary Report (18 @ 03:01):    No growth to date.    Culture - Blood (collected 18 @ 20:45)  Source: .Blood Blood  Preliminary Report (18 @ 03:01):    No growth to date.      Urinalysis Basic - ( 26 Sep 2018 20:45 )    Color: Yellow / Appearance: Cloudy / S.010 / pH: x  Gluc: x / Ketone: Negative  / Bili: Negative / Urobili: 0.2 mg/dL   Blood: x / Protein: 30 mg/dL / Nitrite: Negative   Leuk Esterase: Negative / RBC: x / WBC x   Sq Epi: x / Non Sq Epi: Occasional /HPF / Bacteria: x          RADIOLOGY & ADDITIONAL TESTS:    ANTIBIOTICS:  meropenem  IVPB      meropenem  IVPB 1000 milliGRAM(s) IV Intermittent every 12 hours  vancomycin  IVPB 1000 milliGRAM(s) IV Intermittent daily

## 2018-09-28 NOTE — PROGRESS NOTE ADULT - SUBJECTIVE AND OBJECTIVE BOX
SUBJECTIVE:    Patient is a 73y old Male who presents with a chief complaint of Weakness and sweating (28 Sep 2018 14:08)    Patient reports feeling symptomatically better this morning. His abdominal pain is much better today.     PAST MEDICAL & SURGICAL HISTORY  Neuroendocrine carcinoma metastatic to liver  BPH (benign prostatic hyperplasia)  PTSD (post-traumatic stress disorder)  Hypertension  Dyslipidemia  CKD (chronic kidney disease), stage III  No significant past surgical history    SOCIAL HISTORY:  Negative for smoking/alcohol/drug use.     ALLERGIES:  No Known Allergies    MEDICATIONS:  STANDING MEDICATIONS  aspirin  chewable 81 milliGRAM(s) Oral daily  atorvastatin Oral Tab/Cap - Peds 10 milliGRAM(s) Oral daily  chlorhexidine 4% Liquid 1 Application(s) Topical <User Schedule>  docusate sodium 100 milliGRAM(s) Oral three times a day  finasteride 5 milliGRAM(s) Oral daily  heparin  Injectable 5000 Unit(s) SubCutaneous every 8 hours  lactulose Syrup 10 Gram(s) Oral three times a day  metoprolol succinate ER 50 milliGRAM(s) Oral daily  NIFEdipine XL 90 milliGRAM(s) Oral daily  oxyCODONE    IR 10 milliGRAM(s) Oral every 6 hours  pantoprazole    Tablet 40 milliGRAM(s) Oral before breakfast  piperacillin/tazobactam IVPB. 4.5 Gram(s) IV Intermittent every 8 hours  polyethylene glycol 3350 17 Gram(s) Oral two times a day  senna 8.6 milliGRAM(s) Oral Tablet - Peds 1 Tablet(s) Oral at bedtime  sodium chloride 0.9%. 1000 milliLiter(s) IV Continuous <Continuous>  tamsulosin 0.4 milliGRAM(s) Oral at bedtime  traZODone 50 milliGRAM(s) Oral daily    PRN MEDICATIONS  diphenhydrAMINE   Capsule 25 milliGRAM(s) Oral every 6 hours PRN  ondansetron    Tablet 4 milliGRAM(s) Oral two times a day PRN    VITALS:   T(F): 97.9  HR: 91  BP: 145/69  RR: 20  SpO2: 99%    LABS:                        7.7    20.32 )-----------( 494      ( 28 Sep 2018 09:44 )             22.6     09-28    138  |  100  |  23<H>  ----------------------------<  108<H>  4.5   |  19  |  1.6<H>    Ca    8.4<L>      28 Sep 2018 09:44  Mg     2.2         TPro  6.2  /  Alb  3.1<L>  /  TBili  0.7  /  DBili  0.4<H>  /  AST  106<H>  /  ALT  59<H>  /  AlkPhos  695<H>      PT/INR - ( 28 Sep 2018 09:44 )   PT: 15.00 sec;   INR: 1.39 ratio           Urinalysis Basic - ( 26 Sep 2018 20:45 )    Color: Yellow / Appearance: Cloudy / S.010 / pH: x  Gluc: x / Ketone: Negative  / Bili: Negative / Urobili: 0.2 mg/dL   Blood: x / Protein: 30 mg/dL / Nitrite: Negative   Leuk Esterase: Negative / RBC: x / WBC x   Sq Epi: x / Non Sq Epi: Occasional /HPF / Bacteria: x            Culture - Urine (collected 26 Sep 2018 20:45)  Source: .Urine Clean Catch (Midstream)  Final Report (28 Sep 2018 05:37):    No growth    Culture - Blood (collected 26 Sep 2018 20:45)  Source: .Blood Blood  Preliminary Report (28 Sep 2018 03:01):    No growth to date.    Culture - Blood (collected 26 Sep 2018 20:45)  Source: .Blood Blood  Preliminary Report (28 Sep 2018 03:01):    No growth to date.      CARDIAC MARKERS ( 26 Sep 2018 20:45 )  x     / <0.01 ng/mL / x     / x     / x              18 @ 07:01  -  18 @ 16:08  --------------------------------------------------------  IN: 625 mL / OUT: 0 mL / NET: 625 mL          PHYSICAL EXAM:  GEN: NAD  LUNGS: CTAB  HEART: RRR, systolic murmur on right upper sternal border,   ABD: soft, +BS  EXT: no edema, PP b/l  NEURO: AAOx3 SUBJECTIVE:    Patient is a 73y old Male who presents with a chief complaint of Weakness and sweating (28 Sep 2018 14:08)    Patient reports feeling symptomatically better this morning. His abdominal pain is much better today.     PAST MEDICAL & SURGICAL HISTORY  Neuroendocrine carcinoma metastatic to liver  BPH (benign prostatic hyperplasia)  PTSD (post-traumatic stress disorder)  Hypertension  Dyslipidemia  CKD (chronic kidney disease), stage III  No significant past surgical history    SOCIAL HISTORY:  Negative for smoking/alcohol/drug use.     ALLERGIES:  No Known Allergies    MEDICATIONS:  STANDING MEDICATIONS  aspirin  chewable 81 milliGRAM(s) Oral daily  atorvastatin Oral Tab/Cap - Peds 10 milliGRAM(s) Oral daily  chlorhexidine 4% Liquid 1 Application(s) Topical <User Schedule>  docusate sodium 100 milliGRAM(s) Oral three times a day  finasteride 5 milliGRAM(s) Oral daily  heparin  Injectable 5000 Unit(s) SubCutaneous every 8 hours  lactulose Syrup 10 Gram(s) Oral three times a day  metoprolol succinate ER 50 milliGRAM(s) Oral daily  NIFEdipine XL 90 milliGRAM(s) Oral daily  oxyCODONE    IR 10 milliGRAM(s) Oral every 6 hours  pantoprazole    Tablet 40 milliGRAM(s) Oral before breakfast  piperacillin/tazobactam IVPB. 4.5 Gram(s) IV Intermittent every 8 hours  polyethylene glycol 3350 17 Gram(s) Oral two times a day  senna 8.6 milliGRAM(s) Oral Tablet - Peds 1 Tablet(s) Oral at bedtime  sodium chloride 0.9%. 1000 milliLiter(s) IV Continuous <Continuous>  tamsulosin 0.4 milliGRAM(s) Oral at bedtime  traZODone 50 milliGRAM(s) Oral daily    PRN MEDICATIONS  diphenhydrAMINE   Capsule 25 milliGRAM(s) Oral every 6 hours PRN  ondansetron    Tablet 4 milliGRAM(s) Oral two times a day PRN    VITALS:   T(F): 97.9  HR: 91  BP: 145/69  RR: 20  SpO2: 99%    LABS:                        7.7    20.32 )-----------( 494      ( 28 Sep 2018 09:44 )             22.6     09-28    138  |  100  |  23<H>  ----------------------------<  108<H>  4.5   |  19  |  1.6<H>    Ca    8.4<L>      28 Sep 2018 09:44  Mg     2.2         TPro  6.2  /  Alb  3.1<L>  /  TBili  0.7  /  DBili  0.4<H>  /  AST  106<H>  /  ALT  59<H>  /  AlkPhos  695<H>      PT/INR - ( 28 Sep 2018 09:44 )   PT: 15.00 sec;   INR: 1.39 ratio           Urinalysis Basic - ( 26 Sep 2018 20:45 )    Color: Yellow / Appearance: Cloudy / S.010 / pH: x  Gluc: x / Ketone: Negative  / Bili: Negative / Urobili: 0.2 mg/dL   Blood: x / Protein: 30 mg/dL / Nitrite: Negative   Leuk Esterase: Negative / RBC: x / WBC x   Sq Epi: x / Non Sq Epi: Occasional /HPF / Bacteria: x            Culture - Urine (collected 26 Sep 2018 20:45)  Source: .Urine Clean Catch (Midstream)  Final Report (28 Sep 2018 05:37):    No growth    Culture - Blood (collected 26 Sep 2018 20:45)  Source: .Blood Blood  Preliminary Report (28 Sep 2018 03:01):    No growth to date.    Culture - Blood (collected 26 Sep 2018 20:45)  Source: .Blood Blood  Preliminary Report (28 Sep 2018 03:01):    No growth to date.      CARDIAC MARKERS ( 26 Sep 2018 20:45 )  x     / <0.01 ng/mL / x     / x     / x              18 @ 07:01  -  18 @ 16:08  --------------------------------------------------------  IN: 625 mL / OUT: 0 mL / NET: 625 mL          PHYSICAL EXAM:  GEN: NAD  LUNGS: CTAB  HEART: RRR  ABD: soft, abdominal distention, +BS  EXT: no edema, PP b/l  NEURO: AAOx3

## 2018-09-29 ENCOUNTER — TRANSCRIPTION ENCOUNTER (OUTPATIENT)
Age: 73
End: 2018-09-29

## 2018-09-29 VITALS — OXYGEN SATURATION: 97 %

## 2018-09-29 LAB
ALBUMIN SERPL ELPH-MCNC: 3.1 G/DL — LOW (ref 3.5–5.2)
ALP SERPL-CCNC: 759 U/L — HIGH (ref 30–115)
ALT FLD-CCNC: 58 U/L — HIGH (ref 0–41)
ANION GAP SERPL CALC-SCNC: 18 MMOL/L — HIGH (ref 7–14)
AST SERPL-CCNC: 118 U/L — HIGH (ref 0–41)
BILIRUB SERPL-MCNC: 1 MG/DL — SIGNIFICANT CHANGE UP (ref 0.2–1.2)
BLD GP AB SCN SERPL QL: SIGNIFICANT CHANGE UP
BUN SERPL-MCNC: 22 MG/DL — HIGH (ref 10–20)
CALCIUM SERPL-MCNC: 8.2 MG/DL — LOW (ref 8.5–10.1)
CANCER AG19-9 SERPL-ACNC: 198.3 U/ML — HIGH
CHLORIDE SERPL-SCNC: 102 MMOL/L — SIGNIFICANT CHANGE UP (ref 98–110)
CO2 SERPL-SCNC: 19 MMOL/L — SIGNIFICANT CHANGE UP (ref 17–32)
CREAT SERPL-MCNC: 1.7 MG/DL — HIGH (ref 0.7–1.5)
GLUCOSE SERPL-MCNC: 87 MG/DL — SIGNIFICANT CHANGE UP (ref 70–99)
POTASSIUM SERPL-MCNC: 4.4 MMOL/L — SIGNIFICANT CHANGE UP (ref 3.5–5)
POTASSIUM SERPL-SCNC: 4.4 MMOL/L — SIGNIFICANT CHANGE UP (ref 3.5–5)
PROT SERPL-MCNC: 6 G/DL — SIGNIFICANT CHANGE UP (ref 6–8)
SODIUM SERPL-SCNC: 139 MMOL/L — SIGNIFICANT CHANGE UP (ref 135–146)
TYPE + AB SCN PNL BLD: SIGNIFICANT CHANGE UP

## 2018-09-29 RX ADMIN — OXYCODONE HYDROCHLORIDE 10 MILLIGRAM(S): 5 TABLET ORAL at 13:11

## 2018-09-29 RX ADMIN — FINASTERIDE 5 MILLIGRAM(S): 5 TABLET, FILM COATED ORAL at 12:05

## 2018-09-29 RX ADMIN — PIPERACILLIN AND TAZOBACTAM 25 GRAM(S): 4; .5 INJECTION, POWDER, LYOPHILIZED, FOR SOLUTION INTRAVENOUS at 06:40

## 2018-09-29 RX ADMIN — Medication 81 MILLIGRAM(S): at 12:05

## 2018-09-29 RX ADMIN — Medication 90 MILLIGRAM(S): at 06:38

## 2018-09-29 RX ADMIN — OXYCODONE HYDROCHLORIDE 10 MILLIGRAM(S): 5 TABLET ORAL at 07:57

## 2018-09-29 RX ADMIN — Medication 100 MILLIGRAM(S): at 06:38

## 2018-09-29 RX ADMIN — OXYCODONE HYDROCHLORIDE 10 MILLIGRAM(S): 5 TABLET ORAL at 02:05

## 2018-09-29 RX ADMIN — PANTOPRAZOLE SODIUM 40 MILLIGRAM(S): 20 TABLET, DELAYED RELEASE ORAL at 06:38

## 2018-09-29 RX ADMIN — OXYCODONE HYDROCHLORIDE 10 MILLIGRAM(S): 5 TABLET ORAL at 00:43

## 2018-09-29 RX ADMIN — OXYCODONE HYDROCHLORIDE 10 MILLIGRAM(S): 5 TABLET ORAL at 12:03

## 2018-09-29 RX ADMIN — ATORVASTATIN CALCIUM 10 MILLIGRAM(S): 80 TABLET, FILM COATED ORAL at 12:05

## 2018-09-29 RX ADMIN — Medication 50 MILLIGRAM(S): at 06:38

## 2018-09-29 RX ADMIN — HEPARIN SODIUM 5000 UNIT(S): 5000 INJECTION INTRAVENOUS; SUBCUTANEOUS at 06:40

## 2018-09-29 RX ADMIN — OXYCODONE HYDROCHLORIDE 10 MILLIGRAM(S): 5 TABLET ORAL at 06:38

## 2018-09-29 NOTE — DISCHARGE NOTE ADULT - PLAN OF CARE
Continue follow up Please make an appointment with your cancer physician Dr. French after discharge within 2 weeks. Please also make an appointment with your primary care provider within 1 week of discharge. Continue monitoring You have elevated liver enzymes which you will need to continue to monitor. Please follow up with your primary care physician within 1 week of discharge.

## 2018-09-29 NOTE — PROGRESS NOTE ADULT - SUBJECTIVE AND OBJECTIVE BOX
Patient is a 73y old  Male who presents with a chief complaint of Weakness and sweating (28 Sep 2018 16:08)      Subjective:      Vital Signs Last 24 Hrs  T(C): 37.4 (29 Sep 2018 04:25), Max: 37.8 (28 Sep 2018 21:00)  T(F): 99.3 (29 Sep 2018 04:25), Max: 100.1 (28 Sep 2018 21:00)  HR: 74 (29 Sep 2018 04:25) (74 - 91)  BP: 139/65 (29 Sep 2018 04:25) (128/61 - 145/69)  BP(mean): --  RR: 18 (29 Sep 2018 04:25) (18 - 20)  SpO2: --    PHYSICAL EXAM  General: adult in NAD  HEENT: clear oropharynx, anicteric sclera, pink conjunctiva  Neck: supple  CV: normal S1/S2 with no murmur rubs or gallops  Lungs: positive air movement b/l ant lungs,clear to auscultation, no wheezes, no rales  Abdomen: soft non-tender non-distended, no hepatosplenomegaly  Ext: no clubbing cyanosis or edema  Skin: no rashes and no petechiae  Neuro: alert and oriented X 4, no focal deficits    MEDICATIONS  (STANDING):  aspirin  chewable 81 milliGRAM(s) Oral daily  atorvastatin Oral Tab/Cap - Peds 10 milliGRAM(s) Oral daily  chlorhexidine 4% Liquid 1 Application(s) Topical <User Schedule>  docusate sodium 100 milliGRAM(s) Oral three times a day  finasteride 5 milliGRAM(s) Oral daily  heparin  Injectable 5000 Unit(s) SubCutaneous every 8 hours  lactulose Syrup 10 Gram(s) Oral three times a day  metoprolol succinate ER 50 milliGRAM(s) Oral daily  NIFEdipine XL 90 milliGRAM(s) Oral daily  oxyCODONE    IR 10 milliGRAM(s) Oral every 6 hours  pantoprazole    Tablet 40 milliGRAM(s) Oral before breakfast  piperacillin/tazobactam IVPB. 4.5 Gram(s) IV Intermittent every 8 hours  polyethylene glycol 3350 17 Gram(s) Oral two times a day  senna 8.6 milliGRAM(s) Oral Tablet - Peds 1 Tablet(s) Oral at bedtime  sodium chloride 0.9%. 1000 milliLiter(s) (75 mL/Hr) IV Continuous <Continuous>  tamsulosin 0.4 milliGRAM(s) Oral at bedtime  traZODone 50 milliGRAM(s) Oral daily    MEDICATIONS  (PRN):  diphenhydrAMINE   Capsule 25 milliGRAM(s) Oral every 6 hours PRN Rash and/or Itching  ondansetron    Tablet 4 milliGRAM(s) Oral two times a day PRN Nausea and/or Vomiting      LABS:                          7.7    20.32 )-----------( 494      ( 28 Sep 2018 09:44 )             22.6         Mean Cell Volume : 81.3 fL  Mean Cell Hemoglobin : 27.7 pg  Mean Cell Hemoglobin Concentration : 34.1 g/dL  Auto Neutrophil # : 15.52 K/uL  Auto Lymphocyte # : 1.10 K/uL  Auto Monocyte # : 3.39 K/uL  Auto Eosinophil # : 0.04 K/uL  Auto Basophil # : 0.07 K/uL  Auto Neutrophil % : 76.4 %  Auto Lymphocyte % : 5.4 %  Auto Monocyte % : 16.7 %  Auto Eosinophil % : 0.2 %  Auto Basophil % : 0.3 %      Serial CBC's  09-28 @ 09:44  Hct-22.6 / Hgb-7.7 / Plat-494 / RBC-2.78 / WBC-20.32  Serial CBC's  09-27 @ 16:51  Hct-22.2 / Hgb-7.5 / Plat-449 / RBC-2.71 / WBC-21.04  Serial CBC's  09-26 @ 20:45  Hct-21.8 / Hgb-7.5 / Plat-466 / RBC-2.70 / WBC-22.01      09-28    138  |  100  |  23<H>  ----------------------------<  108<H>  4.5   |  19  |  1.6<H>    Ca    8.4<L>      28 Sep 2018 09:44  Mg     2.2     09-28    TPro  6.2  /  Alb  3.1<L>  /  TBili  0.7  /  DBili  0.4<H>  /  AST  106<H>  /  ALT  59<H>  /  AlkPhos  695<H>  09-28      PT/INR - ( 28 Sep 2018 09:44 )   PT: 15.00 sec;   INR: 1.39 ratio                               Culture - Urine (collected 26 Sep 2018 20:45)  Source: .Urine Clean Catch (Midstream)  Final Report (28 Sep 2018 05:37):    No growth    Culture - Blood (collected 26 Sep 2018 20:45)  Source: .Blood Blood  Preliminary Report (28 Sep 2018 03:01):    No growth to date.    Culture - Blood (collected 26 Sep 2018 20:45)  Source: .Blood Blood  Preliminary Report (28 Sep 2018 03:01):    No growth to date.      RADIOLOGY & ADDITIONAL STUDIES:  no new imaging Patient is a 73y old  Male who presents with a chief complaint of Weakness and sweating (28 Sep 2018 16:08)      Subjective: Patient had a low fever 100.1. no other issues      Vital Signs Last 24 Hrs  T(C): 37.4 (29 Sep 2018 04:25), Max: 37.8 (28 Sep 2018 21:00)  T(F): 99.3 (29 Sep 2018 04:25), Max: 100.1 (28 Sep 2018 21:00)  HR: 74 (29 Sep 2018 04:25) (74 - 91)  BP: 139/65 (29 Sep 2018 04:25) (128/61 - 145/69)  BP(mean): --  RR: 18 (29 Sep 2018 04:25) (18 - 20)  SpO2: --    PHYSICAL EXAM  General: adult in NAD  HEENT: clear oropharynx, anicteric sclera, pink conjunctiva  Neck: supple  CV: normal S1/S2 with no murmur rubs or gallops  Lungs: positive air movement b/l ant lungs,clear to auscultation, no wheezes, no rales  Abdomen: soft non-tender non-distended, no hepatosplenomegaly  Ext: no clubbing cyanosis or edema  Skin: no rashes and no petechiae  Neuro: alert and oriented X 4, no focal deficits    MEDICATIONS  (STANDING):  aspirin  chewable 81 milliGRAM(s) Oral daily  atorvastatin Oral Tab/Cap - Peds 10 milliGRAM(s) Oral daily  chlorhexidine 4% Liquid 1 Application(s) Topical <User Schedule>  docusate sodium 100 milliGRAM(s) Oral three times a day  finasteride 5 milliGRAM(s) Oral daily  heparin  Injectable 5000 Unit(s) SubCutaneous every 8 hours  lactulose Syrup 10 Gram(s) Oral three times a day  metoprolol succinate ER 50 milliGRAM(s) Oral daily  NIFEdipine XL 90 milliGRAM(s) Oral daily  oxyCODONE    IR 10 milliGRAM(s) Oral every 6 hours  pantoprazole    Tablet 40 milliGRAM(s) Oral before breakfast  piperacillin/tazobactam IVPB. 4.5 Gram(s) IV Intermittent every 8 hours  polyethylene glycol 3350 17 Gram(s) Oral two times a day  senna 8.6 milliGRAM(s) Oral Tablet - Peds 1 Tablet(s) Oral at bedtime  sodium chloride 0.9%. 1000 milliLiter(s) (75 mL/Hr) IV Continuous <Continuous>  tamsulosin 0.4 milliGRAM(s) Oral at bedtime  traZODone 50 milliGRAM(s) Oral daily    MEDICATIONS  (PRN):  diphenhydrAMINE   Capsule 25 milliGRAM(s) Oral every 6 hours PRN Rash and/or Itching  ondansetron    Tablet 4 milliGRAM(s) Oral two times a day PRN Nausea and/or Vomiting      LABS:                          7.7    20.32 )-----------( 494      ( 28 Sep 2018 09:44 )             22.6                             7.7    20.32 )-----------( 494      ( 28 Sep 2018 09:44 )             22.6         Mean Cell Volume : 81.3 fL  Mean Cell Hemoglobin : 27.7 pg  Mean Cell Hemoglobin Concentration : 34.1 g/dL  Auto Neutrophil # : 15.52 K/uL  Auto Lymphocyte # : 1.10 K/uL  Auto Monocyte # : 3.39 K/uL  Auto Eosinophil # : 0.04 K/uL  Auto Basophil # : 0.07 K/uL  Auto Neutrophil % : 76.4 %  Auto Lymphocyte % : 5.4 %  Auto Monocyte % : 16.7 %  Auto Eosinophil % : 0.2 %  Auto Basophil % : 0.3 %      Serial CBC's  09-28 @ 09:44  Hct-22.6 / Hgb-7.7 / Plat-494 / RBC-2.78 / WBC-20.32  Serial CBC's  09-27 @ 16:51  Hct-22.2 / Hgb-7.5 / Plat-449 / RBC-2.71 / WBC-21.04  Serial CBC's  09-26 @ 20:45  Hct-21.8 / Hgb-7.5 / Plat-466 / RBC-2.70 / WBC-22.01      09-28    138  |  100  |  23<H>  ----------------------------<  108<H>  4.5   |  19  |  1.6<H>    Ca    8.4<L>      28 Sep 2018 09:44  Mg     2.2     09-28    TPro  6.2  /  Alb  3.1<L>  /  TBili  0.7  /  DBili  0.4<H>  /  AST  106<H>  /  ALT  59<H>  /  AlkPhos  695<H>  09-28      PT/INR - ( 28 Sep 2018 09:44 )   PT: 15.00 sec;   INR: 1.39 ratio             Culture - Urine (collected 26 Sep 2018 20:45)  Source: .Urine Clean Catch (Midstream)  Final Report (28 Sep 2018 05:37):    No growth    Culture - Blood (collected 26 Sep 2018 20:45)  Source: .Blood Blood  Preliminary Report (28 Sep 2018 03:01):    No growth to date.    Culture - Blood (collected 26 Sep 2018 20:45)  Source: .Blood Blood  Preliminary Report (28 Sep 2018 03:01):    No growth to date.      RADIOLOGY & ADDITIONAL STUDIES:  no new imaging

## 2018-09-29 NOTE — DISCHARGE NOTE ADULT - CARE PLAN
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 French after discharge within 2 weeks. Please also make an appointment with your primary care provider within 1 week of discharge.  Secondary Diagnosis:	Elevated liver enzymes  Goal:	Continue monitoring  Assessment and plan of treatment:	You have elevated liver enzymes which you will need to continue to monitor. Please follow up with your primary care physician within 1 week of discharge.

## 2018-09-29 NOTE — DISCHARGE NOTE ADULT - PATIENT PORTAL LINK FT
You can access the [facility] Patient Portal, offered by [facility], by registering with the following website: [email or web address]

## 2018-09-29 NOTE — PROGRESS NOTE ADULT - ASSESSMENT
72 y/o M with PMH of HTN, BPH, PTSD, DLD, CKD 3,  and metastatic neuroendocrine tumor of GB ( on chemo therapy, Topotecan) came with sweating , abdominal pain and generalized weakness.    1.  Fever with leukocytosis, initial workup not revealing.  -Patient is not neutropenic.  -Follow up cultures.  -Continue vancomycin and meropenem.  ID consult noted  2. Metastatic neuroendocrine cancer of GB:  -Due for topotecan next week.  Will address this once question of fevers and leukocytosis ( likely due to Neulasta)  addressed.    3.  Elevated LFTs: secondary to GB cancer with liver mets  -Elevated alk phos but no bone mets in recent PET scan  -GGT elevated.  C/w hepatic origin.  Monitor LFTs  -No acute pathology on NC CT abdomen and abdominal ultrasound.  May consider contrast imaging if no improvement in LFTs and RANULFO resolves.    4. RANULFO on CKD (Creatinine 2.1 from baseline 1.6)  -Continue IV fluids for now.  Creatinine improved from 2.1 to 1.9.    5. HTN  -C/w toprol + nifedipine    6. BPH  -C/w flomax + finasteride    7. PTSD  -C/w trazadone    8. Constipation:  -C/w miralax + senna + colace  -Lactulose added with noted improvement by patient.  9. Anemia multifactorial : myelosuppresssion and CRI  Transfuse 1 unit PRBCs 74 y/o M with PMH of HTN, BPH, PTSD, DLD, CKD 3,  and metastatic neuroendocrine tumor of GB ( on chemo therapy, Topotecan) came with sweating , abdominal pain and generalized weakness.    1.  Fever with leukocytosis, initial workup not revealing: now afebrile  - Patient is not neutropenic.  - Cultures negative  - d/c antibiotics    2. Metastatic neuroendocrine cancer of GB:  -Due for topotecan next week.  Will address this once question of fevers and leukocytosis ( likely due to Neulasta)  addressed.    3.  Elevated LFTs: secondary to GB cancer with liver mets  -Elevated alk phos but no bone mets in recent PET scan  -GGT elevated.  C/w hepatic origin.  Monitor LFTs  -No acute pathology on NC CT abdomen and abdominal ultrasound.  May consider contrast imaging if no improvement in LFTs and RANULFO resolves.    4. RANULFO on CKD (Creatinine 2.1 from baseline 1.6)  -Continue IV fluids for now.  Creatinine improved from 2.1 to 1.9.    5. HTN  -C/w toprol + nifedipine    6. BPH  -C/w flomax + finasteride    7. PTSD  -C/w trazadone    8. Constipation:  -C/w miralax + senna + colace  -Lactulose added with noted improvement by patient.  9. Anemia multifactorial : myelosuppresssion and CRI  Transfuse 1 unit PRBCs    Plan : d/c home

## 2018-09-29 NOTE — DISCHARGE NOTE ADULT - ABILITY TO HEAR (WITH HEARING AID OR HEARING APPLIANCE IF NORMALLY USED):
Mildly to Moderately Impaired: difficulty hearing in some environments or speaker may need to increase volume or speak distinctly/no hearing aids

## 2018-09-29 NOTE — DISCHARGE NOTE ADULT - CARE PROVIDER_API CALL
Alonso French), Hematology; Internal Medicine; Medical Oncology  [address] [place] [address]  Phone: [phone]  Fax: [phone]

## 2018-09-29 NOTE — DISCHARGE NOTE ADULT - MEDICATION SUMMARY - MEDICATIONS TO TAKE
I will START or STAY ON the medications listed below when I get home from the hospital:    finasteride 1 mg oral tablet  -- 1 tab(s) by mouth once a day  -- Indication: For BPH (benign prostatic hyperplasia)    aspirin 81 mg oral tablet, chewable  -- 1 tab(s) by mouth once a day  -- Indication: For CAD    oxyCODONE 10 mg oral tablet  -- 1 tab(s) by mouth every 6 hours  -- Indication: For Pain    Flomax 0.4 mg oral capsule  -- 1 cap(s) by mouth once a day  -- Indication: For BPH (benign prostatic hyperplasia)    traZODone 50 mg oral tablet  -- 1 tab(s) by mouth once a day  -- Indication: For PTSD (post-traumatic stress disorder)    Lipitor 10 mg oral tablet  -- 1 tab(s) by mouth once a day  -- Indication: For Elevated cholesterol    Toprol-XL 50 mg oral tablet, extended release  -- 1 tab(s) by mouth once a day  -- Indication: For High blood pressure    Nifedical XL  -- 90 milligram(s) by mouth once a day  -- Indication: For High blood pressure    Senna 8.6 mg oral tablet  -- 1 tab(s) by mouth once a day (at bedtime)  -- Indication: For Constipation    Dulcolax Stool Softener 100 mg oral capsule  -- 1 cap(s) by mouth 2 times a day  -- Indication: For Constipation    Colace 100 mg oral capsule  -- 1 cap(s) by mouth 3 times a day  -- Indication: For Constipation    MiraLax oral powder for reconstitution  -- 1 packet(s) by mouth once a day  -- Indication: For Constipation

## 2018-09-29 NOTE — DISCHARGE NOTE ADULT - HOSPITAL COURSE
74 y/o M with PMH of HTN, BPH, PTSD, DLD, CKD 3,  and metastatic neuroendocrine tumor of GB ( on chemo therapy, Topotecan) came with sweating, abdominal pain and generalized weakness.  As per patient, he took his temp at home and it was 100.1. He has had chronic RUQ abdominal pain, sharp, radiating to back on oxycodone. On presentation to the ED he did not have abdominal pain. His T-max was 100.4 on presentation with vitals being stable (mild hypertesion). His WBC was elevated to 22. CXR was normal, CT A/P did not show any acute changes (just known metastatic disease). Blood cultures taken with urine cultures. He was started on vancomycin and meropenem and ID was consulted. He was seen by ID who recommended switching to IV zosyn and observing for 24 hrs. Blood cultures/urine cultures have been negative to date.      Oncology history: He received 7 cycles of carboplatin and ectoposide in January 2018, then switched to FOLFIRI s/p 13 cycles in July 2018. He was recently switched to Topotecan in July 2018 and received 2 cycles, last one was end of August. He was supposed to receive 3rd cycle on Tuesday 72 y/o M with PMH of HTN, BPH, PTSD, DLD, CKD 3,  and metastatic neuroendocrine tumor of GB ( on chemo therapy, Topotecan) came with sweating, abdominal pain and generalized weakness.  As per patient, he took his temp at home and it was 100.1. He has had chronic RUQ abdominal pain, sharp, radiating to back on oxycodone. On presentation to the ED he did not have abdominal pain. His T-max was 100.4 on presentation with vitals being stable (mild hypertesion). His WBC was elevated to 22. CXR was normal, CT A/P did not show any acute changes (just known metastatic disease). Blood cultures taken with urine cultures. He was started on vancomycin and meropenem and ID was consulted. He was seen by ID who recommended switching to IV zosyn and observing for 24 hrs. Blood cultures/urine cultures have been negative to date. Patient has been     Oncology history: He received 7 cycles of carboplatin and ectoposide in January 2018, then switched to FOLFIRI s/p 13 cycles in July 2018. He was recently switched to Topotecan in July 2018 and received 2 cycles, last one was end of August. He was supposed to receive 3rd cycle on Tuesday 74 y/o M with PMH of HTN, BPH, PTSD, DLD, CKD 3,  and metastatic neuroendocrine tumor of GB ( on chemo therapy, Topotecan) came with sweating, abdominal pain and generalized weakness.  As per patient, he took his temp at home and it was 100.1. He has had chronic RUQ abdominal pain, sharp, radiating to back on oxycodone. On presentation to the ED he did not have abdominal pain. His T-max was 100.4 on presentation with vitals being stable (mild hypertesion). His WBC was elevated to 22. CXR was normal, CT A/P did not show any acute changes (just known metastatic disease). Blood cultures taken with urine cultures. He was started on vancomycin and meropenem and ID was consulted. He was seen by ID who recommended switching to IV zosyn and observing for 24 hrs. Blood cultures/urine cultures have been negative to date. Patient has been afebrile over past 24 hrs (Tmax 100.1) and vitals have been stable.

## 2018-09-29 NOTE — DISCHARGE NOTE ADULT - ADDITIONAL INSTRUCTIONS
Please follow up with Dr. French and your primary care physician after discharge within 1 week of discharge.

## 2018-09-29 NOTE — PROGRESS NOTE ADULT - ATTENDING COMMENTS
Agree with above A/P  Maintain on IV fluids, ABX.  Chemotherapy is due next week. will reassess at that time.  Please send Ca19-9.
He was seen and examined. He feels well. Was watching TV in his chair. Asked to go home.    Will DC home. No antibiotics.    Has follow up next week with Dr. French
Pt seen and examined.  Agree with above A/p  Transfuse 1 unit PRBC.  If he remains afebrile may consider DC home tomorrow.

## 2018-10-02 ENCOUNTER — APPOINTMENT (OUTPATIENT)
Dept: HEMATOLOGY ONCOLOGY | Facility: CLINIC | Age: 73
End: 2018-10-02

## 2018-10-02 LAB
CULTURE RESULTS: SIGNIFICANT CHANGE UP
CULTURE RESULTS: SIGNIFICANT CHANGE UP
SPECIMEN SOURCE: SIGNIFICANT CHANGE UP
SPECIMEN SOURCE: SIGNIFICANT CHANGE UP

## 2018-10-03 DIAGNOSIS — R50.9 FEVER, UNSPECIFIED: ICD-10-CM

## 2018-10-03 DIAGNOSIS — N18.3 CHRONIC KIDNEY DISEASE, STAGE 3 (MODERATE): ICD-10-CM

## 2018-10-03 DIAGNOSIS — K59.00 CONSTIPATION, UNSPECIFIED: ICD-10-CM

## 2018-10-03 DIAGNOSIS — F43.10 POST-TRAUMATIC STRESS DISORDER, UNSPECIFIED: ICD-10-CM

## 2018-10-03 DIAGNOSIS — C7B.02 SECONDARY CARCINOID TUMORS OF LIVER: ICD-10-CM

## 2018-10-03 DIAGNOSIS — D72.829 ELEVATED WHITE BLOOD CELL COUNT, UNSPECIFIED: ICD-10-CM

## 2018-10-03 DIAGNOSIS — C67.8 MALIGNANT NEOPLASM OF OVERLAPPING SITES OF BLADDER: ICD-10-CM

## 2018-10-03 DIAGNOSIS — Z87.891 PERSONAL HISTORY OF NICOTINE DEPENDENCE: ICD-10-CM

## 2018-10-03 DIAGNOSIS — Z86.73 PERSONAL HISTORY OF TRANSIENT ISCHEMIC ATTACK (TIA), AND CEREBRAL INFARCTION WITHOUT RESIDUAL DEFICITS: ICD-10-CM

## 2018-10-03 DIAGNOSIS — N17.9 ACUTE KIDNEY FAILURE, UNSPECIFIED: ICD-10-CM

## 2018-10-03 DIAGNOSIS — C78.89 SECONDARY MALIGNANT NEOPLASM OF OTHER DIGESTIVE ORGANS: ICD-10-CM

## 2018-10-03 DIAGNOSIS — E78.5 HYPERLIPIDEMIA, UNSPECIFIED: ICD-10-CM

## 2018-10-03 DIAGNOSIS — N40.0 BENIGN PROSTATIC HYPERPLASIA WITHOUT LOWER URINARY TRACT SYMPTOMS: ICD-10-CM

## 2018-10-03 DIAGNOSIS — D64.9 ANEMIA, UNSPECIFIED: ICD-10-CM

## 2018-10-03 DIAGNOSIS — I12.9 HYPERTENSIVE CHRONIC KIDNEY DISEASE WITH STAGE 1 THROUGH STAGE 4 CHRONIC KIDNEY DISEASE, OR UNSPECIFIED CHRONIC KIDNEY DISEASE: ICD-10-CM

## 2018-10-04 ENCOUNTER — INBOUND DOCUMENT (OUTPATIENT)
Age: 73
End: 2018-10-04

## 2018-10-04 LAB — ALPHA SUBUNIT SERPL-MCNC: 6.4 NG/ML — HIGH

## 2018-10-10 ENCOUNTER — INPATIENT (INPATIENT)
Facility: HOSPITAL | Age: 73
LOS: 9 days | End: 2018-10-20
Attending: INTERNAL MEDICINE | Admitting: INTERNAL MEDICINE
Payer: OTHER GOVERNMENT

## 2018-10-10 VITALS
HEART RATE: 65 BPM | RESPIRATION RATE: 20 BRPM | DIASTOLIC BLOOD PRESSURE: 56 MMHG | SYSTOLIC BLOOD PRESSURE: 114 MMHG | OXYGEN SATURATION: 100 % | TEMPERATURE: 98 F

## 2018-10-10 PROBLEM — N18.3 CHRONIC KIDNEY DISEASE, STAGE 3 (MODERATE): Chronic | Status: ACTIVE | Noted: 2018-09-27

## 2018-10-10 PROBLEM — C7A.8 OTHER MALIGNANT NEUROENDOCRINE TUMORS: Chronic | Status: ACTIVE | Noted: 2018-09-27

## 2018-10-10 PROBLEM — F43.10 POST-TRAUMATIC STRESS DISORDER, UNSPECIFIED: Chronic | Status: ACTIVE | Noted: 2018-09-27

## 2018-10-10 PROBLEM — E78.5 HYPERLIPIDEMIA, UNSPECIFIED: Chronic | Status: ACTIVE | Noted: 2018-09-27

## 2018-10-10 PROBLEM — N40.0 BENIGN PROSTATIC HYPERPLASIA WITHOUT LOWER URINARY TRACT SYMPTOMS: Chronic | Status: ACTIVE | Noted: 2018-09-27

## 2018-10-10 PROBLEM — I10 ESSENTIAL (PRIMARY) HYPERTENSION: Chronic | Status: ACTIVE | Noted: 2018-09-27

## 2018-10-10 LAB
ALBUMIN SERPL ELPH-MCNC: 2.8 G/DL — LOW (ref 3.5–5.2)
ALP SERPL-CCNC: 1023 U/L — HIGH (ref 30–115)
ALT FLD-CCNC: 140 U/L — HIGH (ref 0–41)
ANION GAP SERPL CALC-SCNC: 23 MMOL/L — HIGH (ref 7–14)
APPEARANCE UR: ABNORMAL
AST SERPL-CCNC: 415 U/L — HIGH (ref 0–41)
BASE EXCESS BLDV CALC-SCNC: -1.9 MMOL/L — SIGNIFICANT CHANGE UP (ref -2–2)
BASE EXCESS BLDV CALC-SCNC: -3.2 MMOL/L — LOW (ref -2–2)
BASOPHILS # BLD AUTO: 0.05 K/UL — SIGNIFICANT CHANGE UP (ref 0–0.2)
BASOPHILS NFR BLD AUTO: 0.2 % — SIGNIFICANT CHANGE UP (ref 0–1)
BILIRUB SERPL-MCNC: 1.9 MG/DL — HIGH (ref 0.2–1.2)
BILIRUB UR-MCNC: NEGATIVE — SIGNIFICANT CHANGE UP
BUN SERPL-MCNC: 69 MG/DL — CRITICAL HIGH (ref 10–20)
CA-I SERPL-SCNC: 1.05 MMOL/L — LOW (ref 1.12–1.3)
CA-I SERPL-SCNC: 1.12 MMOL/L — SIGNIFICANT CHANGE UP (ref 1.12–1.3)
CALCIUM SERPL-MCNC: 8.5 MG/DL — SIGNIFICANT CHANGE UP (ref 8.5–10.1)
CHLORIDE SERPL-SCNC: 94 MMOL/L — LOW (ref 98–110)
CO2 SERPL-SCNC: 18 MMOL/L — SIGNIFICANT CHANGE UP (ref 17–32)
COLOR SPEC: SIGNIFICANT CHANGE UP
CREAT SERPL-MCNC: 3.1 MG/DL — HIGH (ref 0.7–1.5)
DIFF PNL FLD: NEGATIVE — SIGNIFICANT CHANGE UP
EOSINOPHIL # BLD AUTO: 0.01 K/UL — SIGNIFICANT CHANGE UP (ref 0–0.7)
EOSINOPHIL NFR BLD AUTO: 0 % — SIGNIFICANT CHANGE UP (ref 0–8)
EPI CELLS # UR: ABNORMAL /HPF
GAS PNL BLDV: 134 MMOL/L — LOW (ref 136–145)
GAS PNL BLDV: 137 MMOL/L — SIGNIFICANT CHANGE UP (ref 136–145)
GAS PNL BLDV: SIGNIFICANT CHANGE UP
GAS PNL BLDV: SIGNIFICANT CHANGE UP
GLUCOSE SERPL-MCNC: 104 MG/DL — HIGH (ref 70–99)
GLUCOSE UR QL: NEGATIVE MG/DL — SIGNIFICANT CHANGE UP
HCO3 BLDV-SCNC: 21 MMOL/L — LOW (ref 22–29)
HCO3 BLDV-SCNC: 22 MMOL/L — SIGNIFICANT CHANGE UP (ref 22–29)
HCT VFR BLD CALC: 31.8 % — LOW (ref 42–52)
HCT VFR BLDA CALC: 25.5 % — LOW (ref 34–44)
HCT VFR BLDA CALC: 35.4 % — SIGNIFICANT CHANGE UP (ref 34–44)
HGB BLD CALC-MCNC: 11.6 G/DL — LOW (ref 14–18)
HGB BLD CALC-MCNC: 8.3 G/DL — LOW (ref 14–18)
HGB BLD-MCNC: 10.6 G/DL — LOW (ref 14–18)
IMM GRANULOCYTES NFR BLD AUTO: 0.6 % — HIGH (ref 0.1–0.3)
KETONES UR-MCNC: ABNORMAL
LACTATE BLDV-MCNC: 2.5 MMOL/L — HIGH (ref 0.5–1.6)
LACTATE BLDV-MCNC: 4.3 MMOL/L — HIGH (ref 0.5–1.6)
LEUKOCYTE ESTERASE UR-ACNC: ABNORMAL
LYMPHOCYTES # BLD AUTO: 0.96 K/UL — LOW (ref 1.2–3.4)
LYMPHOCYTES # BLD AUTO: 4.1 % — LOW (ref 20.5–51.1)
MCHC RBC-ENTMCNC: 25.8 PG — LOW (ref 27–31)
MCHC RBC-ENTMCNC: 33.3 G/DL — SIGNIFICANT CHANGE UP (ref 32–37)
MCV RBC AUTO: 77.4 FL — LOW (ref 80–94)
MONOCYTES # BLD AUTO: 2.35 K/UL — HIGH (ref 0.1–0.6)
MONOCYTES NFR BLD AUTO: 10.1 % — HIGH (ref 1.7–9.3)
NEUTROPHILS # BLD AUTO: 19.69 K/UL — HIGH (ref 1.4–6.5)
NEUTROPHILS NFR BLD AUTO: 85 % — HIGH (ref 42.2–75.2)
NITRITE UR-MCNC: NEGATIVE — SIGNIFICANT CHANGE UP
NRBC # BLD: 0 /100 WBCS — SIGNIFICANT CHANGE UP (ref 0–0)
PCO2 BLDV: 31 MMHG — LOW (ref 41–51)
PCO2 BLDV: 32 MMHG — LOW (ref 41–51)
PH BLDV: 7.43 — SIGNIFICANT CHANGE UP (ref 7.26–7.43)
PH BLDV: 7.43 — SIGNIFICANT CHANGE UP (ref 7.26–7.43)
PH UR: 6 — SIGNIFICANT CHANGE UP (ref 5–8)
PLATELET # BLD AUTO: 298 K/UL — SIGNIFICANT CHANGE UP (ref 130–400)
PO2 BLDV: 34 MMHG — SIGNIFICANT CHANGE UP (ref 20–40)
PO2 BLDV: 37 MMHG — SIGNIFICANT CHANGE UP (ref 20–40)
POTASSIUM BLDV-SCNC: 4.2 MMOL/L — SIGNIFICANT CHANGE UP (ref 3.3–5.6)
POTASSIUM BLDV-SCNC: 5.3 MMOL/L — SIGNIFICANT CHANGE UP (ref 3.3–5.6)
POTASSIUM SERPL-MCNC: 5.5 MMOL/L — HIGH (ref 3.5–5)
POTASSIUM SERPL-SCNC: 5.5 MMOL/L — HIGH (ref 3.5–5)
PROT SERPL-MCNC: 6.7 G/DL — SIGNIFICANT CHANGE UP (ref 6–8)
PROT UR-MCNC: ABNORMAL MG/DL
RBC # BLD: 4.11 M/UL — LOW (ref 4.7–6.1)
RBC # FLD: 20 % — HIGH (ref 11.5–14.5)
RBC CASTS # UR COMP ASSIST: SIGNIFICANT CHANGE UP /HPF
SAO2 % BLDV: 60 % — SIGNIFICANT CHANGE UP
SAO2 % BLDV: 66 % — SIGNIFICANT CHANGE UP
SODIUM SERPL-SCNC: 135 MMOL/L — SIGNIFICANT CHANGE UP (ref 135–146)
SP GR SPEC: 1.02 — SIGNIFICANT CHANGE UP (ref 1.01–1.03)
UROBILINOGEN FLD QL: 1 MG/DL (ref 0.2–0.2)
WBC # BLD: 23.2 K/UL — HIGH (ref 4.8–10.8)
WBC # FLD AUTO: 23.2 K/UL — HIGH (ref 4.8–10.8)
WBC UR QL: ABNORMAL /HPF

## 2018-10-10 RX ORDER — SODIUM CHLORIDE 9 MG/ML
1000 INJECTION INTRAMUSCULAR; INTRAVENOUS; SUBCUTANEOUS
Qty: 0 | Refills: 0 | Status: DISCONTINUED | OUTPATIENT
Start: 2018-10-10 | End: 2018-10-11

## 2018-10-10 RX ORDER — ONDANSETRON 8 MG/1
4 TABLET, FILM COATED ORAL ONCE
Qty: 0 | Refills: 0 | Status: COMPLETED | OUTPATIENT
Start: 2018-10-10 | End: 2018-10-10

## 2018-10-10 RX ORDER — SENNA PLUS 8.6 MG/1
1 TABLET ORAL AT BEDTIME
Qty: 0 | Refills: 0 | Status: DISCONTINUED | OUTPATIENT
Start: 2018-10-10 | End: 2018-10-19

## 2018-10-10 RX ORDER — FINASTERIDE 5 MG/1
5 TABLET, FILM COATED ORAL DAILY
Qty: 0 | Refills: 0 | Status: DISCONTINUED | OUTPATIENT
Start: 2018-10-10 | End: 2018-10-20

## 2018-10-10 RX ORDER — TAMSULOSIN HYDROCHLORIDE 0.4 MG/1
0.4 CAPSULE ORAL AT BEDTIME
Qty: 0 | Refills: 0 | Status: DISCONTINUED | OUTPATIENT
Start: 2018-10-10 | End: 2018-10-19

## 2018-10-10 RX ORDER — TRAZODONE HCL 50 MG
50 TABLET ORAL DAILY
Qty: 0 | Refills: 0 | Status: DISCONTINUED | OUTPATIENT
Start: 2018-10-10 | End: 2018-10-19

## 2018-10-10 RX ORDER — SODIUM CHLORIDE 9 MG/ML
1000 INJECTION INTRAMUSCULAR; INTRAVENOUS; SUBCUTANEOUS ONCE
Qty: 0 | Refills: 0 | Status: COMPLETED | OUTPATIENT
Start: 2018-10-10 | End: 2018-10-10

## 2018-10-10 RX ORDER — LANOLIN ALCOHOL/MO/W.PET/CERES
5 CREAM (GRAM) TOPICAL AT BEDTIME
Qty: 0 | Refills: 0 | Status: DISCONTINUED | OUTPATIENT
Start: 2018-10-10 | End: 2018-10-10

## 2018-10-10 RX ORDER — HEPARIN SODIUM 5000 [USP'U]/ML
5000 INJECTION INTRAVENOUS; SUBCUTANEOUS EVERY 8 HOURS
Qty: 0 | Refills: 0 | Status: DISCONTINUED | OUTPATIENT
Start: 2018-10-10 | End: 2018-10-15

## 2018-10-10 RX ORDER — ASPIRIN/CALCIUM CARB/MAGNESIUM 324 MG
81 TABLET ORAL DAILY
Qty: 0 | Refills: 0 | Status: DISCONTINUED | OUTPATIENT
Start: 2018-10-10 | End: 2018-10-19

## 2018-10-10 RX ORDER — PANTOPRAZOLE SODIUM 20 MG/1
40 TABLET, DELAYED RELEASE ORAL
Qty: 0 | Refills: 0 | Status: DISCONTINUED | OUTPATIENT
Start: 2018-10-10 | End: 2018-10-19

## 2018-10-10 RX ORDER — DOCUSATE SODIUM 100 MG
100 CAPSULE ORAL THREE TIMES A DAY
Qty: 0 | Refills: 0 | Status: DISCONTINUED | OUTPATIENT
Start: 2018-10-10 | End: 2018-10-19

## 2018-10-10 RX ORDER — NIFEDIPINE 30 MG
90 TABLET, EXTENDED RELEASE 24 HR ORAL DAILY
Qty: 0 | Refills: 0 | Status: DISCONTINUED | OUTPATIENT
Start: 2018-10-10 | End: 2018-10-13

## 2018-10-10 RX ORDER — ATORVASTATIN CALCIUM 80 MG/1
10 TABLET, FILM COATED ORAL AT BEDTIME
Qty: 0 | Refills: 0 | Status: DISCONTINUED | OUTPATIENT
Start: 2018-10-10 | End: 2018-10-16

## 2018-10-10 RX ORDER — METOPROLOL TARTRATE 50 MG
50 TABLET ORAL DAILY
Qty: 0 | Refills: 0 | Status: DISCONTINUED | OUTPATIENT
Start: 2018-10-10 | End: 2018-10-19

## 2018-10-10 RX ORDER — LANOLIN ALCOHOL/MO/W.PET/CERES
5 CREAM (GRAM) TOPICAL ONCE
Qty: 0 | Refills: 0 | Status: COMPLETED | OUTPATIENT
Start: 2018-10-10 | End: 2018-10-10

## 2018-10-10 RX ADMIN — Medication 30 MILLILITER(S): at 13:23

## 2018-10-10 RX ADMIN — ONDANSETRON 4 MILLIGRAM(S): 8 TABLET, FILM COATED ORAL at 13:30

## 2018-10-10 RX ADMIN — SODIUM CHLORIDE 1000 MILLILITER(S): 9 INJECTION INTRAMUSCULAR; INTRAVENOUS; SUBCUTANEOUS at 16:40

## 2018-10-10 RX ADMIN — SODIUM CHLORIDE 1000 MILLILITER(S): 9 INJECTION INTRAMUSCULAR; INTRAVENOUS; SUBCUTANEOUS at 21:24

## 2018-10-10 RX ADMIN — SODIUM CHLORIDE 1000 MILLILITER(S): 9 INJECTION INTRAMUSCULAR; INTRAVENOUS; SUBCUTANEOUS at 13:47

## 2018-10-10 NOTE — H&P ADULT - NSHPLABSRESULTS_GEN_ALL_CORE
10.6   23.20 )-----------( 298      ( 10 Oct 2018 12:02 )             31.8                10-10    135  |  94<L>  |  69<HH>  ----------------------------<  104<H>  5.5<H>   |  18  |  3.1<H>    Ca    8.5      10 Oct 2018 12:02    TPro  6.7  /  Alb  2.8<L>  /  TBili  1.9<H>  /  DBili  x   /  AST  415<H>  /  ALT  140<H>  /  AlkPhos  1023<H>  10-10      < from: CT Abdomen and Pelvis No Cont (10.10.18 @ 17:46) >    1. Since September 27, 2018, increased moderate to large volume   abdominopelvic ascites.    2. Numerous overall unchanged ill-defined bilobar hepatic metastases.    3. Overall unchanged extensive retroperitoneal adenopathy.    4. Stable nodular thickening of the left adrenal gland.    < end of copied text >        < from: Xray Chest 2 Views PA/Lat (10.10.18 @ 14:39) >    Impression:      No radiographic evidence of acute cardiopulmonary disease.    < end of copied text >

## 2018-10-10 NOTE — ED PROVIDER NOTE - OBJECTIVE STATEMENT
This is a 73yoM with metastatic neuroendocrine tumor on chemo injections who presents for abd bloating and discomfort x 5 days. Pt was just discharged from the hospital and reports sx started after discharge.  He initially had constipation after starting oxycontin and reports that despite now having bowel movements every other day, he is having worsening abd distension (It looks like I'm 9 months pregnant).  He denies pain persay but reports severe discomfort and new SOB.  No vomiting but having nausea.    PMH: HTN, BPH, PTSD, DLD, CKD 3, metastatic neuroendocrine tumor of GB (on chemo w/ topotecan)  Meds: includes toprol, nifedipine, topotecan  NKDA  lives at home

## 2018-10-10 NOTE — ED ADULT NURSE NOTE - CHIEF COMPLAINT QUOTE
Patient reports abdominal distention with weakness abdomen firm. no tenderness . hx of bladder CA recently dc from hospital. reports weakness and subjective fever last bm this am no urinary complaints

## 2018-10-10 NOTE — ED PROVIDER NOTE - PHYSICAL EXAMINATION
CONSTITUTIONAL: well developed; well nourished; well appearing in no acute distress  HEAD: normocephalic; atraumatic  EYES: PERRL, no conjunctival injection, no scleral icterus  ENT: no nasal discharge; airway clear.  NECK: supple; non tender. + full passive ROM in all directions  CARD: S1, S2 normal; no murmurs, gallops, or rubs. Regular rate and rhythm  RESP: no wheezes, rales or rhonchi. Good air movement bilaterally without significant accessory muscle use  ABD: soft; distended thomas supraumbilical/epigastric, mild associated tenderness. No rebound, no guarding, no pulsatile abdominal mass  EXT: moving all extremities spontaneously, normal ROM. No clubbing, cyanosis or edema  SKIN: warm and dry, no lesions noted  NEURO: alert, oriented, CN II-XII grossly intact, motor and sensory grossly intact, speech nonslurred, no focal deficits. GCS 15  PSYCH: calm, cooperative, appropriate, good eye contact, logical thought process, no apparent danger to self or others

## 2018-10-10 NOTE — ED ADULT TRIAGE NOTE - CHIEF COMPLAINT QUOTE
Patient reports abdominal distention with weakness abdomen firm. no tenderness . hx of bladder CA recently dc from hospital. reports weakness and subjective fever Patient reports abdominal distention with weakness abdomen firm. no tenderness . hx of bladder CA recently dc from hospital. reports weakness and subjective fever last bm this am no urinary complaints

## 2018-10-10 NOTE — ED PROVIDER NOTE - MEDICAL DECISION MAKING DETAILS
73yoM metastatic neuroendocrine tumor of GB w/ known liver mets presents for abd bloating. No abd pain or fever.  Chronic constipation not recently worsened.  Labs show worsened LFTs and RANULFO on CKD, concerning for hepatorenal syndrome.  CT c/w ascites.  Lactate initially 4.3, improved to 2.5 after 1L IV NS. Pt to receive 2nd L IV NS and to be admitted to medicine for hydration and management of his ascites.

## 2018-10-10 NOTE — H&P ADULT - HISTORY OF PRESENT ILLNESS
This is a 73yoM with PMH of HTN, BPH, PTSD, DLD, CKD 3, metastatic neuroendocrine tumor of GB (on chemo w/ topotecan)  presents for abd bloating and discomfort x 5 days. Pt was just discharged from the hospital and reports sx started after discharge.  He initially had constipation after starting oxycontin and reports that despite now having bowel movements every other day, he is having worsening abd distension (It looks like I'm 9 months pregnant).  He denies pain but reports severe discomfort.

## 2018-10-10 NOTE — H&P ADULT - NSHPOUTPATIENTPROVIDERS_GEN_ALL_CORE
[facility]   Oncology: Dr. Hanson   Pharmacy: [facility]

## 2018-10-10 NOTE — H&P ADULT - ASSESSMENT
This is a 73yoM with PMH of HTN, BPH, PTSD, DLD, CKD 3, metastatic neuroendocrine tumor of GB (on chemo w/ topotecan)  presents for abd bloating and discomfort x 5 days. Pt was just discharged from the hospital and reports sx started after discharge.  He initially had constipation after starting oxycontin and reports that despite now having bowel movements every other day, he is having worsening abd distension (It looks like I'm 9 months pregnant).  He denies pain but reports severe discomfort.       1) Abdominal Distention likely secondary to Moderate to large Ascites   Follow with am with INR   Plan for possible paracentesis tomorrow   No need for urgent Para at this point unless patients status changes over night.     2) RANULFO on CKD   Patient likely volume depleted as he has not had significant po intake in 3 days.   Normal saline at 75/hr for 12 hours only   Follow with Repeat BMP IN am    Follow with Nephrology   Rule out Hepatorenal syndrome     3) DVT Prophylaxis   Heparin sub q     4) GI Prophylaxis   Protonix     5) Disposition   To be discharged once medically cleared.

## 2018-10-10 NOTE — ED ADULT NURSE NOTE - CAS DISCH CONDITION
"              After Visit Summary   2017    Justen Acevedo    MRN: [id number]           Patient Information     Date Of Birth          1943        Visit Information        Provider Department      2017 10:00 AM Contreras Nolasco, DO [facility]        Today's Diagnoses     Balanoposthitis    -  1       Follow-ups after your visit        Follow-up notes from your care team     Return in about 3 weeks (around 2017), or balantitis.      Who to contact     If you have questions or need follow up information about today's clinic visit or your schedule please contact [facility] directly at [phone].  Normal or non-critical lab and imaging results will be communicated to you by MyChart, letter or phone within 4 business days after the clinic has received the results. If you do not hear from us within 7 days, please contact the clinic through MyChart or phone. If you have a critical or abnormal lab result, we will notify you by phone as soon as possible.  Submit refill requests through [employer] or call your pharmacy and they will forward the refill request to us. Please allow 3 business days for your refill to be completed.          Additional Information About Your Visit        MyChart Information     [employer] lets you send messages to your doctor, view your test results, renew your prescriptions, schedule appointments and more. To sign up, go to [email or web address] . Click on \"Log in\" on the left side of the screen, which will take you to the Welcome page. Then click on \"Sign up Now\" on the right side of the page.     You will be asked to enter the access code listed below, as well as some personal information. Please follow the directions to create your username and password.     Your access code is: [email or web address]  Expires: 8/15/2017 11:09 AM     Your access code will  in 90 days. If you need help or a new code, please call your [facility] or " [phone].        Care EveryWhere ID     This is your Care EveryWhere ID. This could be used by other organizations to access your [place] medical records  [id number]        Your Vitals Were     Pulse Temperature Respirations Pulse Oximetry BMI (Body Mass Index)       56 97.3  F (36.3  C) (Tympanic) 20 92% 31.83 kg/m2        Blood Pressure from Last 3 Encounters:   06/08/17 122/76   05/17/17 132/80   01/16/17 110/60    Weight from Last 3 Encounters:   06/08/17 225 lb (102.1 kg)   05/17/17 227 lb (103 kg)   01/16/17 222 lb 3.2 oz (100.8 kg)              Today, you had the following     No orders found for display         Today's Medication Changes          These changes are accurate as of: 6/8/17 10:16 AM.  If you have any questions, ask your nurse or doctor.               Start taking these medicines.        Dose/Directions    triamcinolone 0.1 % ointment   Commonly known as:  KENALOG   Used for:  Balanoposthitis   Started by:  Contreras Nolasco DO        Apply sparingly to affected area three times daily for 14 days.   Quantity:  80 g   Refills:  0            Where to get your medicines      Some of these will need a paper prescription and others can be bought over the counter.  Ask your nurse if you have questions.     Bring a paper prescription for each of these medications     triamcinolone 0.1 % ointment                Primary Care Provider Office Phone # Fax #    Contreras Nolasco [phone]       [facility] [place] [address] [place] [address] [place]  [facility] [id number]        Thank you!     Thank you for choosing [facility]  for your care. Our goal is always to provide you with excellent care. Hearing back from our patients is one way we can continue to improve our services. Please take a few minutes to complete the written survey that you may receive in the mail after your visit with us. Thank you!             Your Updated Medication List - Protect others around you:  Learn how to safely use, store and throw away your medicines at www.disposemymeds.org.          This list is accurate as of: 6/8/17 10:16 AM.  Always use your most recent med list.                   Brand Name Dispense Instructions for use    ASPIRIN PO      Take 81 mg by mouth daily       budesonide-formoterol 160-4.5 MCG/ACT Inhaler    SYMBICORT    1 Inhaler    Inhale 2 puffs into the lungs 2 times daily       cetirizine 10 MG tablet    zyrTEC    90 tablet    Take 1 tablet (10 mg) by mouth every evening       fluticasone 50 MCG/ACT spray    FLONASE    16 g    Spray 2 sprays into both nostrils daily       guaiFENesin 600 MG 12 hr tablet    MUCINEX    60 tablet    Take 2 tablets (1,200 mg) by mouth 2 times daily as needed for congestion       hydrocortisone 1 % ointment     50 g    Apply sparingly to affected area two times daily for 14 days.       ipratropium - albuterol 0.5 mg/2.5 mg/3 mL 0.5-2.5 (3) MG/3ML neb solution    DUONEB    360 mL    Take 1 vial (3 mLs) by nebulization 2 times daily       lisinopril 5 MG tablet    PRINIVIL/ZESTRIL     Take 5 mg by mouth daily.       metoprolol 25 MG tablet    LOPRESSOR     Take 1 tablet by mouth 2 times daily.       Multi-vitamin Tabs tablet      Take 1 tablet by mouth daily.       order for DME     1 each    Equipment being ordered: Ruma Max Nebulizer kit       simvastatin 10 MG tablet    ZOCOR     Take 1 tablet by mouth At Bedtime.       triamcinolone 0.1 % ointment    KENALOG    80 g    Apply sparingly to affected area three times daily for 14 days.          Stable

## 2018-10-10 NOTE — H&P ADULT - NSHPPHYSICALEXAM_GEN_ALL_CORE
PHYSICAL EXAM:  GENERAL: NAD, speaks in full sentences, no signs of respiratory distress  HEAD:  Atraumatic, Normocephalic  EYES: EOMI, PERRLA, conjunctiva and sclera clear  NECK: Supple, No JVD  CHEST/LUNG: slight crackles noted bilaterally.   HEART: s1 s2 no murmur   ABDOMEN: Soft however moderately distended   EXTREMITIES:  2+ Peripheral Pulses, No cyanosis or edema  PSYCH: AAOx3  NEUROLOGY: non-focal  SKIN: No rashes or lesions

## 2018-10-10 NOTE — ED ADULT NURSE NOTE - OBJECTIVE STATEMENT
pt presents with abdominal discomfort after being admitted recently with the same symptoms. pt states he feels weakness.

## 2018-10-10 NOTE — ED PROVIDER NOTE - NS ED ROS FT
Constitutional: no fevers/chills, no sick contacts  Eyes: No visual changes, eye pain or discharge. No photophobia  ENMT: No hearing changes, pain, discharge or infections. No sore throat or drooling.  Neck:  No neck pain or stiffness. No limited ROM  Cardiac: No SOB or edema. No chest pain with exertion.  Respiratory: No cough or respiratory distress. No hemoptysis. No history of asthma or RAD  GI: + nausea, no vomiting, diarrhea, + abdominal pain  : No dysuria, frequency or burning. No discharge  MS: No myalgia, muscle weakness, joint pain or back pain  Neuro: No headache or weakness. No LOC  Skin: No skin rash  Endo: no diabetes or thyroid dysfunction  Heme: no abnormal bleeding or clotting  Except as documented in the HPI, all other systems are negative

## 2018-10-11 LAB
ALBUMIN SERPL ELPH-MCNC: 2.7 G/DL — LOW (ref 3.5–5.2)
ALP SERPL-CCNC: 938 U/L — HIGH (ref 30–115)
ALT FLD-CCNC: 140 U/L — HIGH (ref 0–41)
ANION GAP SERPL CALC-SCNC: 19 MMOL/L — HIGH (ref 7–14)
APTT BLD: 28.4 SEC — SIGNIFICANT CHANGE UP (ref 27–39.2)
AST SERPL-CCNC: 404 U/L — HIGH (ref 0–41)
BASOPHILS # BLD AUTO: 0.05 K/UL — SIGNIFICANT CHANGE UP (ref 0–0.2)
BASOPHILS NFR BLD AUTO: 0.2 % — SIGNIFICANT CHANGE UP (ref 0–1)
BILIRUB SERPL-MCNC: 2.2 MG/DL — HIGH (ref 0.2–1.2)
BUN SERPL-MCNC: 73 MG/DL — CRITICAL HIGH (ref 10–20)
CALCIUM SERPL-MCNC: 8.2 MG/DL — LOW (ref 8.5–10.1)
CHLORIDE SERPL-SCNC: 98 MMOL/L — SIGNIFICANT CHANGE UP (ref 98–110)
CO2 SERPL-SCNC: 19 MMOL/L — SIGNIFICANT CHANGE UP (ref 17–32)
CREAT ?TM UR-MCNC: 20 MG/DL — SIGNIFICANT CHANGE UP
CREAT SERPL-MCNC: 3.4 MG/DL — HIGH (ref 0.7–1.5)
EOSINOPHIL # BLD AUTO: 0.04 K/UL — SIGNIFICANT CHANGE UP (ref 0–0.7)
EOSINOPHIL NFR BLD AUTO: 0.2 % — SIGNIFICANT CHANGE UP (ref 0–8)
GLUCOSE SERPL-MCNC: 76 MG/DL — SIGNIFICANT CHANGE UP (ref 70–99)
HCT VFR BLD CALC: 25.4 % — LOW (ref 42–52)
HGB BLD-MCNC: 8.6 G/DL — LOW (ref 14–18)
IMM GRANULOCYTES NFR BLD AUTO: 0.7 % — HIGH (ref 0.1–0.3)
INR BLD: 1.42 RATIO — HIGH (ref 0.65–1.3)
LYMPHOCYTES # BLD AUTO: 1.02 K/UL — LOW (ref 1.2–3.4)
LYMPHOCYTES # BLD AUTO: 4.7 % — LOW (ref 20.5–51.1)
MCHC RBC-ENTMCNC: 25.7 PG — LOW (ref 27–31)
MCHC RBC-ENTMCNC: 33.9 G/DL — SIGNIFICANT CHANGE UP (ref 32–37)
MCV RBC AUTO: 76 FL — LOW (ref 80–94)
MONOCYTES # BLD AUTO: 2.4 K/UL — HIGH (ref 0.1–0.6)
MONOCYTES NFR BLD AUTO: 11 % — HIGH (ref 1.7–9.3)
NEUTROPHILS # BLD AUTO: 18.2 K/UL — HIGH (ref 1.4–6.5)
NEUTROPHILS NFR BLD AUTO: 83.2 % — HIGH (ref 42.2–75.2)
NRBC # BLD: 0 /100 WBCS — SIGNIFICANT CHANGE UP (ref 0–0)
PLATELET # BLD AUTO: 280 K/UL — SIGNIFICANT CHANGE UP (ref 130–400)
POTASSIUM SERPL-MCNC: 4.9 MMOL/L — SIGNIFICANT CHANGE UP (ref 3.5–5)
POTASSIUM SERPL-SCNC: 4.9 MMOL/L — SIGNIFICANT CHANGE UP (ref 3.5–5)
PROT SERPL-MCNC: 6.1 G/DL — SIGNIFICANT CHANGE UP (ref 6–8)
PROTHROM AB SERPL-ACNC: 15.3 SEC — HIGH (ref 9.95–12.87)
RBC # BLD: 3.34 M/UL — LOW (ref 4.7–6.1)
RBC # FLD: 19.9 % — HIGH (ref 11.5–14.5)
SODIUM SERPL-SCNC: 136 MMOL/L — SIGNIFICANT CHANGE UP (ref 135–146)
SODIUM UR-SCNC: 20 MMOL/L — SIGNIFICANT CHANGE UP
WBC # BLD: 21.86 K/UL — HIGH (ref 4.8–10.8)
WBC # FLD AUTO: 21.86 K/UL — HIGH (ref 4.8–10.8)

## 2018-10-11 RX ORDER — LANOLIN ALCOHOL/MO/W.PET/CERES
3 CREAM (GRAM) TOPICAL AT BEDTIME
Qty: 0 | Refills: 0 | Status: DISCONTINUED | OUTPATIENT
Start: 2018-10-11 | End: 2018-10-19

## 2018-10-11 RX ORDER — TRAMADOL HYDROCHLORIDE 50 MG/1
25 TABLET ORAL
Qty: 0 | Refills: 0 | Status: DISCONTINUED | OUTPATIENT
Start: 2018-10-11 | End: 2018-10-11

## 2018-10-11 RX ORDER — DOCUSATE SODIUM 100 MG
100 CAPSULE ORAL DAILY
Qty: 0 | Refills: 0 | Status: DISCONTINUED | OUTPATIENT
Start: 2018-10-11 | End: 2018-10-12

## 2018-10-11 RX ORDER — CHLORHEXIDINE GLUCONATE 213 G/1000ML
1 SOLUTION TOPICAL
Qty: 0 | Refills: 0 | Status: DISCONTINUED | OUTPATIENT
Start: 2018-10-11 | End: 2018-10-20

## 2018-10-11 RX ORDER — ALBUMIN HUMAN 25 %
50 VIAL (ML) INTRAVENOUS
Qty: 0 | Refills: 0 | Status: COMPLETED | OUTPATIENT
Start: 2018-10-11 | End: 2018-10-12

## 2018-10-11 RX ORDER — OCTREOTIDE ACETATE 200 UG/ML
100 INJECTION, SOLUTION INTRAVENOUS; SUBCUTANEOUS
Qty: 0 | Refills: 0 | Status: DISCONTINUED | OUTPATIENT
Start: 2018-10-11 | End: 2018-10-19

## 2018-10-11 RX ORDER — TRAMADOL HYDROCHLORIDE 50 MG/1
25 TABLET ORAL
Qty: 0 | Refills: 0 | Status: DISCONTINUED | OUTPATIENT
Start: 2018-10-11 | End: 2018-10-15

## 2018-10-11 RX ORDER — SENNA PLUS 8.6 MG/1
1 TABLET ORAL DAILY
Qty: 0 | Refills: 0 | Status: DISCONTINUED | OUTPATIENT
Start: 2018-10-11 | End: 2018-10-19

## 2018-10-11 RX ORDER — SODIUM CHLORIDE 9 MG/ML
1000 INJECTION INTRAMUSCULAR; INTRAVENOUS; SUBCUTANEOUS
Qty: 0 | Refills: 0 | Status: DISCONTINUED | OUTPATIENT
Start: 2018-10-11 | End: 2018-10-11

## 2018-10-11 RX ADMIN — Medication 5 MILLIGRAM(S): at 00:19

## 2018-10-11 RX ADMIN — Medication 50 MILLILITER(S): at 23:06

## 2018-10-11 RX ADMIN — Medication 50 MILLILITER(S): at 22:02

## 2018-10-11 RX ADMIN — HEPARIN SODIUM 5000 UNIT(S): 5000 INJECTION INTRAVENOUS; SUBCUTANEOUS at 21:20

## 2018-10-11 RX ADMIN — Medication 100 MILLIGRAM(S): at 12:29

## 2018-10-11 RX ADMIN — Medication 81 MILLIGRAM(S): at 11:20

## 2018-10-11 RX ADMIN — SENNA PLUS 1 TABLET(S): 8.6 TABLET ORAL at 12:30

## 2018-10-11 RX ADMIN — Medication 50 MILLILITER(S): at 23:39

## 2018-10-11 RX ADMIN — TRAMADOL HYDROCHLORIDE 25 MILLIGRAM(S): 50 TABLET ORAL at 12:28

## 2018-10-11 RX ADMIN — FINASTERIDE 5 MILLIGRAM(S): 5 TABLET, FILM COATED ORAL at 11:20

## 2018-10-11 RX ADMIN — Medication 50 MILLIGRAM(S): at 11:20

## 2018-10-11 RX ADMIN — SENNA PLUS 1 TABLET(S): 8.6 TABLET ORAL at 21:20

## 2018-10-11 RX ADMIN — Medication 90 MILLIGRAM(S): at 05:37

## 2018-10-11 RX ADMIN — Medication 100 MILLIGRAM(S): at 21:20

## 2018-10-11 RX ADMIN — Medication 100 MILLIGRAM(S): at 05:37

## 2018-10-11 RX ADMIN — TAMSULOSIN HYDROCHLORIDE 0.4 MILLIGRAM(S): 0.4 CAPSULE ORAL at 21:20

## 2018-10-11 RX ADMIN — Medication 3 MILLIGRAM(S): at 21:20

## 2018-10-11 RX ADMIN — Medication 50 MILLIGRAM(S): at 05:37

## 2018-10-11 RX ADMIN — TRAMADOL HYDROCHLORIDE 25 MILLIGRAM(S): 50 TABLET ORAL at 23:39

## 2018-10-11 RX ADMIN — TRAMADOL HYDROCHLORIDE 25 MILLIGRAM(S): 50 TABLET ORAL at 22:50

## 2018-10-11 RX ADMIN — PANTOPRAZOLE SODIUM 40 MILLIGRAM(S): 20 TABLET, DELAYED RELEASE ORAL at 06:02

## 2018-10-11 RX ADMIN — ATORVASTATIN CALCIUM 10 MILLIGRAM(S): 80 TABLET, FILM COATED ORAL at 21:20

## 2018-10-11 RX ADMIN — TRAMADOL HYDROCHLORIDE 25 MILLIGRAM(S): 50 TABLET ORAL at 12:58

## 2018-10-11 RX ADMIN — SODIUM CHLORIDE 75 MILLILITER(S): 9 INJECTION INTRAMUSCULAR; INTRAVENOUS; SUBCUTANEOUS at 01:07

## 2018-10-11 NOTE — CONSULT NOTE ADULT - ASSESSMENT
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    #RANULFO on CKD 3 (Baseline Cr 1.6) likely pre-renal (Hepatorenal Type 1)   -Cr 3.4 from 3.1; K+ 5.5 noted   -Urine Na 20; Urine Cr 20   -Stop IVF  -Perform 24 hour albumin challenge  -Check urine for casts  -Avoid nephrotoxic drugs  -Continue monitoring CBC and BMP       #Ascites likely malignant   -Diagnostic and therapeutic IR paracentesis scheduled for 10/12     #Metastatic neuroendocrine tumor of GM  -Last dose of topotecan September 2018.  -f/u on Heme-Onc recommendations    #Leukocytosis with neutrophilia likely drug related    - WBC 21.86; Neutrophils 83%  - Clarify patient last dose of neulasta

## 2018-10-11 NOTE — PROGRESS NOTE ADULT - ASSESSMENT
ARACELIS RODRIGUEZ 73y Male  MRN#: [id number]   CODE STATUS: FULL      SUBJECTIVE  Patient is a 73y old Male who presents with a chief complaint of Abdominal Distention (11 Oct 2018 17:16)    Currently admitted to medicine with the primary diagnosis of Malignant ascites       Today is hospital day 1d, and this morning he is laying in bed comfortably and reports no overnight events.  Today he complains of abdominal pain and would really like to have some relief.  He wants to be taped today but he knows it will likely be tomorrow.  He has no other complaints today.       Present Today:           Godinez Catheter (x)No/ ()Yes?   Indication:             Central Line (x)No/ ()Yes?   Indication:          IV Fluids (x)No/ ()Yes? 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  BPH (benign prostatic hyperplasia)  PTSD (post-traumatic stress disorder)  Hypertension  Dyslipidemia  CKD (chronic kidney disease), stage III  No significant past surgical history    ALLERGIES:  No Known Allergies    MEDICATIONS:  STANDING MEDICATIONS  albumin human 25% IVPB 50 milliLiter(s) IV Intermittent every 1 hour  aspirin  chewable 81 milliGRAM(s) Oral daily  atorvastatin 10 milliGRAM(s) Oral at bedtime  chlorhexidine 4% Liquid 1 Application(s) Topical <User Schedule>  docusate sodium 100 milliGRAM(s) Oral daily  docusate sodium 100 milliGRAM(s) Oral three times a day  finasteride 5 milliGRAM(s) Oral daily  heparin  Injectable 5000 Unit(s) SubCutaneous every 8 hours  melatonin 3 milliGRAM(s) Oral at bedtime  metoprolol succinate ER 50 milliGRAM(s) Oral daily  NIFEdipine XL 90 milliGRAM(s) Oral daily  octreotide  Injectable 100 MICROGram(s) SubCutaneous two times a day  pantoprazole    Tablet 40 milliGRAM(s) Oral before breakfast  senna 1 Tablet(s) Oral daily  senna 1 Tablet(s) Oral at bedtime  tamsulosin 0.4 milliGRAM(s) Oral at bedtime  traZODone 50 milliGRAM(s) Oral daily    PRN MEDICATIONS  traMADol 25 milliGRAM(s) Oral two times a day PRN      VITAL SIGNS: Last 24 Hours  T(C): 35.7 (11 Oct 2018 14:05), Max: 36.4 (10 Oct 2018 22:11)  T(F): 96.3 (11 Oct 2018 14:05), Max: 97.6 (10 Oct 2018 22:11)  HR: 77 (11 Oct 2018 14:05) (69 - 82)  BP: 129/60 (11 Oct 2018 14:05) (116/58 - 148/67)  BP(mean): --  RR: 18 (11 Oct 2018 14:05) (18 - 19)  SpO2: 99% (10 Oct 2018 22:12) (99% - 99%)    LABS:                        8.6    21.86 )-----------( 280      ( 11 Oct 2018 08:10 )             25.4     10-11    136  |  98  |  73<HH>  ----------------------------<  76  4.9   |  19  |  3.4<H>    Ca    8.2<L>      11 Oct 2018 08:10    TPro  6.1  /  Alb  2.7<L>  /  TBili  2.2<H>  /  DBili  x   /  AST  404<H>  /  ALT  140<H>  /  AlkPhos  938<H>  10-11    PT/INR - ( 11 Oct 2018 08:10 )   PT: 15.30 sec;   INR: 1.42 ratio         PTT - ( 11 Oct 2018 08:10 )  PTT:28.4 sec  Urinalysis Basic - ( 10 Oct 2018 12:02 )    Color: Dark Yellow / Appearance: Cloudy / S.020 / pH: x  Gluc: x / Ketone: Trace  / Bili: Negative / Urobili: 1.0 mg/dL   Blood: x / Protein: Trace mg/dL / Nitrite: Negative   Leuk Esterase: Small / RBC: 1-2 /HPF / WBC 6-10 /HPF   Sq Epi: x / Non Sq Epi: Occasional /HPF / Bacteria: x        RADIOLOGY:    IMPRESSION:     1. Since 2018, increased moderate to large volume   abdominopelvic ascites.    2. Numerous overall unchanged ill-defined bilobar hepatic metastases.    3. Overall unchanged extensive retroperitoneal adenopathy.    4. Stable nodular thickening of the left adrenal gland.          PHYSICAL EXAM:    GENERAL: NAD, well-developed, AAOx3  HEENT:  Atraumatic, Normocephalic. 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 Pt was just discharged from the hospital and reports sx started after discharge.  He initially had constipation after starting oxycontin and reports that despite now having bowel movements every other day, he is having worsening abd distension (It looks like I'm 9 months pregnant).  He denies pain but reports severe discomfort. 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 Pt was just discharged from the hospital and reports sx started after discharge.  He initially had constipation after starting oxycontin and reports that despite now having bowel movements every other day, he is having worsening abd distension (It looks like I'm 9 months pregnant).  He denies pain but reports severe discomfort.     #) Ascites  -Possibly Malignant, hx of heavy alcohol use  -CT A/P- moderate to large volume ascites noted  -IR-diagnostic and therapeutic paracentesis scheduled for tomorrow 10/12   -f/u cytology, labs     # ) RANULFO on CKDIII  -Cr 3.4 today   -did not respond to IVF, likely Hepatorenal Syndrome  -Octrotide BID started  -Albumin IV initiated, patient weights 85 kg, dose for HRS 1 gm/kg, pharmacy consulted and as each bag contains 12.5 grams he will need 6 bags at 50cc of 25% albumin.     DVT Prophylaxis- Heparin sub q   GI Prophylaxis -Protonix     # ) Disposition   To be discharged once medically cleared.

## 2018-10-11 NOTE — CONSULT NOTE ADULT - SUBJECTIVE AND OBJECTIVE BOX
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  Patient was recently discharged on 2018 for weakness and SOB.  Patient states "discomfort" with abdominal distention but denies abdominal pain.  The SOB is due to abdominal distention and not exertional in nature.  Patient denies chest pain or palpitations.  Patient states nausea but denies vomiting.  Patient states intermittent constipation and diarrhea over the past week.  Patient also states decreased oral intake.  He has had decreased urinary output and denies dysuria, urgency, or hematuria.  Patient denies any cough, fever, or chills.  As per patient his last chemotherapy dose was 1 month ago.           In ED, patient was afrebile, BP was 114/56, HR 65, RR 20.  Patient is s/p 2 x 1L bolus of NS and currently on 75ml/hr NS.        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  BPH (benign prostatic hyperplasia)  PTSD (post-traumatic stress disorder)  Hypertension  Dyslipidemia  CKD (chronic kidney disease), stage III  No significant past surgical history    Allergies:  No Known Allergies    Home Medications Reviewed  Hospital Medications:   MEDICATIONS  (STANDING):  aspirin  chewable 81 milliGRAM(s) Oral daily  atorvastatin 10 milliGRAM(s) Oral at bedtime  docusate sodium 100 milliGRAM(s) Oral three times a day  finasteride 5 milliGRAM(s) Oral daily  heparin  Injectable 5000 Unit(s) SubCutaneous every 8 hours  metoprolol succinate ER 50 milliGRAM(s) Oral daily  NIFEdipine XL 90 milliGRAM(s) Oral daily  pantoprazole    Tablet 40 milliGRAM(s) Oral before breakfast  senna 1 Tablet(s) Oral at bedtime  sodium chloride 0.9%. 1000 milliLiter(s) (75 mL/Hr) IV Continuous <Continuous>  tamsulosin 0.4 milliGRAM(s) Oral at bedtime  traZODone 50 milliGRAM(s) Oral daily      SOCIAL HISTORY:  Denies ETOH,Smoking,   FAMILY HISTORY:  Family history of Alzheimer's disease (Mother)        REVIEW OF SYSTEMS:  CONSTITUTIONAL: No weakness, fevers or chills  EYES/ENT: No visual changes;  No vertigo or throat pain   NECK: No pain or stiffness  RESPIRATORY: No cough, wheezing, hemoptysis  CARDIOVASCULAR: No chest pain or palpitations.  GASTROINTESTINAL: See HPI   GENITOURINARY: See HPI   NEUROLOGICAL: No numbness or weakness  SKIN: No itching, burning, rashes, or lesions   VASCULAR: Chronic bilateral lower extremity dependent edema.       VITALS:  T(F): 97.5 (10-11-18 @ 05:40), Max: 97.6 (10-10-18 @ 22:11)  HR: 69 (10-11-18 @ 05:40)  BP: 116/58 (10-11-18 @ 05:40)  RR: 18 (10-11-18 @ 05:40)  SpO2: 99% (10-10-18 @ 22:12)    Height (cm): 175.26 (10-10 @ 22:11)    I&O's Detail        PHYSICAL EXAM:  Constitutional: Looks uncomfortable   HEENT: anicteric sclera, oropharynx clear, MMM  Neck: No JVD  Respiratory: CTAB, no wheezes, rales or rhonchi  Cardiovascular: S1, S2, RRR,   Gastrointestinal: BS+, extremely distended   Extremities: No cyanosis or clubbing. Bilateral + 1 pitting edema to mid leg   Neurological: A/O x 3,   Psychiatric: Normal mood, normal affect  : No CVA tenderness. No curtis.   Skin: No rashes      LABS:  10-10    135  |  94<L>  |  69<HH>  ----------------------------<  104<H>  5.5<H>   |  18  |  3.1<H>    Ca    8.5      10 Oct 2018 12:02    TPro  6.7  /  Alb  2.8<L>  /  TBili  1.9<H>  /  DBili      /  AST  415<H>  /  ALT  140<H>  /  AlkPhos  1023<H>  10-10    Creatinine Trend: 3.1 <--, 1.7 <--, 1.6 <--, 1.9 <--, 2.1 <--                        8.6    21.86 )-----------( 280      ( 11 Oct 2018 08:10 )             25.4     Urine Studies:  Urinalysis Basic - ( 10 Oct 2018 12:02 )    Color: Dark Yellow / Appearance: Cloudy / S.020 / pH:   Gluc:  / Ketone: Trace  / Bili: Negative / Urobili: 1.0 mg/dL   Blood:  / Protein: Trace mg/dL / Nitrite: Negative   Leuk Esterase: Small / RBC: 1-2 /HPF / WBC 6-10 /HPF   Sq Epi:  / Non Sq Epi: Occasional /HPF / Bacteria:             RADIOLOGY & ADDITIONAL STUDIES: NEPHROLOGY CONSULTATION NOTE    This is a 73 year old  male with PMHx of CKD 3 (Baseline Cr 1.6), 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  Patient was recently discharged on 2018 for weakness and SOB.  Patient states "discomfort" with abdominal distention but denies abdominal pain.  The SOB is due to abdominal distention and not exertional in nature.  Patient denies chest pain or palpitations.  Patient states nausea but denies vomiting.  Patient states intermittent constipation and diarrhea over the past week.  Patient also states decreased oral intake.  He has had decreased urinary output and denies dysuria, urgency, or hematuria.  Patient denies any cough, fever, or chills.  As per patient his last chemotherapy dose was 1 month ago.           In ED, patient was afrebile, BP was 114/56, HR 65, RR 20.  Patient is s/p 2 x 1L bolus of NS and currently on 75ml/hr NS.        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  BPH (benign prostatic hyperplasia)  PTSD (post-traumatic stress disorder)  Hypertension  Dyslipidemia  CKD (chronic kidney disease), stage III  No significant past surgical history    Allergies:  No Known Allergies    Home Medications Reviewed  Hospital Medications:   MEDICATIONS  (STANDING):  aspirin  chewable 81 milliGRAM(s) Oral daily  atorvastatin 10 milliGRAM(s) Oral at bedtime  docusate sodium 100 milliGRAM(s) Oral three times a day  finasteride 5 milliGRAM(s) Oral daily  heparin  Injectable 5000 Unit(s) SubCutaneous every 8 hours  metoprolol succinate ER 50 milliGRAM(s) Oral daily  NIFEdipine XL 90 milliGRAM(s) Oral daily  pantoprazole    Tablet 40 milliGRAM(s) Oral before breakfast  senna 1 Tablet(s) Oral at bedtime  sodium chloride 0.9%. 1000 milliLiter(s) (75 mL/Hr) IV Continuous <Continuous>  tamsulosin 0.4 milliGRAM(s) Oral at bedtime  traZODone 50 milliGRAM(s) Oral daily      SOCIAL HISTORY:  Denies ETOH,Smoking,   FAMILY HISTORY:  Family history of Alzheimer's disease (Mother)        REVIEW OF SYSTEMS:  CONSTITUTIONAL: No weakness, fevers or chills  EYES/ENT: No visual changes;  No vertigo or throat pain   NECK: No pain or stiffness  RESPIRATORY: No cough, wheezing, hemoptysis  CARDIOVASCULAR: No chest pain or palpitations.  GASTROINTESTINAL: See HPI   GENITOURINARY: See HPI   NEUROLOGICAL: No numbness or weakness  SKIN: No itching, burning, rashes, or lesions   VASCULAR: Chronic bilateral lower extremity dependent edema.       VITALS:  T(F): 97.5 (10-11-18 @ 05:40), Max: 97.6 (10-10-18 @ 22:11)  HR: 69 (10-11-18 @ 05:40)  BP: 116/58 (10-11-18 @ 05:40)  RR: 18 (10-11-18 @ 05:40)  SpO2: 99% (10-10-18 @ 22:12)    Height (cm): 175.26 (10-10 @ 22:11)    I&O's Detail        PHYSICAL EXAM:  Constitutional: Looks uncomfortable   HEENT: anicteric sclera, oropharynx clear, MMM  Neck: No JVD  Respiratory: CTAB, no wheezes, rales or rhonchi  Cardiovascular: S1, S2, RRR,   Gastrointestinal: BS+, extremely distended   Extremities: No cyanosis or clubbing. Bilateral + 1 pitting edema to mid leg   Neurological: A/O x 3,   Psychiatric: Normal mood, normal affect  : No CVA tenderness. No curtis.   Skin: No rashes      LABS:  10-10    135  |  94<L>  |  69<HH>  ----------------------------<  104<H>  5.5<H>   |  18  |  3.1<H>    Ca    8.5      10 Oct 2018 12:02    TPro  6.7  /  Alb  2.8<L>  /  TBili  1.9<H>  /  DBili      /  AST  415<H>  /  ALT  140<H>  /  AlkPhos  1023<H>  10-10    Creatinine Trend: 3.1 <--, 1.7 <--, 1.6 <--, 1.9 <--, 2.1 <--                        8.6    21.86 )-----------( 280      ( 11 Oct 2018 08:10 )             25.4     Urine Studies:  Urinalysis Basic - ( 10 Oct 2018 12:02 )    Color: Dark Yellow / Appearance: Cloudy / S.020 / pH:   Gluc:  / Ketone: Trace  / Bili: Negative / Urobili: 1.0 mg/dL   Blood:  / Protein: Trace mg/dL / Nitrite: Negative   Leuk Esterase: Small / RBC: 1-2 /HPF / WBC 6-10 /HPF   Sq Epi:  / Non Sq Epi: Occasional /HPF / Bacteria:     Sodium, Random Urine (10.11.18 @ 10:24)    Sodium, Random Urine: 20.0: mmoL/L    Creatinine, Random Urine: 20: mg/dL (10.11.18 @ 10:24)              RADIOLOGY & ADDITIONAL STUDIES:    < from: Xray Chest 2 Views PA/Lat (10.10.18 @ 14:39) >  Impression:      No radiographic evidence of acute cardiopulmonary disease.    < end of copied text >  < from: CT Abdomen and Pelvis No Cont (10.10.18 @ 17:46) >  IMPRESSION:     1. Since 2018, increased moderate to large volume   abdominopelvic ascites.    2. Numerous overall unchanged ill-defined bilobar hepatic metastases.    3. Overall unchanged extensive retroperitoneal adenopathy.      < end of copied text >

## 2018-10-11 NOTE — CONSULT NOTE ADULT - ATTENDING COMMENTS
Pt seen and examined  Above note reviwed, discussed w/ student  RANULFO on CKD3 in setting of neuroendocrine tumor   Now w/ rise in CR not responding to IVF  No obviosu reason for ATN, liekly HRS (though BP is higher than expected)  Less likely abdominal compartment syndrome, has ascites but not particularly tense. planned for paracentesis     would start treatment with a combination of  octreotide, and albumin   octreotide   mg TID  IV albumin x two days   trend Cr and UOP

## 2018-10-11 NOTE — CONSULT NOTE ADULT - SUBJECTIVE AND OBJECTIVE BOX
Patient is a 73y old  Male who presents with a chief complaint of Abdominal Distention (11 Oct 2018 13:28)      HPI:  This is a 73yoM with PMH of HTN, BPH, PTSD, DLD, CKD 3, metastatic neuroendocrine tumor of GB (on chemo w/ topotecan) presents for abd bloating and discomfort x 5 days. Pt was just discharged from the hospital and reports sx started after discharge.  He initially had constipation after starting oxycontin and reports that despite now having bowel movements every other day, he is having worsening abd distension (It looks like I'm 9 months pregnant).  He denies pain but reports severe discomfort. (10 Oct 2018 23:08)     Patient complains of abdominal distention/discomfort and insomnia/anxiety.  He states that he had a normal bowel movement this morning.    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  BPH (benign prostatic hyperplasia)  PTSD (post-traumatic stress disorder)  Hypertension  Dyslipidemia  CKD (chronic kidney disease), stage III  No significant past surgical history    FAMILY HISTORY:  Family history of Alzheimer's disease (Mother)    MEDICATIONS  (STANDING):  aspirin  chewable 81 milliGRAM(s) Oral daily  atorvastatin 10 milliGRAM(s) Oral at bedtime  chlorhexidine 4% Liquid 1 Application(s) Topical <User Schedule>  docusate sodium 100 milliGRAM(s) Oral daily  docusate sodium 100 milliGRAM(s) Oral three times a day  finasteride 5 milliGRAM(s) Oral daily  heparin  Injectable 5000 Unit(s) SubCutaneous every 8 hours  metoprolol succinate ER 50 milliGRAM(s) Oral daily  NIFEdipine XL 90 milliGRAM(s) Oral daily  pantoprazole    Tablet 40 milliGRAM(s) Oral before breakfast  senna 1 Tablet(s) Oral daily  senna 1 Tablet(s) Oral at bedtime  sodium chloride 0.9%. 1000 milliLiter(s) (75 mL/Hr) IV Continuous <Continuous>  tamsulosin 0.4 milliGRAM(s) Oral at bedtime  traZODone 50 milliGRAM(s) Oral daily    MEDICATIONS  (PRN):  traMADol 25 milliGRAM(s) Oral two times a day PRN Moderate Pain (4 - 6)    Height (cm): 175.26 (10-10-18 @ 22:11)  Allergies    No Known Allergies    Intolerances    Vital Signs Last 24 Hrs  T(C): 35.7 (11 Oct 2018 14:05), Max: 36.4 (10 Oct 2018 22:11)  T(F): 96.3 (11 Oct 2018 14:05), Max: 97.6 (10 Oct 2018 22:11)  HR: 77 (11 Oct 2018 14:05) (69 - 82)  BP: 129/60 (11 Oct 2018 14:05) (116/58 - 148/67)  BP(mean): --  RR: 18 (11 Oct 2018 14:05) (18 - 20)  SpO2: 99% (10 Oct 2018 22:12) (99% - 100%)    PHYSICAL EXAM  Constitutional: Looks uncomfortable   HEENT: anicteric sclera  Respiratory: CTA B/L  Cardiovascular: S1, S2, RRR,   Gastrointestinal: BS+, extremely distended   Extremities: No cyanosis or clubbing. B ilateral pitting edema to mid leg   Neurological: AAO x 4   Psychiatric: Normal mood, normal affect   Skin: L-chest chemo port site appears clean and dry      LABS:                          8.6    21.86 )-----------( 280      ( 11 Oct 2018 08:10 )             25.4         Mean Cell Volume : 76.0 fL  Mean Cell Hemoglobin : 25.7 pg  Mean Cell Hemoglobin Concentration : 33.9 g/dL  Auto Neutrophil # : 18.20 K/uL  Auto Lymphocyte # : 1.02 K/uL  Auto Monocyte # : 2.40 K/uL  Auto Eosinophil # : 0.04 K/uL  Auto Basophil # : 0.05 K/uL  Auto Neutrophil % : 83.2 %  Auto Lymphocyte % : 4.7 %  Auto Monocyte % : 11.0 %  Auto Eosinophil % : 0.2 %  Auto Basophil % : 0.2 %      Serial CBC's  10-11 @ 08:10  Hct-25.4 / Hgb-8.6 / Plat-280 / RBC-3.34 / WBC-21.86  Serial CBC's  10-10 @ 12:02  Hct-31.8 / Hgb-10.6 / Plat-298 / RBC-4.11 / WBC-23.20      10-11    136  |  98  |  73<HH>  ----------------------------<  76  4.9   |  19  |  3.4<H>    Ca    8.2<L>      11 Oct 2018 08:10    TPro  6.1  /  Alb  2.7<L>  /  TBili  2.2<H>  /  DBili  x   /  AST  404<H>  /  ALT  140<H>  /  AlkPhos  938<H>  10-11      PT/INR - ( 11 Oct 2018 08:10 )   PT: 15.30 sec;   INR: 1.42 ratio         PTT - ( 11 Oct 2018 08:10 )  PTT:28.4 sec    RADIOLOGY & ADDITIONAL STUDIES:  < from: CT Abdomen and Pelvis No Cont (10.10.18 @ 17:46) >  IMPRESSION:     1. Since September 27, 2018, increased moderate to large volume   abdominopelvic ascites.    2. Numerous overall unchanged ill-defined bilobar hepatic metastases.    3. Overall unchanged extensive retroperitoneal adenopathy.    4. Stable nodular thickening of the left adrenal gland.        < end of copied text >    < from: Xray Chest 2 Views PA/Lat (10.10.18 @ 14:39) >  Impression:      No radiographic evidence of acute cardiopulmonary disease.        < end of copied text >    < from: CT Abdomen and Pelvis No Cont (09.27.18 @ 03:42) >  IMPRESSION:     Multiple ill-defined hypoattenuating hepatic lesions are again   identified, better evaluated on prior PET/CT dated 7/23/2018 consistent   with patient's known metastatic disease.    Multiple upper abdominal lymph nodes are again identified as with   metastatic disease.    Mild amount of abdominopelvic ascites.    No obstruction or free air noted.      < end of copied text > Patient is a 73y old  Male who presents with a chief complaint of Abdominal Distention (11 Oct 2018 13:28)      HPI:  This is a 73yoM with PMH of HTN, BPH, PTSD, DLD, CKD 3, metastatic neuroendocrine tumor of GB (on chemo w/ topotecan) presents for abd bloating and discomfort x 5 days. Pt was just discharged from the hospital and reports sx started after discharge.  He initially had constipation after starting oxycontin and reports that despite now having bowel movements every other day, he is having worsening abd distension (It looks like I'm 9 months pregnant).  He denies pain but reports severe discomfort. (10 Oct 2018 23:08)     Patient complains of abdominal distention/discomfort and insomnia/anxiety.  He states that he had a normal bowel movement this morning.    Oncologic HPI: Patient received 7 cycles of carboplatin and etoposide initiated in January 2018.  He then switched to FOLFIRI and was s/p 13 cycles in July 2018.  He was switched to Topotecan in July 2018 and received 2 cycles, last one was end of August 2018. He was supposed to receive 3rd cycle in late September 2018.     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  BPH (benign prostatic hyperplasia)  PTSD (post-traumatic stress disorder)  Hypertension  Dyslipidemia  CKD (chronic kidney disease), stage III  No significant past surgical history    FAMILY HISTORY:  Family history of Alzheimer's disease (Mother)    MEDICATIONS  (STANDING):  aspirin  chewable 81 milliGRAM(s) Oral daily  atorvastatin 10 milliGRAM(s) Oral at bedtime  chlorhexidine 4% Liquid 1 Application(s) Topical <User Schedule>  docusate sodium 100 milliGRAM(s) Oral daily  docusate sodium 100 milliGRAM(s) Oral three times a day  finasteride 5 milliGRAM(s) Oral daily  heparin  Injectable 5000 Unit(s) SubCutaneous every 8 hours  metoprolol succinate ER 50 milliGRAM(s) Oral daily  NIFEdipine XL 90 milliGRAM(s) Oral daily  pantoprazole    Tablet 40 milliGRAM(s) Oral before breakfast  senna 1 Tablet(s) Oral daily  senna 1 Tablet(s) Oral at bedtime  sodium chloride 0.9%. 1000 milliLiter(s) (75 mL/Hr) IV Continuous <Continuous>  tamsulosin 0.4 milliGRAM(s) Oral at bedtime  traZODone 50 milliGRAM(s) Oral daily    MEDICATIONS  (PRN):  traMADol 25 milliGRAM(s) Oral two times a day PRN Moderate Pain (4 - 6)    Height (cm): 175.26 (10-10-18 @ 22:11)  Allergies    No Known Allergies    Intolerances    Vital Signs Last 24 Hrs  T(C): 35.7 (11 Oct 2018 14:05), Max: 36.4 (10 Oct 2018 22:11)  T(F): 96.3 (11 Oct 2018 14:05), Max: 97.6 (10 Oct 2018 22:11)  HR: 77 (11 Oct 2018 14:05) (69 - 82)  BP: 129/60 (11 Oct 2018 14:05) (116/58 - 148/67)  BP(mean): --  RR: 18 (11 Oct 2018 14:05) (18 - 20)  SpO2: 99% (10 Oct 2018 22:12) (99% - 100%)    PHYSICAL EXAM  Constitutional: Looks uncomfortable   HEENT: anicteric sclera  Respiratory: CTA B/L  Cardiovascular: S1, S2, RRR,   Gastrointestinal: BS+, extremely distended   Extremities: No cyanosis or clubbing. B ilateral pitting edema to mid leg   Neurological: AAO x 4   Psychiatric: Normal mood, normal affect   Skin: L-chest chemo port site appears clean and dry      LABS:                          8.6    21.86 )-----------( 280      ( 11 Oct 2018 08:10 )             25.4         Mean Cell Volume : 76.0 fL  Mean Cell Hemoglobin : 25.7 pg  Mean Cell Hemoglobin Concentration : 33.9 g/dL  Auto Neutrophil # : 18.20 K/uL  Auto Lymphocyte # : 1.02 K/uL  Auto Monocyte # : 2.40 K/uL  Auto Eosinophil # : 0.04 K/uL  Auto Basophil # : 0.05 K/uL  Auto Neutrophil % : 83.2 %  Auto Lymphocyte % : 4.7 %  Auto Monocyte % : 11.0 %  Auto Eosinophil % : 0.2 %  Auto Basophil % : 0.2 %      Serial CBC's  10-11 @ 08:10  Hct-25.4 / Hgb-8.6 / Plat-280 / RBC-3.34 / WBC-21.86  Serial CBC's  10-10 @ 12:02  Hct-31.8 / Hgb-10.6 / Plat-298 / RBC-4.11 / WBC-23.20      10-11    136  |  98  |  73<HH>  ----------------------------<  76  4.9   |  19  |  3.4<H>    Ca    8.2<L>      11 Oct 2018 08:10    TPro  6.1  /  Alb  2.7<L>  /  TBili  2.2<H>  /  DBili  x   /  AST  404<H>  /  ALT  140<H>  /  AlkPhos  938<H>  10-11      PT/INR - ( 11 Oct 2018 08:10 )   PT: 15.30 sec;   INR: 1.42 ratio         PTT - ( 11 Oct 2018 08:10 )  PTT:28.4 sec    RADIOLOGY & ADDITIONAL STUDIES:  < from: CT Abdomen and Pelvis No Cont (10.10.18 @ 17:46) >  IMPRESSION:     1. Since September 27, 2018, increased moderate to large volume   abdominopelvic ascites.    2. Numerous overall unchanged ill-defined bilobar hepatic metastases.    3. Overall unchanged extensive retroperitoneal adenopathy.    4. Stable nodular thickening of the left adrenal gland.        < end of copied text >    < from: Xray Chest 2 Views PA/Lat (10.10.18 @ 14:39) >  Impression:      No radiographic evidence of acute cardiopulmonary disease.        < end of copied text >    < from: CT Abdomen and Pelvis No Cont (09.27.18 @ 03:42) >  IMPRESSION:     Multiple ill-defined hypoattenuating hepatic lesions are again   identified, better evaluated on prior PET/CT dated 7/23/2018 consistent   with patient's known metastatic disease.    Multiple upper abdominal lymph nodes are again identified as with   metastatic disease.    Mild amount of abdominopelvic ascites.    No obstruction or free air noted.      < end of copied text > Patient is a 73y old  Male who presents with a chief complaint of Abdominal Distention (11 Oct 2018 13:28)      HPI:  This is a 73yoM with PMH of HTN, BPH, PTSD, DLD, CKD 3, metastatic neuroendocrine tumor of GB (on chemo w/ topotecan) presents for abd bloating and discomfort x 5 days. Pt was just discharged from the hospital and reports sx started after discharge.  He initially had constipation after starting oxycontin and reports that despite now having bowel movements every other day, he is having worsening abd distension (It looks like I'm 9 months pregnant).  He denies pain but reports severe discomfort. (10 Oct 2018 23:08)     Patient complains of abdominal distention/discomfort and insomnia/anxiety.  He states that he had a normal bowel movement this morning.    Oncologic HPI: Patient received 7 cycles of carboplatin and etoposide initiated in January 2018.  He then switched to FOLFIRI and was s/p 13 cycles in July 2018.  He was switched to Topotecan in July 2018 and received 2 cycles, last one was end of August 2018. He was supposed to receive 3rd cycle in late September 2018.     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  BPH (benign prostatic hyperplasia)  PTSD (post-traumatic stress disorder)  Hypertension  Dyslipidemia  CKD (chronic kidney disease), stage III  No significant past surgical history    FAMILY HISTORY:  Family history of Alzheimer's disease (Mother)    MEDICATIONS  (STANDING):  aspirin  chewable 81 milliGRAM(s) Oral daily  atorvastatin 10 milliGRAM(s) Oral at bedtime  chlorhexidine 4% Liquid 1 Application(s) Topical <User Schedule>  docusate sodium 100 milliGRAM(s) Oral daily  docusate sodium 100 milliGRAM(s) Oral three times a day  finasteride 5 milliGRAM(s) Oral daily  heparin  Injectable 5000 Unit(s) SubCutaneous every 8 hours  metoprolol succinate ER 50 milliGRAM(s) Oral daily  NIFEdipine XL 90 milliGRAM(s) Oral daily  pantoprazole    Tablet 40 milliGRAM(s) Oral before breakfast  senna 1 Tablet(s) Oral daily  senna 1 Tablet(s) Oral at bedtime  sodium chloride 0.9%. 1000 milliLiter(s) (75 mL/Hr) IV Continuous <Continuous>  tamsulosin 0.4 milliGRAM(s) Oral at bedtime  traZODone 50 milliGRAM(s) Oral daily    MEDICATIONS  (PRN):  traMADol 25 milliGRAM(s) Oral two times a day PRN Moderate Pain (4 - 6)    Height (cm): 175.26 (10-10-18 @ 22:11)  Allergies    No Known Allergies    Intolerances    Vital Signs Last 24 Hrs  T(C): 35.7 (11 Oct 2018 14:05), Max: 36.4 (10 Oct 2018 22:11)  T(F): 96.3 (11 Oct 2018 14:05), Max: 97.6 (10 Oct 2018 22:11)  HR: 77 (11 Oct 2018 14:05) (69 - 82)  BP: 129/60 (11 Oct 2018 14:05) (116/58 - 148/67)  BP(mean): --  RR: 18 (11 Oct 2018 14:05) (18 - 20)  SpO2: 99% (10 Oct 2018 22:12) (99% - 100%)    PHYSICAL EXAM  Constitutional: Looks uncomfortable   HEENT: anicteric sclera  Respiratory: CTA B/L  Cardiovascular: S1, S2, RRR,   Gastrointestinal: BS+, extremely distended, ASCITES +, RUQ mass   Extremities: No cyanosis or clubbing. B ilateral pitting edema to mid leg   Neurological: AAO x 4   Psychiatric: Normal mood, normal affect   Skin: L-chest chemo port site appears clean and dry      LABS:                          8.6    21.86 )-----------( 280      ( 11 Oct 2018 08:10 )             25.4         Mean Cell Volume : 76.0 fL  Mean Cell Hemoglobin : 25.7 pg  Mean Cell Hemoglobin Concentration : 33.9 g/dL  Auto Neutrophil # : 18.20 K/uL  Auto Lymphocyte # : 1.02 K/uL  Auto Monocyte # : 2.40 K/uL  Auto Eosinophil # : 0.04 K/uL  Auto Basophil # : 0.05 K/uL  Auto Neutrophil % : 83.2 %  Auto Lymphocyte % : 4.7 %  Auto Monocyte % : 11.0 %  Auto Eosinophil % : 0.2 %  Auto Basophil % : 0.2 %      Serial CBC's  10-11 @ 08:10  Hct-25.4 / Hgb-8.6 / Plat-280 / RBC-3.34 / WBC-21.86  Serial CBC's  10-10 @ 12:02  Hct-31.8 / Hgb-10.6 / Plat-298 / RBC-4.11 / WBC-23.20      10-11    136  |  98  |  73<HH>  ----------------------------<  76  4.9   |  19  |  3.4<H>    Ca    8.2<L>      11 Oct 2018 08:10    TPro  6.1  /  Alb  2.7<L>  /  TBili  2.2<H>  /  DBili  x   /  AST  404<H>  /  ALT  140<H>  /  AlkPhos  938<H>  10-11      PT/INR - ( 11 Oct 2018 08:10 )   PT: 15.30 sec;   INR: 1.42 ratio         PTT - ( 11 Oct 2018 08:10 )  PTT:28.4 sec    RADIOLOGY & ADDITIONAL STUDIES:  < from: CT Abdomen and Pelvis No Cont (10.10.18 @ 17:46) >  IMPRESSION:     1. Since September 27, 2018, increased moderate to large volume   abdominopelvic ascites.    2. Numerous overall unchanged ill-defined bilobar hepatic metastases.    3. Overall unchanged extensive retroperitoneal adenopathy.    4. Stable nodular thickening of the left adrenal gland.        < end of copied text >    < from: Xray Chest 2 Views PA/Lat (10.10.18 @ 14:39) >  Impression:      No radiographic evidence of acute cardiopulmonary disease.        < end of copied text >    < from: CT Abdomen and Pelvis No Cont (09.27.18 @ 03:42) >  IMPRESSION:     Multiple ill-defined hypoattenuating hepatic lesions are again   identified, better evaluated on prior PET/CT dated 7/23/2018 consistent   with patient's known metastatic disease.    Multiple upper abdominal lymph nodes are again identified as with   metastatic disease.    Mild amount of abdominopelvic ascites.    No obstruction or free air noted.      < end of copied text >

## 2018-10-11 NOTE — CONSULT NOTE ADULT - SUBJECTIVE AND OBJECTIVE BOX
INTERVENTIONAL RADIOLOGY CONSULT:     Procedure Requested: diagnostic and therapeutic paracentesis    HPI:  This is a 73yoM with PMH of HTN, BPH, PTSD, DLD, CKD 3, metastatic neuroendocrine tumor of GB (on chemo w/ topotecan)  presents for abd bloating and discomfort x 5 days. Pt was just discharged from the hospital and reports sx started after discharge.  He initially had constipation after starting oxycontin and reports that despite now having bowel movements every other day, he is having worsening abd distension (It looks like I'm 9 months pregnant).  He denies pain but reports severe discomfort. 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  BPH (benign prostatic hyperplasia)  PTSD (post-traumatic stress disorder)  Hypertension  Dyslipidemia  CKD (chronic kidney disease), stage III  No significant past surgical history      MEDICATIONS  (STANDING):  aspirin  chewable 81 milliGRAM(s) Oral daily  atorvastatin 10 milliGRAM(s) Oral at bedtime  chlorhexidine 4% Liquid 1 Application(s) Topical <User Schedule>  docusate sodium 100 milliGRAM(s) Oral daily  docusate sodium 100 milliGRAM(s) Oral three times a day  finasteride 5 milliGRAM(s) Oral daily  heparin  Injectable 5000 Unit(s) SubCutaneous every 8 hours  metoprolol succinate ER 50 milliGRAM(s) Oral daily  NIFEdipine XL 90 milliGRAM(s) Oral daily  pantoprazole    Tablet 40 milliGRAM(s) Oral before breakfast  senna 1 Tablet(s) Oral daily  senna 1 Tablet(s) Oral at bedtime  sodium chloride 0.9%. 1000 milliLiter(s) (75 mL/Hr) IV Continuous <Continuous>  tamsulosin 0.4 milliGRAM(s) Oral at bedtime  traZODone 50 milliGRAM(s) Oral daily    MEDICATIONS  (PRN):  traMADol 25 milliGRAM(s) Oral two times a day PRN Moderate Pain (4 - 6)      Allergies    No Known Allergies    Intolerances        Social History:   Smoking: Yes [ ]  No [ ]   ______pk yrs  ETOH  Yes [ ]  No [ ]  Social [ ]  DRUGS:  Yes [ ]  No [ ]  if so what______________    FAMILY HISTORY:  Family history of Alzheimer's disease (Mother)      Physical Exam:   Vital Signs Last 24 Hrs  T(C): 36.4 (11 Oct 2018 05:40), Max: 36.4 (10 Oct 2018 22:11)  T(F): 97.5 (11 Oct 2018 05:40), Max: 97.6 (10 Oct 2018 22:11)  HR: 69 (11 Oct 2018 05:40) (69 - 82)  BP: 116/58 (11 Oct 2018 05:40) (116/58 - 148/67)  BP(mean): --  RR: 18 (11 Oct 2018 05:40) (18 - 20)  SpO2: 99% (10 Oct 2018 22:12) (99% - 100%)      Labs:                         8.6    21.86 )-----------( 280      ( 11 Oct 2018 08:10 )             25.4     10-11    136  |  98  |  73<HH>  ----------------------------<  76  4.9   |  19  |  3.4<H>    Ca    8.2<L>      11 Oct 2018 08:10    TPro  6.1  /  Alb  2.7<L>  /  TBili  2.2<H>  /  DBili  x   /  AST  404<H>  /  ALT  140<H>  /  AlkPhos  938<H>  10-11    PT/INR - ( 11 Oct 2018 08:10 )   PT: 15.30 sec;   INR: 1.42 ratio         PTT - ( 11 Oct 2018 08:10 )  PTT:28.4 sec    Pertinent labs:                      8.6    21.86 )-----------( 280      ( 11 Oct 2018 08:10 )             25.4       10-11    136  |  98  |  73<HH>  ----------------------------<  76  4.9   |  19  |  3.4<H>    Ca    8.2<L>      11 Oct 2018 08:10    TPro  6.1  /  Alb  2.7<L>  /  TBili  2.2<H>  /  DBili  x   /  AST  404<H>  /  ALT  140<H>  /  AlkPhos  938<H>  10-11      PT/INR - ( 11 Oct 2018 08:10 )   PT: 15.30 sec;   INR: 1.42 ratio         PTT - ( 11 Oct 2018 08:10 )  PTT:28.4 sec    Radiology & Additional Studies:     < from: CT Abdomen and Pelvis No Cont (10.10.18 @ 17:46) >  IMPRESSION:     1. Since September 27, 2018, increased moderate to large volume   abdominopelvic ascites.    2. Numerous overall unchanged ill-defined bilobar hepatic metastases.    3. Overall unchanged extensive retroperitoneal adenopathy.    4. Stable nodular thickening of the left adrenal gland.    < end of copied text >      Radiology imaging reviewed.       ASSESSMENT/ PLAN:   - consulted for diagnostic and therapeutic paracentesis  - patient has history of metastatic neuroendocrine tumor of GB - admitted for severe abdominal discomfort  - CT study reviewed - moderate to large volume ascites noted  - diagnostic and therapeutic paracentesis scheduled for tomorrow 10/12  - please put in all diagnostic orders in [facility] prior to procedure      Thank you for the courtesy of this consult, please call [phone] with any further questions.

## 2018-10-11 NOTE — CONSULT NOTE ADULT - ASSESSMENT
73yoM with PMH of HTN, BPH, PTSD, DLD, CKD 3, metastatic neuroendocrine tumor of GB (on chemo w/ topotecan) presents for abdominal bloating and discomfort x 5 days.    1. Abdominal bloating and discomfort-abdominal CT showed moderate to large volume abdominopelvic ascites.  Patient planned for diagnostic and therapeutic paracentesis by IR tomorrow.  2. RANULFO on CKD 3-concern for hepatorenal syndrome, especially in light of problem 1.  Nephrology is following the patient.  Follow up results of ascitic fluid studies. 72 yo male with PMH of HTN, BPH, PTSD, DLD, CKD 3, metastatic neuroendocrine tumor of GB (on chemo w/ topotecan) presents for abdominal bloating and discomfort x 5 days.    1. Abdominal bloating and discomfort-abdominal CT showed moderate to large volume abdominopelvic ascites.  Patient planned for diagnostic and therapeutic paracentesis by IR tomorrow.  2. RANULFO on CKD 3-concern for hepatorenal syndrome, especially in light of problem 1.  Nephrology is following the patient.  Follow up results of ascitic fluid studies.

## 2018-10-12 ENCOUNTER — RESULT REVIEW (OUTPATIENT)
Age: 73
End: 2018-10-12

## 2018-10-12 LAB
ANION GAP SERPL CALC-SCNC: 28 MMOL/L — HIGH (ref 7–14)
APTT BLD: 30.4 SEC — SIGNIFICANT CHANGE UP (ref 27–39.2)
BASE EXCESS BLDA CALC-SCNC: -7.5 MMOL/L — LOW (ref -2–2)
BASOPHILS # BLD AUTO: 0.05 K/UL — SIGNIFICANT CHANGE UP (ref 0–0.2)
BASOPHILS NFR BLD AUTO: 0.2 % — SIGNIFICANT CHANGE UP (ref 0–1)
BLD GP AB SCN SERPL QL: SIGNIFICANT CHANGE UP
BUN SERPL-MCNC: 81 MG/DL — CRITICAL HIGH (ref 10–20)
CALCIUM SERPL-MCNC: 8.7 MG/DL — SIGNIFICANT CHANGE UP (ref 8.5–10.1)
CHLORIDE SERPL-SCNC: 95 MMOL/L — LOW (ref 98–110)
CO2 SERPL-SCNC: 14 MMOL/L — LOW (ref 17–32)
CREAT SERPL-MCNC: 3.7 MG/DL — HIGH (ref 0.7–1.5)
EOSINOPHIL # BLD AUTO: 0.07 K/UL — SIGNIFICANT CHANGE UP (ref 0–0.7)
EOSINOPHIL NFR BLD AUTO: 0.3 % — SIGNIFICANT CHANGE UP (ref 0–8)
GLUCOSE SERPL-MCNC: 61 MG/DL — LOW (ref 70–99)
HCO3 BLDA-SCNC: 17 MMOL/L — LOW (ref 23–27)
HCT VFR BLD CALC: 24.5 % — LOW (ref 42–52)
HGB BLD-MCNC: 8.4 G/DL — LOW (ref 14–18)
HOROWITZ INDEX BLDA+IHG-RTO: 21 — SIGNIFICANT CHANGE UP
IMM GRANULOCYTES NFR BLD AUTO: 0.8 % — HIGH (ref 0.1–0.3)
INR BLD: 1.51 RATIO — HIGH (ref 0.65–1.3)
LYMPHOCYTES # BLD AUTO: 0.86 K/UL — LOW (ref 1.2–3.4)
LYMPHOCYTES # BLD AUTO: 3.7 % — LOW (ref 20.5–51.1)
MCHC RBC-ENTMCNC: 25.9 PG — LOW (ref 27–31)
MCHC RBC-ENTMCNC: 34.3 G/DL — SIGNIFICANT CHANGE UP (ref 32–37)
MCV RBC AUTO: 75.6 FL — LOW (ref 80–94)
MONOCYTES # BLD AUTO: 2.17 K/UL — HIGH (ref 0.1–0.6)
MONOCYTES NFR BLD AUTO: 9.4 % — HIGH (ref 1.7–9.3)
NEUTROPHILS # BLD AUTO: 19.78 K/UL — HIGH (ref 1.4–6.5)
NEUTROPHILS NFR BLD AUTO: 85.6 % — HIGH (ref 42.2–75.2)
NRBC # BLD: 0 /100 WBCS — SIGNIFICANT CHANGE UP (ref 0–0)
PCO2 BLDA: 29 MMHG — LOW (ref 38–42)
PH BLDA: 7.37 — LOW (ref 7.38–7.42)
PLATELET # BLD AUTO: 255 K/UL — SIGNIFICANT CHANGE UP (ref 130–400)
PO2 BLDA: 76 MMHG — LOW (ref 78–95)
POTASSIUM SERPL-MCNC: 4.9 MMOL/L — SIGNIFICANT CHANGE UP (ref 3.5–5)
POTASSIUM SERPL-SCNC: 4.9 MMOL/L — SIGNIFICANT CHANGE UP (ref 3.5–5)
PROTHROM AB SERPL-ACNC: 16.2 SEC — HIGH (ref 9.95–12.87)
RBC # BLD: 3.24 M/UL — LOW (ref 4.7–6.1)
RBC # FLD: 20.4 % — HIGH (ref 11.5–14.5)
SAO2 % BLDA: 95 % — SIGNIFICANT CHANGE UP (ref 94–98)
SODIUM SERPL-SCNC: 137 MMOL/L — SIGNIFICANT CHANGE UP (ref 135–146)
TYPE + AB SCN PNL BLD: SIGNIFICANT CHANGE UP
WBC # BLD: 23.11 K/UL — HIGH (ref 4.8–10.8)
WBC # FLD AUTO: 23.11 K/UL — HIGH (ref 4.8–10.8)

## 2018-10-12 RX ORDER — ONDANSETRON 8 MG/1
4 TABLET, FILM COATED ORAL ONCE
Qty: 0 | Refills: 0 | Status: COMPLETED | OUTPATIENT
Start: 2018-10-12 | End: 2018-10-12

## 2018-10-12 RX ORDER — ONDANSETRON 8 MG/1
2 TABLET, FILM COATED ORAL ONCE
Qty: 0 | Refills: 0 | Status: COMPLETED | OUTPATIENT
Start: 2018-10-12 | End: 2018-10-12

## 2018-10-12 RX ORDER — ALBUMIN HUMAN 25 %
50 VIAL (ML) INTRAVENOUS
Qty: 0 | Refills: 0 | Status: COMPLETED | OUTPATIENT
Start: 2018-10-12 | End: 2018-10-12

## 2018-10-12 RX ORDER — SODIUM BICARBONATE 1 MEQ/ML
650 SYRINGE (ML) INTRAVENOUS THREE TIMES A DAY
Qty: 0 | Refills: 0 | Status: DISCONTINUED | OUTPATIENT
Start: 2018-10-12 | End: 2018-10-14

## 2018-10-12 RX ADMIN — Medication 50 MILLILITER(S): at 22:55

## 2018-10-12 RX ADMIN — Medication 50 MILLILITER(S): at 01:07

## 2018-10-12 RX ADMIN — SENNA PLUS 1 TABLET(S): 8.6 TABLET ORAL at 21:21

## 2018-10-12 RX ADMIN — CHLORHEXIDINE GLUCONATE 1 APPLICATION(S): 213 SOLUTION TOPICAL at 05:52

## 2018-10-12 RX ADMIN — HEPARIN SODIUM 5000 UNIT(S): 5000 INJECTION INTRAVENOUS; SUBCUTANEOUS at 21:21

## 2018-10-12 RX ADMIN — Medication 100 MILLIGRAM(S): at 05:52

## 2018-10-12 RX ADMIN — Medication 3 MILLIGRAM(S): at 21:21

## 2018-10-12 RX ADMIN — FINASTERIDE 5 MILLIGRAM(S): 5 TABLET, FILM COATED ORAL at 14:04

## 2018-10-12 RX ADMIN — Medication 50 MILLIGRAM(S): at 05:51

## 2018-10-12 RX ADMIN — HEPARIN SODIUM 5000 UNIT(S): 5000 INJECTION INTRAVENOUS; SUBCUTANEOUS at 14:05

## 2018-10-12 RX ADMIN — OCTREOTIDE ACETATE 100 MICROGRAM(S): 200 INJECTION, SOLUTION INTRAVENOUS; SUBCUTANEOUS at 05:52

## 2018-10-12 RX ADMIN — Medication 90 MILLIGRAM(S): at 05:51

## 2018-10-12 RX ADMIN — ATORVASTATIN CALCIUM 10 MILLIGRAM(S): 80 TABLET, FILM COATED ORAL at 21:21

## 2018-10-12 RX ADMIN — TAMSULOSIN HYDROCHLORIDE 0.4 MILLIGRAM(S): 0.4 CAPSULE ORAL at 21:21

## 2018-10-12 RX ADMIN — Medication 50 MILLILITER(S): at 00:00

## 2018-10-12 RX ADMIN — Medication 50 MILLILITER(S): at 22:56

## 2018-10-12 RX ADMIN — ONDANSETRON 4 MILLIGRAM(S): 8 TABLET, FILM COATED ORAL at 19:44

## 2018-10-12 RX ADMIN — Medication 81 MILLIGRAM(S): at 14:04

## 2018-10-12 RX ADMIN — PANTOPRAZOLE SODIUM 40 MILLIGRAM(S): 20 TABLET, DELAYED RELEASE ORAL at 06:29

## 2018-10-12 RX ADMIN — Medication 50 MILLIGRAM(S): at 14:05

## 2018-10-12 RX ADMIN — SENNA PLUS 1 TABLET(S): 8.6 TABLET ORAL at 14:07

## 2018-10-12 RX ADMIN — Medication 50 MILLILITER(S): at 22:53

## 2018-10-12 RX ADMIN — Medication 100 MILLIGRAM(S): at 21:22

## 2018-10-12 RX ADMIN — OCTREOTIDE ACETATE 100 MICROGRAM(S): 200 INJECTION, SOLUTION INTRAVENOUS; SUBCUTANEOUS at 17:05

## 2018-10-12 RX ADMIN — Medication 650 MILLIGRAM(S): at 21:21

## 2018-10-12 RX ADMIN — Medication 50 MILLILITER(S): at 02:07

## 2018-10-12 RX ADMIN — Medication 50 MILLILITER(S): at 22:54

## 2018-10-12 NOTE — PROGRESS NOTE ADULT - SUBJECTIVE AND OBJECTIVE BOX
ARACELIS RODRIGUEZ  73y  Male      Patient is a 73y old  Male who presents with a chief complaint of Abdominal Distention (12 Oct 2018 13:03)      INTERVAL HPI/OVERNIGHT EVENTS: feels much better after paracentesis. urinating a bit earlier. no other complaints      REVIEW OF SYSTEMS:  as above  All other review of systems negative    T(C): 36.4 (10-12-18 @ 18:21), Max: 36.4 (10-12-18 @ 16:18)  HR: 68 (10-12-18 @ 18:21) (67 - 77)  BP: 121/56 (10-12-18 @ 18:21) (118/55 - 143/63)  RR: 20 (10-12-18 @ 18:21) (18 - 20)  SpO2: --  Wt(kg): --Vital Signs Last 24 Hrs  T(C): 36.4 (12 Oct 2018 18:21), Max: 36.4 (12 Oct 2018 16:18)  T(F): 97.6 (12 Oct 2018 18:21), Max: 97.6 (12 Oct 2018 18:21)  HR: 68 (12 Oct 2018 18:21) (67 - 77)  BP: 121/56 (12 Oct 2018 18:21) (118/55 - 143/63)  BP(mean): --  RR: 20 (12 Oct 2018 18:21) (18 - 20)  SpO2: --      10-11-18 @ 07:01  -  10-12-18 @ 07:00  --------------------------------------------------------  IN: 300 mL / OUT: 0 mL / NET: 300 mL    10-12-18 @ 07:01  -  10-12-18 @ 18:27  --------------------------------------------------------  IN: 0 mL / OUT: 50 mL / NET: -50 mL        PHYSICAL EXAM:  GENERAL: NAD  PSYCH: no agitation, baseline mentation  NERVOUS SYSTEM:  Alert & Oriented X3, no new focal deficits  PULMONARY: Clear to percussion bilaterally; No rales, rhonchi, wheezing, or rubs, L anterior chest wall mediport CDI  CARDIOVASCULAR: Regular rate and rhythm; No murmurs, rubs, or gallops  GI: distended but softer, R sided paracentesis site CDI no drainage  EXTREMITIES:  1-2 pitting edema about the same    Consultant(s) Notes Reviewed:  [x ] YES  [ ] NO    Discussed with Consultants/Other Providers [ x] YES     LABS                          8.4    23.11 )-----------( 255      ( 12 Oct 2018 07:41 )             24.5     10-12    137  |  95<L>  |  81<HH>  ----------------------------<  61<L>  4.9   |  14<L>  |  3.7<H>    Ca    8.7      12 Oct 2018 07:41    TPro  6.1  /  Alb  2.7<L>  /  TBili  2.2<H>  /  DBili  x   /  AST  404<H>  /  ALT  140<H>  /  AlkPhos  938<H>  10-11        PT/INR - ( 12 Oct 2018 07:41 )   PT: 16.20 sec;   INR: 1.51 ratio         PTT - ( 12 Oct 2018 07:41 )  PTT:30.4 sec  Lactate Trend        CAPILLARY BLOOD GLUCOSE            RADIOLOGY & ADDITIONAL TESTS:    Imaging Personally Reviewed:  [ ] YES  [ ] NO    HEALTH ISSUES - PROBLEM Dx:

## 2018-10-12 NOTE — PROGRESS NOTE ADULT - ASSESSMENT
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    Creat still rising - today it peaked at 3.7 before the paracentesis.  If creat starts improving tomorrow - possible abd compartment syndrome  Although pt most likely has Hepatorenal Syndrome - started on Albumin and Octreotide yesterday - to be continued  Mild hyptotension - decrease or stop Nifedipine, may need to add Midodrine    HAG Met Acidosis due to RANULFO  - start NA bicarb 650 po q 8 hrs    Will follow

## 2018-10-12 NOTE — PROGRESS NOTE ADULT - SUBJECTIVE AND OBJECTIVE BOX
Patient is a 73y old  Male who presents with a chief complaint of Abdominal Distention (12 Oct 2018 10:36)      Subjective:      Vital Signs Last 24 Hrs  T(C): 36.3 (12 Oct 2018 05:35), Max: 36.3 (12 Oct 2018 05:35)  T(F): 97.3 (12 Oct 2018 05:35), Max: 97.3 (12 Oct 2018 05:35)  HR: 75 (12 Oct 2018 05:35) (75 - 77)  BP: 143/63 (12 Oct 2018 05:35) (118/55 - 143/63)  BP(mean): --  RR: 20 (12 Oct 2018 05:35) (18 - 20)  SpO2: --    PHYSICAL EXAM  General: adult in NAD  HEENT: clear oropharynx, anicteric sclera, pink conjunctiva  Neck: supple  CV: normal S1/S2 with no murmur rubs or gallops  Lungs: positive air movement b/l ant lungs,clear to auscultation, no wheezes, no rales  Abdomen: soft non-tender non-distended, no hepatosplenomegaly  Ext: no clubbing cyanosis or edema  Skin: no rashes and no petechiae  Neuro: alert and oriented X 4, no focal deficits    MEDICATIONS  (STANDING):  aspirin  chewable 81 milliGRAM(s) Oral daily  atorvastatin 10 milliGRAM(s) Oral at bedtime  chlorhexidine 4% Liquid 1 Application(s) Topical <User Schedule>  docusate sodium 100 milliGRAM(s) Oral three times a day  finasteride 5 milliGRAM(s) Oral daily  heparin  Injectable 5000 Unit(s) SubCutaneous every 8 hours  melatonin 3 milliGRAM(s) Oral at bedtime  metoprolol succinate ER 50 milliGRAM(s) Oral daily  NIFEdipine XL 90 milliGRAM(s) Oral daily  octreotide  Injectable 100 MICROGram(s) SubCutaneous two times a day  pantoprazole    Tablet 40 milliGRAM(s) Oral before breakfast  senna 1 Tablet(s) Oral daily  senna 1 Tablet(s) Oral at bedtime  tamsulosin 0.4 milliGRAM(s) Oral at bedtime  traZODone 50 milliGRAM(s) Oral daily    MEDICATIONS  (PRN):  traMADol 25 milliGRAM(s) Oral two times a day PRN Moderate Pain (4 - 6)      LABS:                          8.4    23.11 )-----------( 255      ( 12 Oct 2018 07:41 )             24.5         Mean Cell Volume : 75.6 fL  Mean Cell Hemoglobin : 25.9 pg  Mean Cell Hemoglobin Concentration : 34.3 g/dL  Auto Neutrophil # : 19.78 K/uL  Auto Lymphocyte # : 0.86 K/uL  Auto Monocyte # : 2.17 K/uL  Auto Eosinophil # : 0.07 K/uL  Auto Basophil # : 0.05 K/uL  Auto Neutrophil % : 85.6 %  Auto Lymphocyte % : 3.7 %  Auto Monocyte % : 9.4 %  Auto Eosinophil % : 0.3 %  Auto Basophil % : 0.2 %      Serial CBC's  10-12 @ 07:41  Hct-24.5 / Hgb-8.4 / Plat-255 / RBC-3.24 / WBC-23.11  Serial CBC's  10-11 @ 08:10  Hct-25.4 / Hgb-8.6 / Plat-280 / RBC-3.34 / WBC-21.86  Serial CBC's  10-10 @ 12:02  Hct-31.8 / Hgb-10.6 / Plat-298 / RBC-4.11 / WBC-23.20      10-12    137  |  95<L>  |  81<HH>  ----------------------------<  61<L>  4.9   |  14<L>  |  3.7<H>    Ca    8.7      12 Oct 2018 07:41    TPro  6.1  /  Alb  2.7<L>  /  TBili  2.2<H>  /  DBili  x   /  AST  404<H>  /  ALT  140<H>  /  AlkPhos  938<H>  10-11      PT/INR - ( 12 Oct 2018 07:41 )   PT: 16.20 sec;   INR: 1.51 ratio         PTT - ( 12 Oct 2018 07:41 )  PTT:30.4 sec    Urinalysis Basic - ( 10 Oct 2018 12:02 )    Color: Dark Yellow / Appearance: Cloudy / S.020 / pH: x  Gluc: x / Ketone: Trace  / Bili: Negative / Urobili: 1.0 mg/dL   Blood: x / Protein: Trace mg/dL / Nitrite: Negative   Leuk Esterase: Small / RBC: 1-2 /HPF / WBC 6-10 /HPF   Sq Epi: x / Non Sq Epi: Occasional /HPF / Bacteria: x Patient is a 73y old  Male who presents with a chief complaint of Abdominal Distention (12 Oct 2018 10:36)    Subjective: Patient seen and examined after paracentesis.  Reports that he feels much better and pain has almost completely disappeared.  Now feels tried and wants to rest.    Vital Signs Last 24 Hrs  T(C): 36.3 (12 Oct 2018 05:35), Max: 36.3 (12 Oct 2018 05:35)  T(F): 97.3 (12 Oct 2018 05:35), Max: 97.3 (12 Oct 2018 05:35)  HR: 75 (12 Oct 2018 05:35) (75 - 77)  BP: 143/63 (12 Oct 2018 05:35) (118/55 - 143/63)  BP(mean): --  RR: 20 (12 Oct 2018 05:35) (18 - 20)  SpO2: --    PHYSICAL EXAM  General: adult in NAD, resting comfortably in bed  HEENT: NC/AT, anicteric sclera  Neck: supple  Lungs: not in respiratory distress  Abdomen: distended (significantly less than prior day) but soft, non-tender to palpation  Skin: no rashes and no petechiae  Neuro: alert and oriented X 4, no focal deficits    MEDICATIONS  (STANDING):  aspirin  chewable 81 milliGRAM(s) Oral daily  atorvastatin 10 milliGRAM(s) Oral at bedtime  chlorhexidine 4% Liquid 1 Application(s) Topical <User Schedule>  docusate sodium 100 milliGRAM(s) Oral three times a day  finasteride 5 milliGRAM(s) Oral daily  heparin  Injectable 5000 Unit(s) SubCutaneous every 8 hours  melatonin 3 milliGRAM(s) Oral at bedtime  metoprolol succinate ER 50 milliGRAM(s) Oral daily  NIFEdipine XL 90 milliGRAM(s) Oral daily  octreotide  Injectable 100 MICROGram(s) SubCutaneous two times a day  pantoprazole    Tablet 40 milliGRAM(s) Oral before breakfast  senna 1 Tablet(s) Oral daily  senna 1 Tablet(s) Oral at bedtime  tamsulosin 0.4 milliGRAM(s) Oral at bedtime  traZODone 50 milliGRAM(s) Oral daily    MEDICATIONS  (PRN):  traMADol 25 milliGRAM(s) Oral two times a day PRN Moderate Pain (4 - 6)      LABS:                          8.4    23.11 )-----------( 255      ( 12 Oct 2018 07:41 )             24.5         Mean Cell Volume : 75.6 fL  Mean Cell Hemoglobin : 25.9 pg  Mean Cell Hemoglobin Concentration : 34.3 g/dL  Auto Neutrophil # : 19.78 K/uL  Auto Lymphocyte # : 0.86 K/uL  Auto Monocyte # : 2.17 K/uL  Auto Eosinophil # : 0.07 K/uL  Auto Basophil # : 0.05 K/uL  Auto Neutrophil % : 85.6 %  Auto Lymphocyte % : 3.7 %  Auto Monocyte % : 9.4 %  Auto Eosinophil % : 0.3 %  Auto Basophil % : 0.2 %      Serial CBC's  10-12 @ 07:41  Hct-24.5 / Hgb-8.4 / Plat-255 / RBC-3.24 / WBC-23.11  Serial CBC's  10-11 @ 08:10  Hct-25.4 / Hgb-8.6 / Plat-280 / RBC-3.34 / WBC-21.86  Serial CBC's  10-10 @ 12:02  Hct-31.8 / Hgb-10.6 / Plat-298 / RBC-4.11 / WBC-23.20      10-12    137  |  95<L>  |  81<HH>  ----------------------------<  61<L>  4.9   |  14<L>  |  3.7<H>    Ca    8.7      12 Oct 2018 07:41    TPro  6.1  /  Alb  2.7<L>  /  TBili  2.2<H>  /  DBili  x   /  AST  404<H>  /  ALT  140<H>  /  AlkPhos  938<H>  10-11      PT/INR - ( 12 Oct 2018 07:41 )   PT: 16.20 sec;   INR: 1.51 ratio         PTT - ( 12 Oct 2018 07:41 )  PTT:30.4 sec    Urinalysis Basic - ( 10 Oct 2018 12:02 )    Color: Dark Yellow / Appearance: Cloudy / S.020 / pH: x  Gluc: x / Ketone: Trace  / Bili: Negative / Urobili: 1.0 mg/dL   Blood: x / Protein: Trace mg/dL / Nitrite: Negative   Leuk Esterase: Small / RBC: 1-2 /HPF / WBC 6-10 /HPF   Sq Epi: x / Non Sq Epi: Occasional /HPF / Bacteria: x

## 2018-10-12 NOTE — PROGRESS NOTE ADULT - SUBJECTIVE AND OBJECTIVE BOX
Interventional Radiology Brief- Operative Note    Procedure: right sided image guided paracentesis - diagnostic/therapeutic     Pre-Op Diagnosis: metastatic neuroendocrine tumor of GB     Post-Op Diagnosis: metastatic neuroendocrine tumor of GB     Provider: Samanta Hamilton PA-C    Anesthesia (type):  [ ] General Anesthesia  [ ] Sedation  [ ] Spinal Anesthesia  [ x ] Local/Regional    Contrast: none    Estimated Blood Loss: <5mL    Condition:   [ ] Critical  [ ] Serious  [ ] Fair   [ x ] Good    Findings/Follow up Plan of Care: 4L of madyson color fluid successfully drained     Specimens Removed: 120cc of fluid removed and sent for culture and cytology - cytology forms sent to lab, additional orders entered by primary team in [place]     Implants: none    Complications: none    Condition/Disposition: monitor vitals Q15 x 2 - D/C to floor     Follow up instructions - resume diet and activity as tolerated, resume medications as needed, keep dressing clean and dry - remove after 2 days      Please call Interventional Radiology [phone] with any questions, concerns, or issues.

## 2018-10-12 NOTE — PROGRESS NOTE ADULT - ASSESSMENT
72 yo male with PMH of HTN, BPH, PTSD, DLD, CKD 3, metastatic neuroendocrine tumor of GB (on chemo w/ topotecan) here with worsening abdominal pain secondary to new onset ascites, acute renal failure with metabolic acidosis on CKD3 possibly secondary to hepatorenal syndrome, leukocytosis (stable- likely 2/2 to underlying malignancy)    trend lytes carefully  appreciated renal recs- will treat as hepatorenal syndrome- octreotide/albumin  urine studies/fena 2.7  repeat ABG for worsening AGMA, start bicarb, renal f/u for help with acid/base status management, check lactate  f/u urine output  s/p IR diagnostic/therapeutic thoracentesis about 4 liter removed  send for appropriate studies for saag and cytology- pending result  maintain active T+S   replete with more albumin today  minimize hepatotoxic and nephrotoxic meds- dosing adjustments for gfr  appreciated hem/onc f/u  high risk 2/2 comorbidities  GI eval for worsening hyperbilirubinemia  renal/ IR/ hem/onc f/u's

## 2018-10-12 NOTE — PROGRESS NOTE ADULT - ASSESSMENT
72 yo male with PMH of HTN, BPH, PTSD, DLD, CKD 3, metastatic neuroendocrine tumor of GB (on chemo w/ topotecan) presents for abdominal bloating and discomfort x 5 days.    1. Abdominal bloating and discomfort-abdominal CT showed moderate to large volume abdominopelvic ascites.  Patient planned for diagnostic and therapeutic paracentesis by IR today.  Will follow up results of fluid studies.  If patient's pain does not improve following paracentesis, consider pain management evaluation.  2. RANULFO on CKD 3-concern for hepatorenal syndrome, especially in light of problem 1.  Nephrology is following the patient.  Follow up nephrology recommendations and results of ascitic fluid studies. 74 yo male with PMH of HTN, BPH, PTSD, DLD, CKD 3, metastatic neuroendocrine tumor of GB (on chemo w/ topotecan) presents for abdominal bloating and discomfort x 5 days.    1. Abdominal bloating and discomfort-abdominal CT showed moderate to large volume abdominopelvic ascites.  Patient s/p diagnostic and therapeutic paracentesis by IR today.  Will follow up results of fluid studies.  Patient's pain resolved following paracentesis.  2. RANULFO on CKD 3-concern for hepatorenal syndrome, especially in light of problem 1.  Nephrology is following the patient.  Follow up nephrology recommendations and results of ascitic fluid studies.

## 2018-10-12 NOTE — PROGRESS NOTE ADULT - ASSESSMENT
ARACELIS RODRIGUEZ 73y Male  MRN#: [id number]   CODE STATUS: FULL      SUBJECTIVE  Patient is a 73y old Male who presents with a chief complaint of Abdominal Distention (11 Oct 2018 20:30)    Currently admitted to medicine with the primary diagnosis of Malignant ascites.     Hospital course has been complicated by   Today is hospital day 2d, and this morning he is laying in bed comfortably and reports no overnight events.     Present Today:           Godinez Catheter (x)No/ ()Yes?   Indication:             Central Line (x)No/ ()Yes?   Indication:          IV Fluids (x)No/ ()Yes? 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  BPH (benign prostatic hyperplasia)  PTSD (post-traumatic stress disorder)  Hypertension  Dyslipidemia  CKD (chronic kidney disease), stage III  No significant past surgical history    ALLERGIES:  No Known Allergies    MEDICATIONS:  STANDING MEDICATIONS  aspirin  chewable 81 milliGRAM(s) Oral daily  atorvastatin 10 milliGRAM(s) Oral at bedtime  chlorhexidine 4% Liquid 1 Application(s) Topical <User Schedule>  docusate sodium 100 milliGRAM(s) Oral daily  docusate sodium 100 milliGRAM(s) Oral three times a day  finasteride 5 milliGRAM(s) Oral daily  heparin  Injectable 5000 Unit(s) SubCutaneous every 8 hours  melatonin 3 milliGRAM(s) Oral at bedtime  metoprolol succinate ER 50 milliGRAM(s) Oral daily  NIFEdipine XL 90 milliGRAM(s) Oral daily  octreotide  Injectable 100 MICROGram(s) SubCutaneous two times a day  pantoprazole    Tablet 40 milliGRAM(s) Oral before breakfast  senna 1 Tablet(s) Oral daily  senna 1 Tablet(s) Oral at bedtime  tamsulosin 0.4 milliGRAM(s) Oral at bedtime  traZODone 50 milliGRAM(s) Oral daily    PRN MEDICATIONS  traMADol 25 milliGRAM(s) Oral two times a day PRN      VITAL SIGNS: Last 24 Hours  T(C): 36.3 (12 Oct 2018 05:35), Max: 36.3 (12 Oct 2018 05:35)  T(F): 97.3 (12 Oct 2018 05:35), Max: 97.3 (12 Oct 2018 05:35)  HR: 75 (12 Oct 2018 05:35) (75 - 77)  BP: 143/63 (12 Oct 2018 05:35) (118/55 - 143/63)  BP(mean): --  RR: 20 (12 Oct 2018 05:35) (18 - 20)  SpO2: --    LABS:                        8.4    23.11 )-----------( 255      ( 12 Oct 2018 07:41 )             24.5     10-12    137  |  95<L>  |  81<HH>  ----------------------------<  61<L>  4.9   |  14<L>  |  3.7<H>    Ca    8.7      12 Oct 2018 07:41    TPro  6.1  /  Alb  2.7<L>  /  TBili  2.2<H>  /  DBili  x   /  AST  404<H>  /  ALT  140<H>  /  AlkPhos  938<H>  1011    PT/INR - ( 12 Oct 2018 07:41 )   PT: 16.20 sec;   INR: 1.51 ratio         PTT - ( 12 Oct 2018 07:41 )  PTT:30.4 sec  Urinalysis Basic - ( 10 Oct 2018 12:02 )    Color: Dark Yellow / Appearance: Cloudy / S.020 / pH: x  Gluc: x / Ketone: Trace  / Bili: Negative / Urobili: 1.0 mg/dL   Blood: x / Protein: Trace mg/dL / Nitrite: Negative   Leuk Esterase: Small / RBC: 1-2 /HPF / WBC 6-10 /HPF   Sq Epi: x / Non Sq Epi: Occasional /HPF / Bacteria: x      RADIOLOGY:    IMPRESSION:     1. Since 2018, increased moderate to large volume   abdominopelvic ascites.    2. Numerous overall unchanged ill-defined bilobar hepatic metastases.    3. Overall unchanged extensive retroperitoneal adenopathy.    4. Stable nodular thickening of the left adrenal gland.      PHYSICAL EXAM:    GENERAL: NAD, well-developed, AAOx3  HEENT:  Atraumatic, Normocephalic. 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 Pt was just discharged from the hospital and reports sx started after discharge.  He initially had constipation after starting oxycontin and reports that despite now having bowel movements every other day, he is having worsening abd distension (It looks like I'm 9 months pregnant).  He denies pain but reports severe discomfort. 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 Pt was just discharged from the hospital and reports sx started after discharge.  He initially had constipation after starting oxycontin and reports that despite now having bowel movements every other day, he is having worsening abd distension (It looks like I'm 9 months pregnant).  He denies pain but reports severe discomfort.     #) Ascites  -Possibly Malignant, hx of heavy alcohol use  -CT A/P- moderate to large volume ascites noted  -IR-diagnostic and therapeutic paracentesis scheduled for tomorrow 10/12   -f/u cytology, labs     # ) RANULFO on CKDIII  -Cr 3.4 today   -did not respond to IVF, likely Hepatorenal Syndrome  -Octrotide BID started  -Albumin IV initiated, patient weights 85 kg, dose for HRS 1 gm/kg, pharmacy consulted and as each bag contains 12.5 grams he will need 6 bags at 50cc of 25% albumin.     DVT Prophylaxis- Heparin sub q   GI Prophylaxis -Protonix     # ) Disposition   To be discharged once medically cleared. ARACELIS RODRIGUEZ 73y Male  MRN#: [id number]   CODE STATUS: FULL      SUBJECTIVE  Patient is a 73y old Male who presents with a chief complaint of Abdominal Distention (11 Oct 2018 20:30)    Currently admitted to medicine with the primary diagnosis of Malignant ascites.     Today is hospital day 2d, and this morning he is laying in bed comfortably and reports no overnight events.  He complains of abdominal pain that has decreased since yesterday.     Present Today:           Godinez Catheter (x)No/ ()Yes?   Indication:             Central Line (x)No/ ()Yes?   Indication:          IV Fluids (x)No/ ()Yes? 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  BPH (benign prostatic hyperplasia)  PTSD (post-traumatic stress disorder)  Hypertension  Dyslipidemia  CKD (chronic kidney disease), stage III  No significant past surgical history    ALLERGIES:  No Known Allergies    MEDICATIONS:  STANDING MEDICATIONS  aspirin  chewable 81 milliGRAM(s) Oral daily  atorvastatin 10 milliGRAM(s) Oral at bedtime  chlorhexidine 4% Liquid 1 Application(s) Topical <User Schedule>  docusate sodium 100 milliGRAM(s) Oral daily  docusate sodium 100 milliGRAM(s) Oral three times a day  finasteride 5 milliGRAM(s) Oral daily  heparin  Injectable 5000 Unit(s) SubCutaneous every 8 hours  melatonin 3 milliGRAM(s) Oral at bedtime  metoprolol succinate ER 50 milliGRAM(s) Oral daily  NIFEdipine XL 90 milliGRAM(s) Oral daily  octreotide  Injectable 100 MICROGram(s) SubCutaneous two times a day  pantoprazole    Tablet 40 milliGRAM(s) Oral before breakfast  senna 1 Tablet(s) Oral daily  senna 1 Tablet(s) Oral at bedtime  tamsulosin 0.4 milliGRAM(s) Oral at bedtime  traZODone 50 milliGRAM(s) Oral daily    PRN MEDICATIONS  traMADol 25 milliGRAM(s) Oral two times a day PRN      VITAL SIGNS: Last 24 Hours  T(C): 36.3 (12 Oct 2018 05:35), Max: 36.3 (12 Oct 2018 05:35)  T(F): 97.3 (12 Oct 2018 05:35), Max: 97.3 (12 Oct 2018 05:35)  HR: 75 (12 Oct 2018 05:35) (75 - 77)  BP: 143/63 (12 Oct 2018 05:35) (118/55 - 143/63)  BP(mean): --  RR: 20 (12 Oct 2018 05:35) (18 - 20)  SpO2: --    LABS:                        8.4    23.11 )-----------( 255      ( 12 Oct 2018 07:41 )             24.5     10-12    137  |  95<L>  |  81<HH>  ----------------------------<  61<L>  4.9   |  14<L>  |  3.7<H>    Ca    8.7      12 Oct 2018 07:41    TPro  6.1  /  Alb  2.7<L>  /  TBili  2.2<H>  /  DBili  x   /  AST  404<H>  /  ALT  140<H>  /  AlkPhos  938<H>  1011    PT/INR - ( 12 Oct 2018 07:41 )   PT: 16.20 sec;   INR: 1.51 ratio       PTT - ( 12 Oct 2018 07:41 )  PTT:30.4 sec    Urinalysis Basic - ( 10 Oct 2018 12:02 )    Color: Dark Yellow / Appearance: Cloudy / S.020 / pH: x  Gluc: x / Ketone: Trace  / Bili: Negative / Urobili: 1.0 mg/dL   Blood: x / Protein: Trace mg/dL / Nitrite: Negative   Leuk Esterase: Small / RBC: 1-2 /HPF / WBC 6-10 /HPF   Sq Epi: x / Non Sq Epi: Occasional /HPF / Bacteria: x      RADIOLOGY:    IMPRESSION:     1. Since 2018, increased moderate to large volume   abdominopelvic ascites.    2. Numerous overall unchanged ill-defined bilobar hepatic metastases.    3. Overall unchanged extensive retroperitoneal adenopathy.    4. Stable nodular thickening of the left adrenal gland.      PHYSICAL EXAM:    GENERAL: NAD, well-developed, AAOx3  HEENT:  Atraumatic, Normocephalic. 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 Pt was just discharged from the hospital and reports sx started after discharge.  He initially had constipation after starting oxycontin and reports that despite now having bowel movements every other day, he is having worsening abd distension (It looks like I'm 9 months pregnant).  He denies pain but reports severe discomfort. 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 Pt was just discharged from the hospital and reports sx started after discharge.  He initially had constipation after starting oxycontin and reports that despite now having bowel movements every other day, he is having worsening abd distension (It looks like I'm 9 months pregnant).  He denies pain but reports severe discomfort.     #) Ascites  -Possibly Malignant, hx of heavy alcohol use  -CT A/P- moderate to large volume ascites noted  -IR-diagnostic and therapeutic paracentesis 10/12   -f/u cytology, labs   -traMADol 25 milliGRAM(s) Oral two times a day PRN for pain     # ) RANULFO on CKDIII  -Cr 3.4 today   -did not respond to IVF, likely Hepatorenal Syndrome  -octreotide  Injectable 100 MICROGram(s) SubCutaneous two times a day  -Albumin IV initiated, patient weights 85 kg, dose for HRS 1 gm/kg, pharmacy consulted and as each bag contains 12.5 grams he will need 6 bags at 50cc of 25% albumin.      #) Hx of hypertension  -metoprolol succinate ER 50 milliGRAM(s) Oral daily  -NIFEdipine XL 90 milliGRAM(s) Oral daily    # ) Constipation  -docusate sodium 100 milliGRAM(s) Oral daily  -senna 1 Tablet(s) Oral daily    # ) DLD  -atorvastatin 10 milliGRAM(s) Oral at bedtime    #) Hx of BPH  -finasteride 5 milliGRAM(s) Oral daily  -tamsulosin 0.4 milliGRAM(s) Oral at bedtime    #) Hx of Insomnia  -melatonin 3 milliGRAM(s) Oral at bedtime  -traZODone 50 milliGRAM(s) Oral daily    DVT Prophylaxis- Heparin sub q   GI Prophylaxis -Protonix     # ) Disposition   To be discharged once medically cleared.         PRN MEDICATIONS ARACELIS RODRIGUEZ 73y Male  MRN#: [id number]   CODE STATUS: FULL      SUBJECTIVE  Patient is a 73y old Male who presents with a chief complaint of Abdominal Distention (11 Oct 2018 20:30)    Currently admitted to medicine with the primary diagnosis of Malignant ascites.     Today is hospital day 2d, and this morning he is laying in bed comfortably and reports no overnight events.  He complains of abdominal pain that has decreased since yesterday.     Present Today:           Godinez Catheter (x)No/ ()Yes?   Indication:             Central Line (x)No/ ()Yes?   Indication:          IV Fluids (x)No/ ()Yes? 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  BPH (benign prostatic hyperplasia)  PTSD (post-traumatic stress disorder)  Hypertension  Dyslipidemia  CKD (chronic kidney disease), stage III  No significant past surgical history    ALLERGIES:  No Known Allergies    MEDICATIONS:  STANDING MEDICATIONS  aspirin  chewable 81 milliGRAM(s) Oral daily  atorvastatin 10 milliGRAM(s) Oral at bedtime  chlorhexidine 4% Liquid 1 Application(s) Topical <User Schedule>  docusate sodium 100 milliGRAM(s) Oral daily  docusate sodium 100 milliGRAM(s) Oral three times a day  finasteride 5 milliGRAM(s) Oral daily  heparin  Injectable 5000 Unit(s) SubCutaneous every 8 hours  melatonin 3 milliGRAM(s) Oral at bedtime  metoprolol succinate ER 50 milliGRAM(s) Oral daily  NIFEdipine XL 90 milliGRAM(s) Oral daily  octreotide  Injectable 100 MICROGram(s) SubCutaneous two times a day  pantoprazole    Tablet 40 milliGRAM(s) Oral before breakfast  senna 1 Tablet(s) Oral daily  senna 1 Tablet(s) Oral at bedtime  tamsulosin 0.4 milliGRAM(s) Oral at bedtime  traZODone 50 milliGRAM(s) Oral daily    PRN MEDICATIONS  traMADol 25 milliGRAM(s) Oral two times a day PRN      VITAL SIGNS: Last 24 Hours  T(C): 36.3 (12 Oct 2018 05:35), Max: 36.3 (12 Oct 2018 05:35)  T(F): 97.3 (12 Oct 2018 05:35), Max: 97.3 (12 Oct 2018 05:35)  HR: 75 (12 Oct 2018 05:35) (75 - 77)  BP: 143/63 (12 Oct 2018 05:35) (118/55 - 143/63)  BP(mean): --  RR: 20 (12 Oct 2018 05:35) (18 - 20)  SpO2: --    LABS:                        8.4    23.11 )-----------( 255      ( 12 Oct 2018 07:41 )             24.5     10-12    137  |  95<L>  |  81<HH>  ----------------------------<  61<L>  4.9   |  14<L>  |  3.7<H>    Ca    8.7      12 Oct 2018 07:41    TPro  6.1  /  Alb  2.7<L>  /  TBili  2.2<H>  /  DBili  x   /  AST  404<H>  /  ALT  140<H>  /  AlkPhos  938<H>  1011    PT/INR - ( 12 Oct 2018 07:41 )   PT: 16.20 sec;   INR: 1.51 ratio       PTT - ( 12 Oct 2018 07:41 )  PTT:30.4 sec    Urinalysis Basic - ( 10 Oct 2018 12:02 )    Color: Dark Yellow / Appearance: Cloudy / S.020 / pH: x  Gluc: x / Ketone: Trace  / Bili: Negative / Urobili: 1.0 mg/dL   Blood: x / Protein: Trace mg/dL / Nitrite: Negative   Leuk Esterase: Small / RBC: 1-2 /HPF / WBC 6-10 /HPF   Sq Epi: x / Non Sq Epi: Occasional /HPF / Bacteria: x    RADIOLOGY:    IMPRESSION:     1. Since 2018, increased moderate to large volume   abdominopelvic ascites.    2. Numerous overall unchanged ill-defined bilobar hepatic metastases.    3. Overall unchanged extensive retroperitoneal adenopathy.    4. Stable nodular thickening of the left adrenal gland.      PHYSICAL EXAM:    GENERAL: NAD, well-developed, AAOx3  HEENT:  Atraumatic, Normocephalic. 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 Pt was just discharged from the hospital and reports sx started after discharge.  He initially had constipation after starting oxycontin and reports that despite now having bowel movements every other day, he is having worsening abd distension (It looks like I'm 9 months pregnant).  He denies pain but reports severe discomfort. 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 Pt was just discharged from the hospital and reports sx started after discharge.  He initially had constipation after starting Oxycontin and reports that despite now having bowel movements every other day, he is having worsening abd distension (It looks like I'm 9 months pregnant).  He denies pain but reports severe discomfort.     #) Ascites s/p diagnostic/therapeutic paracentesis 10/12  -CT A/P- moderate to large volume ascites noted 10/10  -4L of madyson color fluid successfully drained, 120cc of fluid removed and sent for culture and cytology   -f/u cytology, labs   -Possibly Malignant, hx of heavy alcohol use  -traMADol 25 milliGRAM(s) Oral two times a day PRN for pain      # ) RANULFO on CKDIII-worsening   -Cr 3.7 today   -did not respond to IVF, likely Hepatorenal Syndrome  -octreotide  Injectable 100 MICROGram(s) Subcutaneous two times a day  -Albumin IV initiated, patient weights 85 kg, dose for HRS 1 gm/kg, pharmacy consulted and as each bag contains 12.5 grams he will need 6 bags at 50cc of 25% albumin administered 10/11, will administer again today per Nephro (2 days)       #) Hx of Hypertension  -Metoprolol succinate ER 50 milliGRAM(s) Oral daily  -NIFEdipine XL 90 milliGRAM(s) Oral daily    # ) Constipation  -docusate sodium 100 milliGRAM(s) Oral daily  -senna 1 Tablet(s) Oral daily    # ) DLD  -Atorvastatin 10 milliGRAM(s) Oral at bedtime    #) Hx of BPH  -Finasteride 5 milliGRAM(s) Oral daily  -Tamsulosin 0.4 milliGRAM(s) Oral at bedtime    #) Hx of Insomnia  -Melatonin 3 milliGRAM(s) Oral at bedtime  -TraZODone 50 milliGRAM(s) Oral daily    DVT Prophylaxis- Heparin sub q   GI Prophylaxis -Protonix     # ) Disposition     To be discharged once medically cleared.

## 2018-10-12 NOTE — PROGRESS NOTE ADULT - SUBJECTIVE AND OBJECTIVE BOX
Nephrology progress note    Patient is seen and examined, events over the last 24 h noted .  Pt c/o abd distension Seen at 10 am in IR - going for paracentesis    Allergies:  No Known Allergies    Hospital Medications:   MEDICATIONS  (STANDING):  albumin human 25% IVPB 50 milliLiter(s) IV Intermittent every 1 hour  aspirin  chewable 81 milliGRAM(s) Oral daily  atorvastatin 10 milliGRAM(s) Oral at bedtime  chlorhexidine 4% Liquid 1 Application(s) Topical <User Schedule>  docusate sodium 100 milliGRAM(s) Oral three times a day  finasteride 5 milliGRAM(s) Oral daily  heparin  Injectable 5000 Unit(s) SubCutaneous every 8 hours  melatonin 3 milliGRAM(s) Oral at bedtime  metoprolol succinate ER 50 milliGRAM(s) Oral daily  NIFEdipine XL 90 milliGRAM(s) Oral daily  octreotide  Injectable 100 MICROGram(s) SubCutaneous two times a day  pantoprazole    Tablet 40 milliGRAM(s) Oral before breakfast  senna 1 Tablet(s) Oral daily  senna 1 Tablet(s) Oral at bedtime  sodium bicarbonate 650 milliGRAM(s) Oral three times a day  tamsulosin 0.4 milliGRAM(s) Oral at bedtime  traZODone 50 milliGRAM(s) Oral daily        VITALS:  T(F): 97.1 (10-12-18 @ 21:01), Max: 97.6 (10-12-18 @ 18:21)  HR: 69 (10-12-18 @ 21:01)  BP: 109/55 (10-12-18 @ 21:01)  RR: 16 (10-12-18 @ 21:01)  SpO2: --  Wt(kg): --    10-11 @ 07:01  -  10-12 @ 07:00  --------------------------------------------------------  IN: 300 mL / OUT: 0 mL / NET: 300 mL    10-12 @ 07:01  -  10-12 @ 21:45  --------------------------------------------------------  IN: 0 mL / OUT: 50 mL / NET: -50 mL          PHYSICAL EXAM:  Constitutional: NAD  HEENT: anicteric sclera, oropharynx clear, MMM  Neck: No JVD  Respiratory: CTAB, no wheezes, rales or rhonchi  Cardiovascular: S1, S2, RRR  Gastrointestinal: BS+, soft, distended, ascites present  Extremities:  No peripheral edema  Neurological: A/O x 3  : No CVA tenderness. No curtis.   Skin: No rashes  Vascular Access:    LABS:  10-12    137  |  95<L>  |  81<HH>  ----------------------------<  61<L>  4.9   |  14<L>  |  3.7<H>    Ca    8.7      12 Oct 2018 07:41    TPro  6.1  /  Alb  2.7<L>  /  TBili  2.2<H>  /  DBili      /  AST  404<H>  /  ALT  140<H>  /  AlkPhos  938<H>  10-11                          8.4    23.11 )-----------( 255      ( 12 Oct 2018 07:41 )             24.5       Urine Studies:  Urinalysis Basic - ( 10 Oct 2018 12:02 )    Color: Dark Yellow / Appearance: Cloudy / S.020 / pH:   Gluc:  / Ketone: Trace  / Bili: Negative / Urobili: 1.0 mg/dL   Blood:  / Protein: Trace mg/dL / Nitrite: Negative   Leuk Esterase: Small / RBC: 1-2 /HPF / WBC 6-10 /HPF   Sq Epi:  / Non Sq Epi: Occasional /HPF / Bacteria:       Sodium, Random Urine: 20.0 mmoL/L (10-11 @ 10:24)  Creatinine, Random Urine: 20 mg/dL (10-11 @ 10:24)    RADIOLOGY & ADDITIONAL STUDIES:  < from: CT Abdomen and Pelvis No Cont (10.10.18 @ 17:46) >  KIDNEYS: Stable bilateral renal cysts.    ABDOMINOPELVIC NODES: Overall unchanged extensive retroperitoneal   adenopathy, measuring up to 6.8 x 3.5 cm at the level of the renal   arteries.    < end of copied text >  < from: CT Abdomen and Pelvis No Cont (10.10.18 @ 17:46) >  . Since 2018, increased moderate to large volume   abdominopelvic ascites.    2. Numerous overall unchanged ill-defined bilobar hepatic metastases.    3. Overall unchanged extensive retroperitoneal adenopathy.      < end of copied text >

## 2018-10-13 LAB
ALBUMIN SERPL ELPH-MCNC: 3.6 G/DL — SIGNIFICANT CHANGE UP (ref 3.5–5.2)
ALP SERPL-CCNC: 668 U/L — HIGH (ref 30–115)
ALT FLD-CCNC: 131 U/L — HIGH (ref 0–41)
ANION GAP SERPL CALC-SCNC: 22 MMOL/L — HIGH (ref 7–14)
AST SERPL-CCNC: 359 U/L — HIGH (ref 0–41)
BASOPHILS # BLD AUTO: 0.04 K/UL — SIGNIFICANT CHANGE UP (ref 0–0.2)
BASOPHILS NFR BLD AUTO: 0.2 % — SIGNIFICANT CHANGE UP (ref 0–1)
BILIRUB SERPL-MCNC: 3.8 MG/DL — HIGH (ref 0.2–1.2)
BUN SERPL-MCNC: 89 MG/DL — CRITICAL HIGH (ref 10–20)
CALCIUM SERPL-MCNC: 8.3 MG/DL — LOW (ref 8.5–10.1)
CHLORIDE SERPL-SCNC: 98 MMOL/L — SIGNIFICANT CHANGE UP (ref 98–110)
CO2 SERPL-SCNC: 17 MMOL/L — SIGNIFICANT CHANGE UP (ref 17–32)
CREAT SERPL-MCNC: 3.8 MG/DL — HIGH (ref 0.7–1.5)
EOSINOPHIL # BLD AUTO: 0.05 K/UL — SIGNIFICANT CHANGE UP (ref 0–0.7)
EOSINOPHIL NFR BLD AUTO: 0.2 % — SIGNIFICANT CHANGE UP (ref 0–8)
GLUCOSE SERPL-MCNC: 143 MG/DL — HIGH (ref 70–99)
HCT VFR BLD CALC: 24 % — LOW (ref 42–52)
HGB BLD-MCNC: 8.2 G/DL — LOW (ref 14–18)
IMM GRANULOCYTES NFR BLD AUTO: 0.9 % — HIGH (ref 0.1–0.3)
LYMPHOCYTES # BLD AUTO: 0.71 K/UL — LOW (ref 1.2–3.4)
LYMPHOCYTES # BLD AUTO: 3.1 % — LOW (ref 20.5–51.1)
MCHC RBC-ENTMCNC: 25.9 PG — LOW (ref 27–31)
MCHC RBC-ENTMCNC: 34.2 G/DL — SIGNIFICANT CHANGE UP (ref 32–37)
MCV RBC AUTO: 75.9 FL — LOW (ref 80–94)
MONOCYTES # BLD AUTO: 1.78 K/UL — HIGH (ref 0.1–0.6)
MONOCYTES NFR BLD AUTO: 7.8 % — SIGNIFICANT CHANGE UP (ref 1.7–9.3)
NEUTROPHILS # BLD AUTO: 20.04 K/UL — HIGH (ref 1.4–6.5)
NEUTROPHILS NFR BLD AUTO: 87.8 % — HIGH (ref 42.2–75.2)
NRBC # BLD: 0 /100 WBCS — SIGNIFICANT CHANGE UP (ref 0–0)
PHOSPHATE SERPL-MCNC: 5.3 MG/DL — HIGH (ref 2.1–4.9)
PLATELET # BLD AUTO: 261 K/UL — SIGNIFICANT CHANGE UP (ref 130–400)
POTASSIUM SERPL-MCNC: 5 MMOL/L — SIGNIFICANT CHANGE UP (ref 3.5–5)
POTASSIUM SERPL-SCNC: 5 MMOL/L — SIGNIFICANT CHANGE UP (ref 3.5–5)
PROT SERPL-MCNC: 6.2 G/DL — SIGNIFICANT CHANGE UP (ref 6–8)
RBC # BLD: 3.16 M/UL — LOW (ref 4.7–6.1)
RBC # FLD: 20.3 % — HIGH (ref 11.5–14.5)
SODIUM SERPL-SCNC: 137 MMOL/L — SIGNIFICANT CHANGE UP (ref 135–146)
UUN UR-MCNC: 481 MG/DL — SIGNIFICANT CHANGE UP
WBC # BLD: 22.83 K/UL — HIGH (ref 4.8–10.8)
WBC # FLD AUTO: 22.83 K/UL — HIGH (ref 4.8–10.8)

## 2018-10-13 RX ORDER — CEFTRIAXONE 500 MG/1
1 INJECTION, POWDER, FOR SOLUTION INTRAMUSCULAR; INTRAVENOUS EVERY 24 HOURS
Qty: 0 | Refills: 0 | Status: DISCONTINUED | OUTPATIENT
Start: 2018-10-14 | End: 2018-10-14

## 2018-10-13 RX ORDER — ALBUMIN HUMAN 25 %
50 VIAL (ML) INTRAVENOUS
Qty: 0 | Refills: 0 | Status: COMPLETED | OUTPATIENT
Start: 2018-10-13 | End: 2018-10-14

## 2018-10-13 RX ORDER — CEFTRIAXONE 500 MG/1
INJECTION, POWDER, FOR SOLUTION INTRAMUSCULAR; INTRAVENOUS
Qty: 0 | Refills: 0 | Status: DISCONTINUED | OUTPATIENT
Start: 2018-10-13 | End: 2018-10-14

## 2018-10-13 RX ORDER — CEFTRIAXONE 500 MG/1
1 INJECTION, POWDER, FOR SOLUTION INTRAMUSCULAR; INTRAVENOUS ONCE
Qty: 0 | Refills: 0 | Status: COMPLETED | OUTPATIENT
Start: 2018-10-13 | End: 2018-10-13

## 2018-10-13 RX ORDER — ONDANSETRON 8 MG/1
4 TABLET, FILM COATED ORAL EVERY 6 HOURS
Qty: 0 | Refills: 0 | Status: DISCONTINUED | OUTPATIENT
Start: 2018-10-13 | End: 2018-10-20

## 2018-10-13 RX ADMIN — PANTOPRAZOLE SODIUM 40 MILLIGRAM(S): 20 TABLET, DELAYED RELEASE ORAL at 06:03

## 2018-10-13 RX ADMIN — Medication 50 MILLILITER(S): at 22:15

## 2018-10-13 RX ADMIN — TAMSULOSIN HYDROCHLORIDE 0.4 MILLIGRAM(S): 0.4 CAPSULE ORAL at 21:23

## 2018-10-13 RX ADMIN — OCTREOTIDE ACETATE 100 MICROGRAM(S): 200 INJECTION, SOLUTION INTRAVENOUS; SUBCUTANEOUS at 19:37

## 2018-10-13 RX ADMIN — HEPARIN SODIUM 5000 UNIT(S): 5000 INJECTION INTRAVENOUS; SUBCUTANEOUS at 05:20

## 2018-10-13 RX ADMIN — OCTREOTIDE ACETATE 100 MICROGRAM(S): 200 INJECTION, SOLUTION INTRAVENOUS; SUBCUTANEOUS at 05:21

## 2018-10-13 RX ADMIN — FINASTERIDE 5 MILLIGRAM(S): 5 TABLET, FILM COATED ORAL at 11:30

## 2018-10-13 RX ADMIN — CHLORHEXIDINE GLUCONATE 1 APPLICATION(S): 213 SOLUTION TOPICAL at 05:20

## 2018-10-13 RX ADMIN — Medication 50 MILLILITER(S): at 23:11

## 2018-10-13 RX ADMIN — ONDANSETRON 4 MILLIGRAM(S): 8 TABLET, FILM COATED ORAL at 09:00

## 2018-10-13 RX ADMIN — ATORVASTATIN CALCIUM 10 MILLIGRAM(S): 80 TABLET, FILM COATED ORAL at 21:23

## 2018-10-13 RX ADMIN — Medication 81 MILLIGRAM(S): at 11:30

## 2018-10-13 RX ADMIN — Medication 50 MILLIGRAM(S): at 05:21

## 2018-10-13 RX ADMIN — Medication 50 MILLIGRAM(S): at 11:30

## 2018-10-13 RX ADMIN — HEPARIN SODIUM 5000 UNIT(S): 5000 INJECTION INTRAVENOUS; SUBCUTANEOUS at 21:23

## 2018-10-13 RX ADMIN — Medication 650 MILLIGRAM(S): at 21:23

## 2018-10-13 RX ADMIN — Medication 650 MILLIGRAM(S): at 15:01

## 2018-10-13 RX ADMIN — Medication 650 MILLIGRAM(S): at 05:21

## 2018-10-13 RX ADMIN — Medication 3 MILLIGRAM(S): at 21:23

## 2018-10-13 RX ADMIN — CEFTRIAXONE 100 GRAM(S): 500 INJECTION, POWDER, FOR SOLUTION INTRAMUSCULAR; INTRAVENOUS at 15:00

## 2018-10-13 RX ADMIN — HEPARIN SODIUM 5000 UNIT(S): 5000 INJECTION INTRAVENOUS; SUBCUTANEOUS at 15:01

## 2018-10-13 RX ADMIN — Medication 90 MILLIGRAM(S): at 05:21

## 2018-10-13 RX ADMIN — Medication 50 MILLILITER(S): at 01:01

## 2018-10-13 NOTE — PROGRESS NOTE ADULT - ASSESSMENT
ARACELIS RODRIGUEZ 73y Male  MRN#: [id number]   CODE STATUS: FULL      SUBJECTIVE  Patient is a 73y old Male who presents with a chief complaint of Abdominal Distention (12 Oct 2018 21:44)    Currently admitted to medicine with the primary diagnosis of Malignant ascites     Hospital course has been complicated by   Today is hospital day 3d, and this morning he is laying in bed comfortably and reports no overnight events.     Present Today:           Godinez Catheter ()No/ ()Yes?   Indication:             Central Line ()No/ ()Yes?   Indication:          IV Fluids ()No/ ()Yes? 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  BPH (benign prostatic hyperplasia)  PTSD (post-traumatic stress disorder)  Hypertension  Dyslipidemia  CKD (chronic kidney disease), stage III  No significant past surgical history    ALLERGIES:  No Known Allergies    MEDICATIONS:  STANDING MEDICATIONS  aspirin  chewable 81 milliGRAM(s) Oral daily  atorvastatin 10 milliGRAM(s) Oral at bedtime  chlorhexidine 4% Liquid 1 Application(s) Topical <User Schedule>  docusate sodium 100 milliGRAM(s) Oral three times a day  finasteride 5 milliGRAM(s) Oral daily  heparin  Injectable 5000 Unit(s) SubCutaneous every 8 hours  melatonin 3 milliGRAM(s) Oral at bedtime  metoprolol succinate ER 50 milliGRAM(s) Oral daily  NIFEdipine XL 90 milliGRAM(s) Oral daily  octreotide  Injectable 100 MICROGram(s) SubCutaneous two times a day  pantoprazole    Tablet 40 milliGRAM(s) Oral before breakfast  senna 1 Tablet(s) Oral daily  senna 1 Tablet(s) Oral at bedtime  sodium bicarbonate 650 milliGRAM(s) Oral three times a day  tamsulosin 0.4 milliGRAM(s) Oral at bedtime  traZODone 50 milliGRAM(s) Oral daily    PRN MEDICATIONS  ondansetron Injectable 4 milliGRAM(s) IV Push every 6 hours PRN  traMADol 25 milliGRAM(s) Oral two times a day PRN      VITAL SIGNS: Last 24 Hours  T(C): 36.5 (13 Oct 2018 05:04), Max: 36.5 (13 Oct 2018 05:04)  T(F): 97.7 (13 Oct 2018 05:04), Max: 97.7 (13 Oct 2018 05:04)  HR: 75 (13 Oct 2018 05:04) (67 - 77)  BP: 120/58 (13 Oct 2018 05:04) (103/53 - 139/65)  BP(mean): --  RR: 18 (13 Oct 2018 05:04) (16 - 20)  SpO2: 99% (12 Oct 2018 22:51) (99% - 99%)    LABS:                        8.4    23.11 )-----------( 255      ( 12 Oct 2018 07:41 )             24.5     10-12    137  |  95<L>  |  81<HH>  ----------------------------<  61<L>  4.9   |  14<L>  |  3.7<H>    Ca    8.7      12 Oct 2018 07:41      PT/INR - ( 12 Oct 2018 07:41 )   PT: 16.20 sec;   INR: 1.51 ratio         PTT - ( 12 Oct 2018 07:41 )  PTT:30.4 sec    ABG - ( 12 Oct 2018 18:27 )  pH, Arterial: 7.37  pH, Blood: x     /  pCO2: 29    /  pO2: 76    / HCO3: 17    / Base Excess: -7.5  /  SaO2: 95          RADIOLOGY:        PHYSICAL EXAM:    GENERAL: NAD, well-developed, AAOx3  HEENT:  Atraumatic, Normocephalic. EOMI, PERRLA, conjunctiva and sclera clear, No JVD  PULMONARY: Clear to auscultation bilaterally; No wheeze  CARDIOVASCULAR: Regular rate and rhythm; No murmurs, rubs, or gallops  GASTROINTESTINAL: Soft, Nontender, Nondistended; Bowel sounds present  MUSCULOSKELETAL:  2+ Peripheral Pulses, No clubbing, cyanosis, or edema  NEUROLOGY: non-focal  SKIN: No rashes or lesions    ADMISSION SUMMARY    HPI: This is a 73yoM with PMH of HTN, BPH, PTSD, DLD, CKD 3, metastatic neuroendocrine tumor of GB (on chemo w/ topotecan)  presents for abd bloating and discomfort x 5 days. Pt was just discharged from the hospital and reports sx started after discharge.  He initially had constipation after starting oxycontin and reports that despite now having bowel movements every other day, he is having worsening abd distension (It looks like I'm 9 months pregnant).  He denies pain but reports severe discomfort. (10 Oct 2018 23:08)    ASSESSMENT & PLAN    72 y/o M with PMH of HTN, BPH, PTSD, DLD, CKD 3 (40), metastatic neuroendocrine tumor of GB (on chemo w/ topotecan)  presents for abd bloating and discomfort x 5 days. Pt was just discharged from the hospital and reports sx started after discharge.  He initially had constipation after starting Oxycontin and reports that despite now having bowel movements every other day, he is having worsening abd distension (It looks like I'm 9 months pregnant).  He denies pain but reports severe discomfort.     #) Ascites s/p diagnostic/therapeutic paracentesis 10/12  -Possibly Malignant, hx of heavy alcohol use  -CT A/P- moderate to large volume ascites noted 10/10  -4L of madyson color fluid successfully drained, 120cc of fluid removed and sent for culture and cytology 10/12  -f/u cytology, labs from ascitic fluid   -traMADol 25 milliGRAM(s) Oral two times a day PRN for pain (pain has resolved since paracentesis)     # ) RANULFO on CKDIII-worsening   -Cr 3.7 10/12  -did not respond to IVF, likely Hepatorenal Syndrome  -octreotide  Injectable 100 MICROGram(s) Subcutaneous two times a day  -Albumin IV initiated, patient weights 85 kg, dose for HRS 1 gm/kg, pharmacy consulted and as each bag contains 12.5 grams he will need 6 bags at 50cc of 25% albumin administered 10/11, will administer again today per Nephro (2 days)  -Fena 2.7   -ABG - ( 12 Oct 2018 18:27 ) pH, Arterial: 7.37  pH, Blood: x     /  pCO2: 29    /  pO2: 76    / HCO3: 17    / Base Excess: -7.5  /  SaO2: 95  #) Hx of Hypertension  -Metoprolol succinate ER 50 milliGRAM(s) Oral daily  -NIFEdipine XL 90 milliGRAM(s) Oral daily    #) Hyperbilirubinemia, Transaminitis  -GI consult placed    # ) Constipation  -docusate sodium 100 milliGRAM(s) Oral daily  -senna 1 Tablet(s) Oral daily    #) Mild hypotension  -120/58 today  -Nephro: May stop Nifedipine, may need to add Midodrine    # ) DLD  -Atorvastatin 10 milliGRAM(s) Oral at bedtime    #) Hx of BPH  -Finasteride 5 milliGRAM(s) Oral daily  -Tamsulosin 0.4 milliGRAM(s) Oral at bedtime    #) Hx of Insomnia  -Melatonin 3 milliGRAM(s) Oral at bedtime  -TraZODone 50 milliGRAM(s) Oral daily    DVT Prophylaxis- Heparin sub q     GI Prophylaxis -Protonix ARACELIS RODRIGUEZ 73y Male  MRN#: [id number]   CODE STATUS: FULL      SUBJECTIVE  Patient is a 73y old Male who presents with a chief complaint of Abdominal Distention (12 Oct 2018 21:44)    Currently admitted to medicine with the primary diagnosis of Malignant ascites     Today is hospital day 3d, and this morning he is laying in bed comfortably and reports multiple episodes of diarrhea and nausea.  His abdominal pain has mostly resolved.  He has no other complaints today.     Present Today:           Godinez Catheter (x)No/ ()Yes?   Indication:             Central Line ()No/ (x)Yes?   Indication:  Chemotx          IV Fluids (x)No/ ()Yes? 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  BPH (benign prostatic hyperplasia)  PTSD (post-traumatic stress disorder)  Hypertension  Dyslipidemia  CKD (chronic kidney disease), stage III  No significant past surgical history    ALLERGIES:  No Known Allergies    MEDICATIONS:  STANDING MEDICATIONS  aspirin  chewable 81 milliGRAM(s) Oral daily  atorvastatin 10 milliGRAM(s) Oral at bedtime  chlorhexidine 4% Liquid 1 Application(s) Topical <User Schedule>  docusate sodium 100 milliGRAM(s) Oral three times a day  finasteride 5 milliGRAM(s) Oral daily  heparin  Injectable 5000 Unit(s) SubCutaneous every 8 hours  melatonin 3 milliGRAM(s) Oral at bedtime  metoprolol succinate ER 50 milliGRAM(s) Oral daily  NIFEdipine XL 90 milliGRAM(s) Oral daily  octreotide  Injectable 100 MICROGram(s) SubCutaneous two times a day  pantoprazole    Tablet 40 milliGRAM(s) Oral before breakfast  senna 1 Tablet(s) Oral daily  senna 1 Tablet(s) Oral at bedtime  sodium bicarbonate 650 milliGRAM(s) Oral three times a day  tamsulosin 0.4 milliGRAM(s) Oral at bedtime  traZODone 50 milliGRAM(s) Oral daily    PRN MEDICATIONS  ondansetron Injectable 4 milliGRAM(s) IV Push every 6 hours PRN  traMADol 25 milliGRAM(s) Oral two times a day PRN      VITAL SIGNS: Last 24 Hours  T(C): 36.5 (13 Oct 2018 05:04), Max: 36.5 (13 Oct 2018 05:04)  T(F): 97.7 (13 Oct 2018 05:04), Max: 97.7 (13 Oct 2018 05:04)  HR: 75 (13 Oct 2018 05:04) (67 - 77)  BP: 120/58 (13 Oct 2018 05:04) (103/53 - 139/65)  BP(mean): --  RR: 18 (13 Oct 2018 05:04) (16 - 20)  SpO2: 99% (12 Oct 2018 22:51) (99% - 99%)    LABS:                        8.4    23.11 )-----------( 255      ( 12 Oct 2018 07:41 )             24.5     10-12    137  |  95<L>  |  81<HH>  ----------------------------<  61<L>  4.9   |  14<L>  |  3.7<H>    Ca    8.7      12 Oct 2018 07:41      PT/INR - ( 12 Oct 2018 07:41 )   PT: 16.20 sec;   INR: 1.51 ratio         PTT - ( 12 Oct 2018 07:41 )  PTT:30.4 sec    ABG - ( 12 Oct 2018 18:27 )  pH, Arterial: 7.37  pH, Blood: x     /  pCO2: 29    /  pO2: 76    / HCO3: 17    / Base Excess: -7.5  /  SaO2: 95          RADIOLOGY:    No new imaging     PHYSICAL EXAM:    GENERAL: Nauseous well-developed, AAOx3  HEENT:  Atraumatic, Normocephalic. EOMI, PERRLA, conjunctiva and sclera clear, No JVD  PULMONARY: Clear to auscultation bilaterally; No wheeze  CARDIOVASCULAR: Regular rate and rhythm; No murmurs, rubs, or gallops  GASTROINTESTINAL: Soft, Nontender, +distended; tympanic, hyperactive bowel sounds present  MUSCULOSKELETAL:  2+ Peripheral Pulses, No clubbing, cyanosis, or edema  NEUROLOGY: non-focal  SKIN: No rashes or lesions, port a cath on left chest wall, clean dry, good blood return    ADMISSION SUMMARY    HPI: This is a 73yoM with PMH of HTN, BPH, PTSD, DLD, CKD 3, metastatic neuroendocrine tumor of GB (on chemo w/ topotecan)  presents for abd bloating and discomfort x 5 days. Pt was just discharged from the hospital and reports sx started after discharge.  He initially had constipation after starting oxycontin and reports that despite now having bowel movements every other day, he is having worsening abd distension (It looks like I'm 9 months pregnant).  He denies pain but reports severe discomfort. (10 Oct 2018 23:08)    ASSESSMENT & PLAN    74 y/o M with PMH of HTN, BPH, PTSD, DLD, CKD 3 (40), metastatic neuroendocrine tumor of GB (on chemo w/ topotecan)  presents for abd bloating and discomfort x 5 days. Pt was just discharged from the hospital and reports sx started after discharge.  He initially had constipation after starting Oxycontin and reports that despite now having bowel movements every other day, he is having worsening abd distension (It looks like I'm 9 months pregnant).  He denies pain but reports severe discomfort.     #) Ascites s/p diagnostic/therapeutic paracentesis 10/12  -Possibly Malignant, hx of heavy alcohol use  -CT A/P- moderate to large volume ascites noted 10/10  -4L of madyson color fluid successfully drained, 120cc of fluid removed and sent for culture and cytology 10/12  -f/u cytology, labs from ascitic fluid   -traMADol 25 milliGRAM(s) Oral two times a day PRN for pain (pain has resolved since paracentesis)   -ondansetron Injectable 4 milliGRAM(s) IV Push every 6 hours PRN for nausea, last qtc 438    # ) RANULFO on CKDIII-worsening   -Cr 3.7 10/12, will recheck today   -did not respond to IVF, likely Hepatorenal Syndrome  -octreotide  Injectable 100 MICROGram(s) Subcutaneous two times a day  -Albumin IV initiated, patient weights 85 kg, dose for HRS 1 gm/kg, pharmacy consulted and as each bag contains 12.5 grams he will need 6 bags at 50cc of 25% albumin administered 10/11, will administer again today per Nephro (2 days)  -Fena 2.7   -ABG - ( 12 Oct 2018 18:27 ) pH, Arterial: 7.37  pH, Blood: x     /  pCO2: 29    /  pO2: 76    / HCO3: 17    / Base Excess: -7.5  /  SaO2: 95    #) Hx of Hypertension  -Metoprolol succinate ER 50 milliGRAM(s) Oral daily  -NIFEdipine XL 90 milliGRAM(s) Oral daily    #) Hyperbilirubinemia, Transaminitis  -f/u cmp   -GI consult placed    # ) Constipation  -docusate sodium 100 milliGRAM(s) Oral daily  -senna 1 Tablet(s) Oral daily    #) Mild hypotension  -120/58 today  -Nephro: May stop Nifedipine, may need to add Midodrine    # ) DLD  -Atorvastatin 10 milliGRAM(s) Oral at bedtime    #) Hx of BPH  -Finasteride 5 milliGRAM(s) Oral daily  -Tamsulosin 0.4 milliGRAM(s) Oral at bedtime    #) Hx of Insomnia  -Melatonin 3 milliGRAM(s) Oral at bedtime  -TraZODone 50 milliGRAM(s) Oral daily    DVT Prophylaxis- Heparin sub q     GI Prophylaxis -Protonix

## 2018-10-13 NOTE — PROGRESS NOTE ADULT - SUBJECTIVE AND OBJECTIVE BOX
ARACELIS RODRIGUEZ  73y  Male      Patient is a 73y old  Male who presents with a chief complaint of Abdominal Distention (13 Oct 2018 13:50)      INTERVAL HPI/OVERNIGHT EVENTS: having recurrent abdominal distention and overall nausea and pain. not urinating much      REVIEW OF SYSTEMS:  as above  All other review of systems negative    T(C): 36.2 (10-13-18 @ 13:15), Max: 36.5 (10-13-18 @ 05:04)  HR: 79 (10-13-18 @ 13:15) (68 - 79)  BP: 142/95 (10-13-18 @ 13:15) (103/53 - 142/95)  RR: 18 (10-13-18 @ 13:15) (16 - 18)  SpO2: 99% (10-12-18 @ 22:51) (99% - 99%)  Wt(kg): --Vital Signs Last 24 Hrs  T(C): 36.2 (13 Oct 2018 13:15), Max: 36.5 (13 Oct 2018 05:04)  T(F): 97.1 (13 Oct 2018 13:15), Max: 97.7 (13 Oct 2018 05:04)  HR: 79 (13 Oct 2018 13:15) (68 - 79)  BP: 142/95 (13 Oct 2018 13:15) (103/53 - 142/95)  BP(mean): --  RR: 18 (13 Oct 2018 13:15) (16 - 18)  SpO2: 99% (12 Oct 2018 22:51) (99% - 99%)      10-12-18 @ 07:01  -  10-13-18 @ 07:00  --------------------------------------------------------  IN: 0 mL / OUT: 50 mL / NET: -50 mL        PHYSICAL EXAM:  GENERAL: appearing unwell, temporal wasting  PSYCH: no agitation, baseline mentation  NERVOUS SYSTEM:  Alert & Oriented X3, no new focal deficits  PULMONARY: Clear to percussion bilaterally; No rales, rhonchi, wheezing, or rubs  CARDIOVASCULAR: Regular rate and rhythm; No murmurs, rubs, or gallops  GI: more distended, not guarding  EXTREMITIES:  2+ Peripheral Pulses, No clubbing, cyanosis, or edema  SKIN a bit dry    Consultant(s) Notes Reviewed:  [x ] YES  [ ] NO    Discussed with Consultants/Other Providers [ x] YES     LABS                          8.2    22.83 )-----------( 261      ( 13 Oct 2018 11:17 )             24.0     10-13    137  |  98  |  89<HH>  ----------------------------<  143<H>  5.0   |  17  |  3.8<H>    Ca    8.3<L>      13 Oct 2018 11:17  Phos  5.3     10-13    TPro  6.2  /  Alb  3.6  /  TBili  3.8<H>  /  DBili  x   /  AST  359<H>  /  ALT  131<H>  /  AlkPhos  668<H>  10-13    ABG - ( 12 Oct 2018 18:27 )  pH, Arterial: 7.37  pH, Blood: x     /  pCO2: 29    /  pO2: 76    / HCO3: 17    / Base Excess: -7.5  /  SaO2: 95                  PT/INR - ( 12 Oct 2018 07:41 )   PT: 16.20 sec;   INR: 1.51 ratio         PTT - ( 12 Oct 2018 07:41 )  PTT:30.4 sec  Lactate Trend        CAPILLARY BLOOD GLUCOSE            RADIOLOGY & ADDITIONAL TESTS:    Imaging Personally Reviewed:  [ ] YES  [ ] NO    HEALTH ISSUES - PROBLEM Dx:

## 2018-10-13 NOTE — PROGRESS NOTE ADULT - ASSESSMENT
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    ·	Creat stable upper 3   ·	patient oliguric for now   ·	consider repeat paracetesis and avoid LVP / give albumin   ·	could not rule out HRS / continue octrotide for now no midodrine / may need RRT       ·	HAG Met Acidosis due to RANULFO  on NA bicarb 650 po q 8 hrs    Will follow

## 2018-10-13 NOTE — PROGRESS NOTE ADULT - SUBJECTIVE AND OBJECTIVE BOX
Nephrology progress note    Patient is seen and examined, events over the last 24 h noted .  Had diarrhea overnight . Could not sleep   no other complaints     Allergies:  No Known Allergies    Hospital Medications:   MEDICATIONS  (STANDING):  aspirin  chewable 81 milliGRAM(s) Oral daily  atorvastatin 10 milliGRAM(s) Oral at bedtime  cefTRIAXone   IVPB 1 Gram(s) IV Intermittent once  chlorhexidine 4% Liquid 1 Application(s) Topical <User Schedule>  docusate sodium 100 milliGRAM(s) Oral three times a day  finasteride 5 milliGRAM(s) Oral daily  heparin  Injectable 5000 Unit(s) SubCutaneous every 8 hours  melatonin 3 milliGRAM(s) Oral at bedtime  metoprolol succinate ER 50 milliGRAM(s) Oral daily  NIFEdipine XL 90 milliGRAM(s) Oral daily  octreotide  Injectable 100 MICROGram(s) SubCutaneous two times a day  pantoprazole    Tablet 40 milliGRAM(s) Oral before breakfast  sodium bicarbonate 650 milliGRAM(s) Oral three times a day  tamsulosin 0.4 milliGRAM(s) Oral at bedtime  traZODone 50 milliGRAM(s) Oral daily        VITALS:  T(F): 97.1 (10-13-18 @ 13:15), Max: 97.7 (10-13-18 @ 05:04)  HR: 79 (10-13-18 @ 13:15)  BP: 142/95 (10-13-18 @ 13:15)  RR: 18 (10-13-18 @ 13:15)  SpO2: 99% (10-12-18 @ 22:51)      10-11 @ :  -  10-12 @ 07:00  --------------------------------------------------------  IN: 300 mL / OUT: 0 mL / NET: 300 mL    10-12 @ :  -  10-13 @ 07:00  --------------------------------------------------------  IN: 0 mL / OUT: 50 mL / NET: -50 mL          PHYSICAL EXAM:  Constitutional: NAD  HEENT: anicteric sclera, oropharynx clear, MMM  Neck: No JVD  Respiratory: CTAB, no wheezes, rales or rhonchi  Cardiovascular: S1, S2, RRR  Gastrointestinal: distended abdomen shifting dullness   Extremities: No cyanosis or clubbing. No peripheral edema  :  No curtis.   Skin: No rashes    LABS:  10-13    137  |  98  |  89<HH>  ----------------------------<  143<H>  5.0   |  17  |  3.8<H>  Creatinine Trend: 3.8<--, 3.7<--, 3.4<--, 3.1<--, 1.7<--, 1.6<--  Ca    8.3<L>      13 Oct 2018 11:17  Phos  5.3     10-13    TPro  6.2  /  Alb  3.6  /  TBili  3.8<H>  /  DBili      /  AST  359<H>  /  ALT  131<H>  /  AlkPhos  668<H>  10-13                          8.2    22.83 )-----------( 261      ( 13 Oct 2018 11:17 )             24.0       Urine Studies:  Urinalysis Basic - ( 10 Oct 2018 12:02 )    Color: Dark Yellow / Appearance: Cloudy / S.020 / pH:   Gluc:  / Ketone: Trace  / Bili: Negative / Urobili: 1.0 mg/dL   Blood:  / Protein: Trace mg/dL / Nitrite: Negative   Leuk Esterase: Small / RBC: 1-2 /HPF / WBC 6-10 /HPF   Sq Epi:  / Non Sq Epi: Occasional /HPF / Bacteria:       Sodium, Random Urine: 20.0 mmoL/L (10-11 @ 10:24)  Creatinine, Random Urine: 20 mg/dL (10-11 @ 10:24)    RADIOLOGY & ADDITIONAL STUDIES:

## 2018-10-13 NOTE — PROGRESS NOTE ADULT - ASSESSMENT
74 yo male with PMH of HTN, BPH, PTSD, DLD, CKD 3, metastatic neuroendocrine tumor of GB (on chemo w/ topotecan) here with worsening abdominal pain secondary to new onset ascites, acute renal failure with metabolic acidosis on CKD3 possibly secondary to hepatorenal syndrome, leukocytosis (stable- likely 2/2 to underlying malignancy)    trend lytes carefully  appreciated renal recs- will treat as hepatorenal syndrome- octreotide/albumin  urine studies/fena 2.7  f/u urine output  s/p IR diagnostic/therapeutic thoracentesis about 4 liter removed  send for appropriate studies for saag and cytology- pending result  maintain active T+S   minimize hepatotoxic and nephrotoxic meds- dosing adjustments for gfr  high risk 2/2 comorbidities  GI eval for worsening hyperbilirubinemia  renal/ IR/ hem/onc f/u's

## 2018-10-14 LAB
ANION GAP SERPL CALC-SCNC: 29 MMOL/L — HIGH (ref 7–14)
BASOPHILS # BLD AUTO: 0.04 K/UL — SIGNIFICANT CHANGE UP (ref 0–0.2)
BASOPHILS NFR BLD AUTO: 0.2 % — SIGNIFICANT CHANGE UP (ref 0–1)
BUN SERPL-MCNC: 98 MG/DL — CRITICAL HIGH (ref 10–20)
CALCIUM SERPL-MCNC: 9.1 MG/DL — SIGNIFICANT CHANGE UP (ref 8.5–10.1)
CHLORIDE SERPL-SCNC: 95 MMOL/L — LOW (ref 98–110)
CO2 SERPL-SCNC: 15 MMOL/L — LOW (ref 17–32)
CREAT SERPL-MCNC: 4.5 MG/DL — CRITICAL HIGH (ref 0.7–1.5)
EOSINOPHIL # BLD AUTO: 0.07 K/UL — SIGNIFICANT CHANGE UP (ref 0–0.7)
EOSINOPHIL NFR BLD AUTO: 0.3 % — SIGNIFICANT CHANGE UP (ref 0–8)
GLUCOSE SERPL-MCNC: 78 MG/DL — SIGNIFICANT CHANGE UP (ref 70–99)
HCT VFR BLD CALC: 25.2 % — LOW (ref 42–52)
HGB BLD-MCNC: 8.4 G/DL — LOW (ref 14–18)
IMM GRANULOCYTES NFR BLD AUTO: 1 % — HIGH (ref 0.1–0.3)
LYMPHOCYTES # BLD AUTO: 0.95 K/UL — LOW (ref 1.2–3.4)
LYMPHOCYTES # BLD AUTO: 3.9 % — LOW (ref 20.5–51.1)
MAGNESIUM SERPL-MCNC: 2.5 MG/DL — HIGH (ref 1.8–2.4)
MCHC RBC-ENTMCNC: 25.6 PG — LOW (ref 27–31)
MCHC RBC-ENTMCNC: 33.3 G/DL — SIGNIFICANT CHANGE UP (ref 32–37)
MCV RBC AUTO: 76.8 FL — LOW (ref 80–94)
MONOCYTES # BLD AUTO: 1.79 K/UL — HIGH (ref 0.1–0.6)
MONOCYTES NFR BLD AUTO: 7.3 % — SIGNIFICANT CHANGE UP (ref 1.7–9.3)
NEUTROPHILS # BLD AUTO: 21.49 K/UL — HIGH (ref 1.4–6.5)
NEUTROPHILS NFR BLD AUTO: 87.3 % — HIGH (ref 42.2–75.2)
NRBC # BLD: 0 /100 WBCS — SIGNIFICANT CHANGE UP (ref 0–0)
PHOSPHATE SERPL-MCNC: 5.8 MG/DL — HIGH (ref 2.1–4.9)
PLATELET # BLD AUTO: 267 K/UL — SIGNIFICANT CHANGE UP (ref 130–400)
POTASSIUM SERPL-MCNC: 5.6 MMOL/L — HIGH (ref 3.5–5)
POTASSIUM SERPL-SCNC: 5.6 MMOL/L — HIGH (ref 3.5–5)
RBC # BLD: 3.28 M/UL — LOW (ref 4.7–6.1)
RBC # FLD: 21.4 % — HIGH (ref 11.5–14.5)
SODIUM SERPL-SCNC: 139 MMOL/L — SIGNIFICANT CHANGE UP (ref 135–146)
WBC # BLD: 24.58 K/UL — HIGH (ref 4.8–10.8)
WBC # FLD AUTO: 24.58 K/UL — HIGH (ref 4.8–10.8)

## 2018-10-14 RX ORDER — SODIUM BICARBONATE 1 MEQ/ML
1300 SYRINGE (ML) INTRAVENOUS EVERY 8 HOURS
Qty: 0 | Refills: 0 | Status: DISCONTINUED | OUTPATIENT
Start: 2018-10-14 | End: 2018-10-18

## 2018-10-14 RX ORDER — METRONIDAZOLE 500 MG
500 TABLET ORAL EVERY 8 HOURS
Qty: 0 | Refills: 0 | Status: DISCONTINUED | OUTPATIENT
Start: 2018-10-14 | End: 2018-10-19

## 2018-10-14 RX ORDER — CIPROFLOXACIN LACTATE 400MG/40ML
200 VIAL (ML) INTRAVENOUS EVERY 24 HOURS
Qty: 0 | Refills: 0 | Status: DISCONTINUED | OUTPATIENT
Start: 2018-10-14 | End: 2018-10-19

## 2018-10-14 RX ORDER — ALBUMIN HUMAN 25 %
50 VIAL (ML) INTRAVENOUS
Qty: 0 | Refills: 0 | Status: COMPLETED | OUTPATIENT
Start: 2018-10-14 | End: 2018-10-14

## 2018-10-14 RX ORDER — FUROSEMIDE 40 MG
40 TABLET ORAL ONCE
Qty: 0 | Refills: 0 | Status: COMPLETED | OUTPATIENT
Start: 2018-10-14 | End: 2018-10-14

## 2018-10-14 RX ADMIN — SENNA PLUS 1 TABLET(S): 8.6 TABLET ORAL at 11:15

## 2018-10-14 RX ADMIN — Medication 50 MILLILITER(S): at 03:00

## 2018-10-14 RX ADMIN — Medication 50 MILLILITER(S): at 18:51

## 2018-10-14 RX ADMIN — HEPARIN SODIUM 5000 UNIT(S): 5000 INJECTION INTRAVENOUS; SUBCUTANEOUS at 15:42

## 2018-10-14 RX ADMIN — Medication 650 MILLIGRAM(S): at 15:43

## 2018-10-14 RX ADMIN — Medication 40 MILLIGRAM(S): at 15:41

## 2018-10-14 RX ADMIN — OCTREOTIDE ACETATE 100 MICROGRAM(S): 200 INJECTION, SOLUTION INTRAVENOUS; SUBCUTANEOUS at 06:30

## 2018-10-14 RX ADMIN — Medication 81 MILLIGRAM(S): at 11:15

## 2018-10-14 RX ADMIN — CHLORHEXIDINE GLUCONATE 1 APPLICATION(S): 213 SOLUTION TOPICAL at 06:29

## 2018-10-14 RX ADMIN — Medication 50 MILLILITER(S): at 01:02

## 2018-10-14 RX ADMIN — CEFTRIAXONE 100 GRAM(S): 500 INJECTION, POWDER, FOR SOLUTION INTRAMUSCULAR; INTRAVENOUS at 11:15

## 2018-10-14 RX ADMIN — TRAMADOL HYDROCHLORIDE 25 MILLIGRAM(S): 50 TABLET ORAL at 17:33

## 2018-10-14 RX ADMIN — FINASTERIDE 5 MILLIGRAM(S): 5 TABLET, FILM COATED ORAL at 11:15

## 2018-10-14 RX ADMIN — Medication 50 MILLIGRAM(S): at 11:15

## 2018-10-14 RX ADMIN — Medication 50 MILLILITER(S): at 19:49

## 2018-10-14 RX ADMIN — Medication 1300 MILLIGRAM(S): at 21:55

## 2018-10-14 RX ADMIN — Medication 50 MILLILITER(S): at 18:39

## 2018-10-14 RX ADMIN — TAMSULOSIN HYDROCHLORIDE 0.4 MILLIGRAM(S): 0.4 CAPSULE ORAL at 21:56

## 2018-10-14 RX ADMIN — ATORVASTATIN CALCIUM 10 MILLIGRAM(S): 80 TABLET, FILM COATED ORAL at 21:56

## 2018-10-14 RX ADMIN — Medication 50 MILLILITER(S): at 20:02

## 2018-10-14 RX ADMIN — Medication 50 MILLILITER(S): at 02:11

## 2018-10-14 RX ADMIN — Medication 3 MILLIGRAM(S): at 21:55

## 2018-10-14 RX ADMIN — PANTOPRAZOLE SODIUM 40 MILLIGRAM(S): 20 TABLET, DELAYED RELEASE ORAL at 06:25

## 2018-10-14 RX ADMIN — Medication 100 MILLIGRAM(S): at 21:51

## 2018-10-14 RX ADMIN — Medication 100 MILLIGRAM(S): at 17:25

## 2018-10-14 RX ADMIN — HEPARIN SODIUM 5000 UNIT(S): 5000 INJECTION INTRAVENOUS; SUBCUTANEOUS at 06:29

## 2018-10-14 RX ADMIN — Medication 650 MILLIGRAM(S): at 06:25

## 2018-10-14 RX ADMIN — Medication 50 MILLILITER(S): at 19:34

## 2018-10-14 RX ADMIN — Medication 50 MILLILITER(S): at 00:00

## 2018-10-14 RX ADMIN — Medication 50 MILLILITER(S): at 19:20

## 2018-10-14 NOTE — PROGRESS NOTE ADULT - SUBJECTIVE AND OBJECTIVE BOX
ARACELIS RODRIGUEZ  73y  Male      Patient is a 73y old  Male who presents with a chief complaint of Abdominal Distention (14 Oct 2018 12:38)      INTERVAL HPI/OVERNIGHT EVENTS: feeling better s/p curtis. less abdominal distention      REVIEW OF SYSTEMS:  as above  All other review of systems negative    T(C): 36.1 (10-14-18 @ 13:14), Max: 36.7 (10-13-18 @ 21:26)  HR: 77 (10-14-18 @ 13:14) (67 - 77)  BP: 108/52 (10-14-18 @ 13:14) (108/52 - 108/61)  RR: 18 (10-14-18 @ 13:14) (18 - 81)  SpO2: --  Wt(kg): --Vital Signs Last 24 Hrs  T(C): 36.1 (14 Oct 2018 13:14), Max: 36.7 (13 Oct 2018 21:26)  T(F): 97 (14 Oct 2018 13:14), Max: 98 (13 Oct 2018 21:26)  HR: 77 (14 Oct 2018 13:14) (67 - 77)  BP: 108/52 (14 Oct 2018 13:14) (108/52 - 108/61)  BP(mean): --  RR: 18 (14 Oct 2018 13:14) (18 - 81)  SpO2: --      10-13-18 @ 07:01  -  10-14-18 @ 07:00  --------------------------------------------------------  IN: 300 mL / OUT: 0 mL / NET: 300 mL    10-14-18 @ 07:01  -  10-14-18 @ 18:18  --------------------------------------------------------  IN: 0 mL / OUT: 10 mL / NET: -10 mL        PHYSICAL EXAM:  GENERAL: NAD  PSYCH: no agitation, baseline mentation  NERVOUS SYSTEM:  Alert & Oriented X3, no new focal deficits  PULMONARY: Clear to percussion bilaterally; No rales, rhonchi, wheezing, or rubs  CARDIOVASCULAR: Regular rate and rhythm; No murmurs, rubs, or gallops  GI: soft, moderately distended/abnormal contour as prior   curtis about 40 cc yellow urine no sediment  EXTREMITIES: +1 LE edema b/l    Consultant(s) Notes Reviewed:  [x ] YES  [ ] NO    Discussed with Consultants/Other Providers [ x] YES     LABS                          8.4    24.58 )-----------( 267      ( 14 Oct 2018 08:17 )             25.2     10-14    139  |  95<L>  |  98<HH>  ----------------------------<  78  5.6<H>   |  15<L>  |  4.5<HH>    Ca    9.1      14 Oct 2018 08:17  Phos  5.8     10-14  Mg     2.5     10-14    TPro  6.2  /  Alb  3.6  /  TBili  3.8<H>  /  DBili  x   /  AST  359<H>  /  ALT  131<H>  /  AlkPhos  668<H>  10-13    ABG - ( 12 Oct 2018 18:27 )  pH, Arterial: 7.37  pH, Blood: x     /  pCO2: 29    /  pO2: 76    / HCO3: 17    / Base Excess: -7.5  /  SaO2: 95                    Lactate Trend        CAPILLARY BLOOD GLUCOSE            RADIOLOGY & ADDITIONAL TESTS:    Imaging Personally Reviewed:  [ ] YES  [ ] NO    HEALTH ISSUES - PROBLEM Dx:

## 2018-10-14 NOTE — PROGRESS NOTE ADULT - ASSESSMENT
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    ·	RANULFO rule out ATN vs HRS   ·	creat up today   ·	patient oliguric for now   ·	consider repeat paracetesis and avoid LVP / give albumin   ·	insert curtis catheter and give lasix 40 IV one time   ·	could not rule out HRS / continue octrotide for now no midodrine / may need RRT discussed with patient       ·	HAG Met Acidosis due to RANULFO  on NA bicarb 650 po q 8 hrs increase to 1300 mg poq8h     Will follow  D/W hospitalist

## 2018-10-14 NOTE — PROGRESS NOTE ADULT - ASSESSMENT
74 yo male with PMH of HTN, BPH, PTSD, DLD, CKD 3, metastatic neuroendocrine tumor of GB (on chemo w/ topotecan) here with worsening abdominal pain secondary to new onset ascites, acute renal failure with metabolic acidosis on CKD3 possibly secondary to hepatorenal syndrome, leukocytosis (stable- likely 2/2 to underlying malignancy)    trend lytes carefully  appreciated renal recs- will treat as hepatorenal syndrome- octreotide/albumin  placing curtis, monitor u/o  giving lasix  increasing po bicarb per renal recommendation  s/p IR diagnostic/therapeutic thoracentesis about 4 liter removed  send for appropriate studies for saag and cytology- pending result  maintain active T+S   minimize hepatotoxic and nephrotoxic meds- dosing adjustments for gfr  high risk 2/2 comorbidities  GI eval for worsening hyperbilirubinemia appreciated- changing abx to cipro/flagyl rather than ceftriaxone  may end up needing HD  possible IR tomorrow

## 2018-10-14 NOTE — CONSULT NOTE ADULT - ASSESSMENT
72 yo M with NE metastatic Ca of the GB with constipation and a mixed pattern (hepatocellular/cholestatic) transaminitis. likely etiology, primarily of tumor burden exacerbated by many other possible etiologies (ischemic hepatopathy, DILI, cholestasis of sepsis, choledocholithiasis, venus thrombi).    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 sepsis (leukocytosis without evident focus)  6)RANULFO- likely ATN or HRS if ascites confirmed of portal HTN    rec:  1)Tap ascites and send fluid for eval: OBTAIN: albumin, total protein, cell count AT LEAST may include culture, cytology, AFB stain  2)US of the liver with vasculature of the liver (doppler)  3)CLD work up: viral hep pannel, TEDDY, ASMA, AMA, s. ceruloplasmin, ferritin, % transferrin, SPEP.  4)Unclear if HRS treatment is warranted at this time.  5)Avoid hapatotoxic meds (ceftriaxone can cause cholestasis)  6)Add Miralax 17 gm per day if constipation to recur (it takes 5 days to have evident effects)    Will follow 74 yo M with NE metastatic Ca of the GB with constipation and a mixed pattern (hepatocellular/cholestatic) transaminitis. likely etiology, primarily of tumor burden exacerbated by many other possible etiologies (ischemic hepatopathy, DILI, cholestasis of sepsis, choledocholithiasis, venus thrombi).    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 sepsis (leukocytosis without evident focus)  6)RANULFO- likely ATN, since there is no evidence of CLD , unlikely to be HRS    rec:  1)Tap ascites and send fluid for eval: OBTAIN: albumin, total protein, cell count AT LEAST may include culture, cytology, AFB stain  2)US of the liver with vasculature of the liver (doppler)  3)CLD work up: viral hep pannel, TEDDY, ASMA, AMA, s. ceruloplasmin, ferritin, % transferrin, SPEP.  4) Vit K supplementation for 3 days, then repeat INR  5)Avoid hepatotoxic meds (ceftriaxone can cause cholestasis)  6)Add Miralax 17 gm per day if constipation to recur (it takes 5 days to have evident effects)    Will follow 74 yo M with NE metastatic Ca of the GB with constipation and a mixed pattern (hepatocellular/cholestatic) transaminitis. likely etiology, primarily of tumor burden exacerbated by many other possible etiologies (ischemic hepatopathy, DILI, cholestasis of sepsis, choledocholithiasis, venus thrombi).    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 sepsis (leukocytosis without evident focus)  6)RANULFO- likely ATN, since there is no evidence of CLD , unlikely to be HRS    rec:  1)Tap ascites and send fluid for eval: OBTAIN: albumin, total protein, cell count AT LEAST may include culture, cytology, AFB stain  2)US of the liver with vasculature of the liver (doppler)  3)CLD work up: viral hep pannel, TEDDY, ASMA, AMA, s. ceruloplasmin, ferritin, % transferrin, SPEP.  4) Vit K supplementation for 3 days, then repeat INR  5)Avoid hepatotoxic meds (ceftriaxone can cause cholestasis)  6)Add Miralax 17 gm per day if constipation to recur (it takes 5 days to have evident effects)    Will follow

## 2018-10-14 NOTE — CONSULT NOTE ADULT - SUBJECTIVE AND OBJECTIVE BOX
Gastroenterology Consultation    74yo Male with deranged LFTs  Patient is a 73y old  Male who presents with a chief complaint of Abdominal Distention (13 Oct 2018 20:01)    HPI:  This is a 73yoM with PMH of HTN, BPH, PTSD, DLD, CKD 3, metastatic neuroendocrine tumor of GB (on chemo w/ topotecan)  presents for abd bloating and discomfort x 5 days. Pt was just discharged from the hospital and reports sx started after discharge.  He initially had constipation after starting oxycontin and reports that despite now having bowel movements every other day, he is having worsening abd distension (It looks like I'm 9 months pregnant).  He denies pain but reports severe discomfort. (10 Oct 2018 23:08)      GI Hx:    Previous colonoscopy(ies):  Previous EGD(s):  Family Hx:  Social Hx:    REVIEW OF SYSTEMS  General:  No weight loss, fevers, or chills.  Eyes:  No reported pain or visual changes  ENT:  No sore throat or runny nose.  NECK: No stiffness or lymphadenopathy  CV:  No chest pain or palpitations.  Resp:  No shortness of breath, cough, wheezing or hemoptysis  GI:  No abdominal pain, nausea, vomiting, dysphagia, diarrhea or constipation. No rectal bleeding, melena, or hematemesis.  :  No dysuria, urinary incontinence or hematuria.  Muscle:  No aches or weakness  Neuro:  No tingling, numbness or vertigo  Psych:  No mood problems or irritability.  Endocrine:  No polyuria, polydipsia or cold/heat intolerance  Heme:  No ecchymosis or easy bruisability  All other review of systems is negative unless indicated above.    PHYSICAL EXAM:  GENERAL: AAOx3, no acute distress.  HEAD:  Atraumatic, Normocephalic  EYES: EOMI, PERRLA, conjunctiva and sclera clear  NECK: Supple, no JVD or thyromegaly  NODES: No palpable cervical or supraclavicular lymphadenopathy   CHEST/LUNG: Clear to auscultation bilaterally; No wheeze, rhonchi, or rales  HEART: Regular rate and rhythm; normal S1, S2, No murmurs.  ABDOMEN: Soft, nontender, nondistended; Bowel sounds present, no abdominal bruit, masses, or hepatosplenomegaly  EXTREMITIES:  2+ Peripheral Pulses. No clubbing, cyanosis, or edema  PSYCH:  Cooperative and appropriate, normal affect.  NEUROLOGY: No asterixis or tremor. Motor and sensory functions grossly intact  SKIN: Intact, no jaundice  EXTREMITIES: warm, no periph edema.  Rectal exam: not done.    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  BPH (benign prostatic hyperplasia)  PTSD (post-traumatic stress disorder)  Hypertension  Dyslipidemia  CKD (chronic kidney disease), stage III  No significant past surgical history    Home meds:  aspirin 81 mg oral tablet, chewable: 1 tab(s) orally once a day  Colace 100 mg oral capsule: 1 cap(s) orally 3 times a day  Dulcolax Stool Softener 100 mg oral capsule: 1 cap(s) orally 2 times a day  finasteride 1 mg oral tablet: 1 tab(s) orally once a day  Flomax 0.4 mg oral capsule: 1 cap(s) orally once a day  Lipitor 10 mg oral tablet: 1 tab(s) orally once a day  MiraLax oral powder for reconstitution: 1 packet(s) orally once a day  Nifedical XL: 90 milligram(s) orally once a day  oxyCODONE 10 mg oral tablet: 1 tab(s) orally every 6 hours  Senna 8.6 mg oral tablet: 1 tab(s) orally once a day (at bedtime)  Toprol-XL 50 mg oral tablet, extended release: 1 tab(s) orally once a day  traZODone 50 mg oral tablet: 1 tab(s) orally once a day      Allergies: No Known Allergies    Current meds:   aspirin  chewable 81 milliGRAM(s) Oral daily  atorvastatin 10 milliGRAM(s) Oral at bedtime  cefTRIAXone   IVPB      cefTRIAXone   IVPB 1 Gram(s) IV Intermittent every 24 hours  chlorhexidine 4% Liquid 1 Application(s) Topical <User Schedule>  docusate sodium 100 milliGRAM(s) Oral three times a day  finasteride 5 milliGRAM(s) Oral daily  heparin  Injectable 5000 Unit(s) SubCutaneous every 8 hours  melatonin 3 milliGRAM(s) Oral at bedtime  metoprolol succinate ER 50 milliGRAM(s) Oral daily  octreotide  Injectable 100 MICROGram(s) SubCutaneous two times a day  ondansetron Injectable 4 milliGRAM(s) IV Push every 6 hours PRN  pantoprazole    Tablet 40 milliGRAM(s) Oral before breakfast  senna 1 Tablet(s) Oral daily  senna 1 Tablet(s) Oral at bedtime  sodium bicarbonate 650 milliGRAM(s) Oral three times a day  tamsulosin 0.4 milliGRAM(s) Oral at bedtime  traMADol 25 milliGRAM(s) Oral two times a day PRN  traZODone 50 milliGRAM(s) Oral daily        Physical Examination:  T(C): 35.7 (10-14-18 @ 05:36), Max: 36.7 (10-13-18 @ 21:26)  HR: 67 (10-14-18 @ 05:36) (67 - 79)  BP: 108/57 (10-14-18 @ 05:36) (108/57 - 142/95)  RR: 81 (10-14-18 @ 05:36) (18 - 81)  SpO2: 100% (10-13-18 @ 07:40) (100% - 100%)      Data:                        8.2    22.83 )-----------( 261      ( 13 Oct 2018 11:17 )             24.0     MCV 75.9 (10-13-18)    RDW 20.3 (10-13-18)    HGB trend:  8.2  10-13-18 @ 11:17  8.4  10-12-18 @ 07:41  8.6  10-11-18 @ 08:10        10-13    137  |  98  |  89<HH>  ----------------------------<  143<H>  5.0   |  17  |  3.8<H>    Ca    8.3<L>      13 Oct 2018 11:17  Phos  5.3     10-13    TPro  6.2  /  Alb  3.6  /  TBili  3.8<H>  /  DBili  x   /  AST  359<H>  /  ALT  131<H>  /  AlkPhos  668<H>  10-13    Liver panel trend:  TBili 3.8   /      /      /   AlkP 668   /   Tptn 6.2   /   Alb 3.6    /   DBili --      10-13  TBili 2.2   /      /      /   AlkP 938   /   Tptn 6.1   /   Alb 2.7    /   DBili --      10-11  TBili 1.9   /      /      /   AlkP 1023   /   Tptn 6.7   /   Alb 2.8    /   DBili --      10-10    PT/INR - ( 12 Oct 2018 07:41 )   PT: 16.20 sec;   INR: 1.51 ratio    PTT - ( 12 Oct 2018 07:41 )  PTT:30.4 sec    < from: CT Abdomen and Pelvis No Cont (10.10.18 @ 17:46) >  FINDINGS:    LOWER CHEST: Mild bibasilar atelectasis.     HEPATOBILIARY: Numerous overall unchanged ill-defined bilobar hepatic   metastases, poorly evaluated without intravenous contrast.    SPLEEN: Unremarkable.    PANCREAS: Unremarkable.    ADRENAL GLANDS: Stable nodular thickening of the left adrenal gland.    KIDNEYS: Stable bilateral renal cysts.    ABDOMINOPELVIC NODES: Overall unchanged extensive retroperitoneal   adenopathy, measuring up to 6.8 x 3.5 cm at the level of the renal   arteries.    PELVIC ORGANS: Unremarkable.    PERITONEUM/MESENTERY/BOWEL: Increased moderate to large volume   abdominopelvic ascites. No evidence of bowel obstruction. No free   intraperitoneal air.    BONES/SOFT TISSUES: Degenerative changes of the spine. No suspicious   osseous lesion.    OTHER: Atherosclerotic vascular calcification.      IMPRESSION:     1. Since September 27, 2018, increased moderate to large volume   abdominopelvic ascites.    2. Numerous overall unchanged ill-defined bilobar hepatic metastases.    3. Overall unchanged extensive retroperitoneal adenopathy.    4. Stable nodular thickening of the left adrenal gland.    < end of copied text >        < from: US Abdomen Limited (09.27.18 @ 03:12) >  FINDINGS:    LIVER:  Normal in contour and echogenicity measuring 20 cm in length.   Multiple known liver masses are not well visualized.     GALLBLADDER/BILIARY TREE: The gallbladder is not well-visualized.    Negative sonographic Lin's sign. No intrahepatic biliary ductal   dilatation. The common bile duct measures 6 mm, which is normal.     PANCREAS:  Visualized head and body are unremarkable. Remainder obscured   by overlyingbowel gas.    KIDNEY:  Right kidney measures 11.4 cm in length multiple cysts measuring   up to 2.9 cm. No hydronephrosis, calculi or solid mass.    AORTA/IVC:  Visualized proximal portions unremarkable.    ASCITES:  Partially visualized  perihepaticascites.      IMPRESSION:    Suboptimal evaluation of gallbladder and liver. Multiple liver masses are   not well visualized.     Partially visualized perihepatic ascites.    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 Pt was just discharged from the hospital and reports sx started after discharge.  He initially had constipation after starting oxycontin and reports that despite now having bowel movements every other day, he is having worsening abd distension (It looks like I'm 9 months pregnant).  He denies pain but reports severe discomfort. (10 Oct 2018 23:08)      GI Hx:  74 yo M Hx of metastatic GB NE tumor on chemo admitted for ascites SP large volume paracentesis bu IR on Friday. GI called for transaminitis.  denies bloody BM, melena, emesis.    [facility]- 1 polyp in the past  Previous EGD(s): none on file  Family Hx:2nd degree relative with colon ca  Social Hx: Ex Etoh (does not meet the criteria for excessive etoh use), ex tobacco user, no active illicit drug use.    REVIEW OF SYSTEMS  General:  No weight loss, fevers, or chills.  Eyes:  No reported pain or visual changes  ENT:  No sore throat or runny nose.  NECK: No stiffness or lymphadenopathy  CV:  No chest pain or palpitations.  Resp:  No shortness of breath, cough, wheezing or hemoptysis  GI:  No abdominal pain, nausea, vomiting, dysphagia, diarrhea or constipation. No rectal bleeding, melena, or hematemesis.  :  No dysuria, urinary incontinence or hematuria.  Muscle:  No aches or weakness  Neuro:  No tingling, numbness or vertigo  Psych:  No mood problems or irritability.  Endocrine:  No polyuria, polydipsia or cold/heat intolerance  Heme:  No ecchymosis or easy bruisability  All other review of systems is negative unless indicated above.    PHYSICAL EXAM:  GENERAL: AAOx3, no acute distress.  HEAD:  Atraumatic, Normocephalic  EYES: EOMI, PERRLA, conjunctiva and sclera clear  NECK: Supple, no JVD or thyromegaly  NODES: No palpable cervical or supraclavicular lymphadenopathy   CHEST/LUNG: Clear to auscultation bilaterally; No wheeze, rhonchi, or rales  HEART: Regular rate and rhythm; normal S1, S2, No murmurs.  ABDOMEN: Soft, nontender, nondistended; Bowel sounds present, no abdominal bruit, masses, or hepatosplenomegaly  EXTREMITIES:  2+ Peripheral Pulses. No clubbing, cyanosis, or edema  PSYCH:  Cooperative and appropriate, normal affect.  NEUROLOGY: No asterixis or tremor. Motor and sensory functions grossly intact  SKIN: Intact, no jaundice  EXTREMITIES: warm, no periph edema.  Rectal exam: not done.    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  BPH (benign prostatic hyperplasia)  PTSD (post-traumatic stress disorder)  Hypertension  Dyslipidemia  CKD (chronic kidney disease), stage III  No significant past surgical history    Home meds:  aspirin 81 mg oral tablet, chewable: 1 tab(s) orally once a day  Colace 100 mg oral capsule: 1 cap(s) orally 3 times a day  Dulcolax Stool Softener 100 mg oral capsule: 1 cap(s) orally 2 times a day  finasteride 1 mg oral tablet: 1 tab(s) orally once a day  Flomax 0.4 mg oral capsule: 1 cap(s) orally once a day  Lipitor 10 mg oral tablet: 1 tab(s) orally once a day  MiraLax oral powder for reconstitution: 1 packet(s) orally once a day  Nifedical XL: 90 milligram(s) orally once a day  oxyCODONE 10 mg oral tablet: 1 tab(s) orally every 6 hours  Senna 8.6 mg oral tablet: 1 tab(s) orally once a day (at bedtime)  Toprol-XL 50 mg oral tablet, extended release: 1 tab(s) orally once a day  traZODone 50 mg oral tablet: 1 tab(s) orally once a day      Allergies: No Known Allergies    Current meds:   aspirin  chewable 81 milliGRAM(s) Oral daily  atorvastatin 10 milliGRAM(s) Oral at bedtime  cefTRIAXone   IVPB      cefTRIAXone   IVPB 1 Gram(s) IV Intermittent every 24 hours  chlorhexidine 4% Liquid 1 Application(s) Topical <User Schedule>  docusate sodium 100 milliGRAM(s) Oral three times a day  finasteride 5 milliGRAM(s) Oral daily  heparin  Injectable 5000 Unit(s) SubCutaneous every 8 hours  melatonin 3 milliGRAM(s) Oral at bedtime  metoprolol succinate ER 50 milliGRAM(s) Oral daily  octreotide  Injectable 100 MICROGram(s) SubCutaneous two times a day  ondansetron Injectable 4 milliGRAM(s) IV Push every 6 hours PRN  pantoprazole    Tablet 40 milliGRAM(s) Oral before breakfast  senna 1 Tablet(s) Oral daily  senna 1 Tablet(s) Oral at bedtime  sodium bicarbonate 650 milliGRAM(s) Oral three times a day  tamsulosin 0.4 milliGRAM(s) Oral at bedtime  traMADol 25 milliGRAM(s) Oral two times a day PRN  traZODone 50 milliGRAM(s) Oral daily        Physical Examination:  T(C): 35.7 (10-14-18 @ 05:36), Max: 36.7 (10-13-18 @ 21:26)  HR: 67 (10-14-18 @ 05:36) (67 - 79)  BP: 108/57 (10-14-18 @ 05:36) (108/57 - 142/95)  RR: 81 (10-14-18 @ 05:36) (18 - 81)  SpO2: 100% (10-13-18 @ 07:40) (100% - 100%)      Data:                        8.2    22.83 )-----------( 261      ( 13 Oct 2018 11:17 )             24.0     MCV 75.9 (10-13-18)    RDW 20.3 (10-13-18)    HGB trend:  8.2  10-13-18 @ 11:17  8.4  10-12-18 @ 07:41  8.6  10-11-18 @ 08:10        10-13    137  |  98  |  89<HH>  ----------------------------<  143<H>  5.0   |  17  |  3.8<H>    Ca    8.3<L>      13 Oct 2018 11:17  Phos  5.3     10-13    TPro  6.2  /  Alb  3.6  /  TBili  3.8<H>  /  DBili  x   /  AST  359<H>  /  ALT  131<H>  /  AlkPhos  668<H>  10-13    Liver panel trend:  TBili 3.8   /      /      /   AlkP 668   /   Tptn 6.2   /   Alb 3.6    /   DBili --      10-13  TBili 2.2   /      /      /   AlkP 938   /   Tptn 6.1   /   Alb 2.7    /   DBili --      10-11  TBili 1.9   /      /      /   AlkP 1023   /   Tptn 6.7   /   Alb 2.8    /   DBili --      10-10    PT/INR - ( 12 Oct 2018 07:41 )   PT: 16.20 sec;   INR: 1.51 ratio    PTT - ( 12 Oct 2018 07:41 )  PTT:30.4 sec    < from: CT Abdomen and Pelvis No Cont (10.10.18 @ 17:46) >  FINDINGS:    LOWER CHEST: Mild bibasilar atelectasis.     HEPATOBILIARY: Numerous overall unchanged ill-defined bilobar hepatic   metastases, poorly evaluated without intravenous contrast.    SPLEEN: Unremarkable.    PANCREAS: Unremarkable.    ADRENAL GLANDS: Stable nodular thickening of the left adrenal gland.    KIDNEYS: Stable bilateral renal cysts.    ABDOMINOPELVIC NODES: Overall unchanged extensive retroperitoneal   adenopathy, measuring up to 6.8 x 3.5 cm at the level of the renal   arteries.    PELVIC ORGANS: Unremarkable.    PERITONEUM/MESENTERY/BOWEL: Increased moderate to large volume   abdominopelvic ascites. No evidence of bowel obstruction. No free   intraperitoneal air.    BONES/SOFT TISSUES: Degenerative changes of the spine. No suspicious   osseous lesion.    OTHER: Atherosclerotic vascular calcification.      IMPRESSION:     1. Since September 27, 2018, increased moderate to large volume   abdominopelvic ascites.    2. Numerous overall unchanged ill-defined bilobar hepatic metastases.    3. Overall unchanged extensive retroperitoneal adenopathy.    4. Stable nodular thickening of the left adrenal gland.    < end of copied text >        < from: US Abdomen Limited (09.27.18 @ 03:12) >  FINDINGS:    LIVER:  Normal in contour and echogenicity measuring 20 cm in length.   Multiple known liver masses are not well visualized.     GALLBLADDER/BILIARY TREE: The gallbladder is not well-visualized.    Negative sonographic Lin's sign. No intrahepatic biliary ductal   dilatation. The common bile duct measures 6 mm, which is normal.     PANCREAS:  Visualized head and body are unremarkable. Remainder obscured   by overlyingbowel gas.    KIDNEY:  Right kidney measures 11.4 cm in length multiple cysts measuring   up to 2.9 cm. No hydronephrosis, calculi or solid mass.    AORTA/IVC:  Visualized proximal portions unremarkable.    ASCITES:  Partially visualized  perihepaticascites.      IMPRESSION:    Suboptimal evaluation of gallbladder and liver. Multiple liver masses are   not well visualized.     Partially visualized perihepatic ascites.    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 Pt was just discharged from the hospital and reports sx started after discharge.  He initially had constipation after starting oxycontin and reports that despite now having bowel movements every other day, he is having worsening abd distension (It looks like I'm 9 months pregnant).  He denies pain but reports severe discomfort. (10 Oct 2018 23:08)      GI Hx:  72 yo M Hx of metastatic GB NE tumor on chemo admitted for ascites SP large volume paracentesis bu IR on Friday. GI called for transaminitis.  denies bloody BM, melena, emesis.  Pt received 7 cycles of carboplatin and etoposide initiated in January 2018.  He then switched to FOLFIRI and was s/p 13 cycles in July 2018.  He was switched to Topotecan in July 2018 and received 2 cycles, last one was end of August 2018. He was supposed to receive 3rd cycle in late September 2018.  Oncology on board.  reports decreased urine output.    [facility]- 1 polyp in the past  Previous EGD(s): none on file  Family Hx: 2nd degree relative with colon ca  Social Hx: Ex Etoh (does not meet the criteria for excessive etoh use), ex tobacco user, no active illicit drug use.    REVIEW OF SYSTEMS  General:  No weight loss, fevers, or chills.  Eyes:  No reported pain or visual changes  ENT:  No sore throat or runny nose.  NECK: No stiffness or lymphadenopathy  CV:  No chest pain or palpitations.  Resp:  No shortness of breath, cough, wheezing or hemoptysis  GI:  No abdominal pain, nausea, vomiting, dysphagia, diarrhea or constipation. No rectal bleeding, melena, or hematemesis.  :  No dysuria, urinary incontinence or hematuria.  Muscle:  No aches or weakness  Neuro:  No tingling, numbness or vertigo  Psych:  No mood problems or irritability.  Endocrine:  No polyuria, polydipsia or cold/heat intolerance  Heme:  No ecchymosis or easy bruisability  All other review of systems is negative unless indicated above.    PHYSICAL EXAM:  GENERAL: AAOx3, no acute distress.  HEAD:  Atraumatic, Normocephalic  EYES: EOMI, PERRLA, conjunctiva and sclera clear  NECK: Supple, no JVD or thyromegaly  NODES: No palpable cervical or supraclavicular lymphadenopathy   CHEST/LUNG: Clear to auscultation bilaterally; No wheeze, rhonchi, or rales  HEART: Regular rate and rhythm; normal S1, S2, No murmurs.  ABDOMEN: Soft, nontender, nondistended; Bowel sounds present, no abdominal bruit, masses, or hepatosplenomegaly  EXTREMITIES:  2+ Peripheral Pulses. No clubbing, cyanosis, or edema  PSYCH:  Cooperative and appropriate, normal affect.  NEUROLOGY: No asterixis or tremor. Motor and sensory functions grossly intact  SKIN: Intact, no jaundice  EXTREMITIES: warm, no periph edema.  Rectal exam: not done.    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  BPH (benign prostatic hyperplasia)  PTSD (post-traumatic stress disorder)  Hypertension  Dyslipidemia  CKD (chronic kidney disease), stage III  No significant past surgical history    Home meds:  aspirin 81 mg oral tablet, chewable: 1 tab(s) orally once a day  Colace 100 mg oral capsule: 1 cap(s) orally 3 times a day  Dulcolax Stool Softener 100 mg oral capsule: 1 cap(s) orally 2 times a day  finasteride 1 mg oral tablet: 1 tab(s) orally once a day  Flomax 0.4 mg oral capsule: 1 cap(s) orally once a day  Lipitor 10 mg oral tablet: 1 tab(s) orally once a day  MiraLax oral powder for reconstitution: 1 packet(s) orally once a day  Nifedical XL: 90 milligram(s) orally once a day  oxyCODONE 10 mg oral tablet: 1 tab(s) orally every 6 hours  Senna 8.6 mg oral tablet: 1 tab(s) orally once a day (at bedtime)  Toprol-XL 50 mg oral tablet, extended release: 1 tab(s) orally once a day  traZODone 50 mg oral tablet: 1 tab(s) orally once a day      Allergies: No Known Allergies    Current meds:   aspirin  chewable 81 milliGRAM(s) Oral daily  atorvastatin 10 milliGRAM(s) Oral at bedtime  cefTRIAXone   IVPB      cefTRIAXone   IVPB 1 Gram(s) IV Intermittent every 24 hours  chlorhexidine 4% Liquid 1 Application(s) Topical <User Schedule>  docusate sodium 100 milliGRAM(s) Oral three times a day  finasteride 5 milliGRAM(s) Oral daily  heparin  Injectable 5000 Unit(s) SubCutaneous every 8 hours  melatonin 3 milliGRAM(s) Oral at bedtime  metoprolol succinate ER 50 milliGRAM(s) Oral daily  octreotide  Injectable 100 MICROGram(s) SubCutaneous two times a day  ondansetron Injectable 4 milliGRAM(s) IV Push every 6 hours PRN  pantoprazole    Tablet 40 milliGRAM(s) Oral before breakfast  senna 1 Tablet(s) Oral daily  senna 1 Tablet(s) Oral at bedtime  sodium bicarbonate 650 milliGRAM(s) Oral three times a day  tamsulosin 0.4 milliGRAM(s) Oral at bedtime  traMADol 25 milliGRAM(s) Oral two times a day PRN  traZODone 50 milliGRAM(s) Oral daily        Physical Examination:  T(C): 35.7 (10-14-18 @ 05:36), Max: 36.7 (10-13-18 @ 21:26)  HR: 67 (10-14-18 @ 05:36) (67 - 79)  BP: 108/57 (10-14-18 @ 05:36) (108/57 - 142/95)  RR: 81 (10-14-18 @ 05:36) (18 - 81)  SpO2: 100% (10-13-18 @ 07:40) (100% - 100%)      Data:                        8.2    22.83 )-----------( 261      ( 13 Oct 2018 11:17 )             24.0     MCV 75.9 (10-13-18)    RDW 20.3 (10-13-18)    HGB trend:  8.2  10-13-18 @ 11:17  8.4  10-12-18 @ 07:41  8.6  10-11-18 @ 08:10        10-13    137  |  98  |  89<HH>  ----------------------------<  143<H>  5.0   |  17  |  3.8<H>    Ca    8.3<L>      13 Oct 2018 11:17  Phos  5.3     10-13    TPro  6.2  /  Alb  3.6  /  TBili  3.8<H>  /  DBili  x   /  AST  359<H>  /  ALT  131<H>  /  AlkPhos  668<H>  10-13    Liver panel trend:  TBili 3.8   /      /      /   AlkP 668   /   Tptn 6.2   /   Alb 3.6    /   DBili --      10-13  TBili 2.2   /      /      /   AlkP 938   /   Tptn 6.1   /   Alb 2.7    /   DBili --      10-11  TBili 1.9   /      /      /   AlkP 1023   /   Tptn 6.7   /   Alb 2.8    /   DBili --      10-10    PT/INR - ( 12 Oct 2018 07:41 )   PT: 16.20 sec;   INR: 1.51 ratio    PTT - ( 12 Oct 2018 07:41 )  PTT:30.4 sec    < from: CT Abdomen and Pelvis No Cont (10.10.18 @ 17:46) >  FINDINGS:    LOWER CHEST: Mild bibasilar atelectasis.     HEPATOBILIARY: Numerous overall unchanged ill-defined bilobar hepatic   metastases, poorly evaluated without intravenous contrast.    SPLEEN: Unremarkable.    PANCREAS: Unremarkable.    ADRENAL GLANDS: Stable nodular thickening of the left adrenal gland.    KIDNEYS: Stable bilateral renal cysts.    ABDOMINOPELVIC NODES: Overall unchanged extensive retroperitoneal   adenopathy, measuring up to 6.8 x 3.5 cm at the level of the renal   arteries.    PELVIC ORGANS: Unremarkable.    PERITONEUM/MESENTERY/BOWEL: Increased moderate to large volume   abdominopelvic ascites. No evidence of bowel obstruction. No free   intraperitoneal air.    BONES/SOFT TISSUES: Degenerative changes of the spine. No suspicious   osseous lesion.    OTHER: Atherosclerotic vascular calcification.      IMPRESSION:     1. Since September 27, 2018, increased moderate to large volume   abdominopelvic ascites.    2. Numerous overall unchanged ill-defined bilobar hepatic metastases.    3. Overall unchanged extensive retroperitoneal adenopathy.    4. Stable nodular thickening of the left adrenal gland.    < end of copied text >        < from: US Abdomen Limited (09.27.18 @ 03:12) >  FINDINGS:    LIVER:  Normal in contour and echogenicity measuring 20 cm in length.   Multiple known liver masses are not well visualized.     GALLBLADDER/BILIARY TREE: The gallbladder is not well-visualized.    Negative sonographic Lin's sign. No intrahepatic biliary ductal   dilatation. The common bile duct measures 6 mm, which is normal.     PANCREAS:  Visualized head and body are unremarkable. Remainder obscured   by overlyingbowel gas.    KIDNEY:  Right kidney measures 11.4 cm in length multiple cysts measuring   up to 2.9 cm. No hydronephrosis, calculi or solid mass.    AORTA/IVC:  Visualized proximal portions unremarkable.    ASCITES:  Partially visualized  perihepaticascites.      IMPRESSION:    Suboptimal evaluation of gallbladder and liver. Multiple liver masses are   not well visualized.     Partially visualized perihepatic ascites.    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 Pt was just discharged from the hospital and reports sx started after discharge.  He initially had constipation after starting oxycontin and reports that despite now having bowel movements every other day, he is having worsening abd distension (It looks like I'm 9 months pregnant).  He denies pain but reports severe discomfort. (10 Oct 2018 23:08)      GI Hx:  74 yo M Hx of metastatic GB NE tumor on chemo admitted for ascites SP large volume paracentesis bu IR on Friday. GI called for transaminitis.  denies bloody BM, melena, emesis.  Pt received 7 cycles of carboplatin and etoposide initiated in January 2018.  He then switched to FOLFIRI and was s/p 13 cycles in July 2018.  He was switched to Topotecan in July 2018 and received 2 cycles, last one was end of August 2018. He was supposed to receive 3rd cycle in late September 2018.  Oncology on board.  reports decreased urine output.    Previous colonoscopy(ies):[facility] a long time ago- 1 polyp in the past  Previous EGD(s): none on file  Family Hx: 2nd degree relative with colon ca  Social Hx: Ex Etoh (does not meet the criteria for excessive etoh use), ex tobacco user, no active illicit drug use.    REVIEW OF SYSTEMS  General:  + weight loss, no fevers, or chills.  Eyes:  No reported pain or visual changes  ENT:  No sore throat or runny nose.  NECK: No stiffness or lymphadenopathy  CV:  No chest pain or palpitations.  Resp:  No shortness of breath, cough, wheezing or hemoptysis  GI:  + abdominal pain, + nausea, no vomiting, dysphagia, diarrhea or constipation. No rectal bleeding, melena, or hematemesis.  :  No dysuria, urinary incontinence or hematuria.  Muscle:  + aches and weakness  Endocrine:  No polyuria, polydipsia   Heme:  No ecchymosis or easy bruisability  All other review of systems is negative unless indicated above.    PHYSICAL EXAM:  T(C): 35.7 (10-14-18 @ 05:36), Max: 36.7 (10-13-18 @ 21:26)  HR: 67 (10-14-18 @ 05:36) (67 - 79)  BP: 108/57 (10-14-18 @ 05:36) (108/57 - 142/95)  RR: 81 (10-14-18 @ 05:36) (18 - 81)  SpO2: 100% (10-13-18 @ 07:40) (100% - 100%)    GENERAL: AAOx3, no acute distress.  HEAD:  Atraumatic, Normocephalic  EYES: EOMI, PERRLA, + jaundice  NECK: Supple, no JVD or thyromegaly  NODES: No palpable cervical or supraclavicular lymphadenopathy   CHEST/LUNG: Clear to auscultation bilaterally  HEART: Regular rate and rhythm; normal S1, S2  ABDOMEN: Soft, nontender, nondistended; Bowel sounds present, no abdominal bruit, masses, or hepatosplenomegaly  EXTREMITIES:  2+ Peripheral Pulses. No clubbing, cyanosis, or edema  SKIN: Intact, + jaundice  EXTREMITIES: warm, no periph edema.  Rectal exam: not done.    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  BPH (benign prostatic hyperplasia)  PTSD (post-traumatic stress disorder)  Hypertension  Dyslipidemia  CKD (chronic kidney disease), stage III  No significant past surgical history    Home meds:  aspirin 81 mg oral tablet, chewable: 1 tab(s) orally once a day  Colace 100 mg oral capsule: 1 cap(s) orally 3 times a day  Dulcolax Stool Softener 100 mg oral capsule: 1 cap(s) orally 2 times a day  finasteride 1 mg oral tablet: 1 tab(s) orally once a day  Flomax 0.4 mg oral capsule: 1 cap(s) orally once a day  Lipitor 10 mg oral tablet: 1 tab(s) orally once a day  MiraLax oral powder for reconstitution: 1 packet(s) orally once a day  Nifedical XL: 90 milligram(s) orally once a day  oxyCODONE 10 mg oral tablet: 1 tab(s) orally every 6 hours  Senna 8.6 mg oral tablet: 1 tab(s) orally once a day (at bedtime)  Toprol-XL 50 mg oral tablet, extended release: 1 tab(s) orally once a day  traZODone 50 mg oral tablet: 1 tab(s) orally once a day      Allergies: No Known Allergies    Current meds:   aspirin  chewable 81 milliGRAM(s) Oral daily  atorvastatin 10 milliGRAM(s) Oral at bedtime  cefTRIAXone   IVPB      cefTRIAXone   IVPB 1 Gram(s) IV Intermittent every 24 hours  chlorhexidine 4% Liquid 1 Application(s) Topical <User Schedule>  docusate sodium 100 milliGRAM(s) Oral three times a day  finasteride 5 milliGRAM(s) Oral daily  heparin  Injectable 5000 Unit(s) SubCutaneous every 8 hours  melatonin 3 milliGRAM(s) Oral at bedtime  metoprolol succinate ER 50 milliGRAM(s) Oral daily  octreotide  Injectable 100 MICROGram(s) SubCutaneous two times a day  ondansetron Injectable 4 milliGRAM(s) IV Push every 6 hours PRN  pantoprazole    Tablet 40 milliGRAM(s) Oral before breakfast  senna 1 Tablet(s) Oral daily  senna 1 Tablet(s) Oral at bedtime  sodium bicarbonate 650 milliGRAM(s) Oral three times a day  tamsulosin 0.4 milliGRAM(s) Oral at bedtime  traMADol 25 milliGRAM(s) Oral two times a day PRN  traZODone 50 milliGRAM(s) Oral daily            Data:                        8.2    22.83 )-----------( 261      ( 13 Oct 2018 11:17 )             24.0     MCV 75.9 (10-13-18)    RDW 20.3 (10-13-18)    HGB trend:  8.2  10-13-18 @ 11:17  8.4  10-12-18 @ 07:41  8.6  10-11-18 @ 08:10        10-13    137  |  98  |  89<HH>  ----------------------------<  143<H>  5.0   |  17  |  3.8<H>    Ca    8.3<L>      13 Oct 2018 11:17  Phos  5.3     10-13    TPro  6.2  /  Alb  3.6  /  TBili  3.8<H>  /  DBili  x   /  AST  359<H>  /  ALT  131<H>  /  AlkPhos  668<H>  10-13    Liver panel trend:  TBili 3.8   /      /      /   AlkP 668   /   Tptn 6.2   /   Alb 3.6    /   DBili --      10-13  TBili 2.2   /      /      /   AlkP 938   /   Tptn 6.1   /   Alb 2.7    /   DBili --      10-11  TBili 1.9   /      /      /   AlkP 1023   /   Tptn 6.7   /   Alb 2.8    /   DBili --      10-10    PT/INR - ( 12 Oct 2018 07:41 )   PT: 16.20 sec;   INR: 1.51 ratio    PTT - ( 12 Oct 2018 07:41 )  PTT:30.4 sec    < from: CT Abdomen and Pelvis No Cont (10.10.18 @ 17:46) >  FINDINGS:    LOWER CHEST: Mild bibasilar atelectasis.     HEPATOBILIARY: Numerous overall unchanged ill-defined bilobar hepatic   metastases, poorly evaluated without intravenous contrast.    SPLEEN: Unremarkable.    PANCREAS: Unremarkable.    ADRENAL GLANDS: Stable nodular thickening of the left adrenal gland.    KIDNEYS: Stable bilateral renal cysts.    ABDOMINOPELVIC NODES: Overall unchanged extensive retroperitoneal   adenopathy, measuring up to 6.8 x 3.5 cm at the level of the renal   arteries.    PELVIC ORGANS: Unremarkable.    PERITONEUM/MESENTERY/BOWEL: Increased moderate to large volume   abdominopelvic ascites. No evidence of bowel obstruction. No free   intraperitoneal air.    BONES/SOFT TISSUES: Degenerative changes of the spine. No suspicious   osseous lesion.    OTHER: Atherosclerotic vascular calcification.      IMPRESSION:     1. Since September 27, 2018, increased moderate to large volume   abdominopelvic ascites.    2. Numerous overall unchanged ill-defined bilobar hepatic metastases.    3. Overall unchanged extensive retroperitoneal adenopathy.    4. Stable nodular thickening of the left adrenal gland.    < end of copied text >        < from: US Abdomen Limited (09.27.18 @ 03:12) >  FINDINGS:    LIVER:  Normal in contour and echogenicity measuring 20 cm in length.   Multiple known liver masses are not well visualized.     GALLBLADDER/BILIARY TREE: The gallbladder is not well-visualized.    Negative sonographic Lin's sign. No intrahepatic biliary ductal   dilatation. The common bile duct measures 6 mm, which is normal.     PANCREAS:  Visualized head and body are unremarkable. Remainder obscured   by overlyingbowel gas.    KIDNEY:  Right kidney measures 11.4 cm in length multiple cysts measuring   up to 2.9 cm. No hydronephrosis, calculi or solid mass.    AORTA/IVC:  Visualized proximal portions unremarkable.    ASCITES:  Partially visualized  perihepaticascites.      IMPRESSION:    Suboptimal evaluation of gallbladder and liver. Multiple liver masses are   not well visualized.     Partially visualized perihepatic ascites.    < end of copied text >

## 2018-10-14 NOTE — PROGRESS NOTE ADULT - SUBJECTIVE AND OBJECTIVE BOX
Nephrology progress note    Patient is seen and examined, events over the last 24 h noted .  complained of SOB  said he is not oliguric   abdomen is more distended     Allergies:  No Known Allergies    Hospital Medications:   MEDICATIONS  (STANDING):  aspirin  chewable 81 milliGRAM(s) Oral daily  atorvastatin 10 milliGRAM(s) Oral at bedtime    cefTRIAXone   IVPB 1 Gram(s) IV Intermittent every 24 hours  chlorhexidine 4% Liquid 1 Application(s) Topical <User Schedule>  docusate sodium 100 milliGRAM(s) Oral three times a day  finasteride 5 milliGRAM(s) Oral daily  heparin  Injectable 5000 Unit(s) SubCutaneous every 8 hours  melatonin 3 milliGRAM(s) Oral at bedtime  metoprolol succinate ER 50 milliGRAM(s) Oral daily  octreotide  Injectable 100 MICROGram(s) SubCutaneous two times a day  pantoprazole    Tablet 40 milliGRAM(s) Oral before breakfast  sodium bicarbonate 650 milliGRAM(s) Oral three times a day  tamsulosin 0.4 milliGRAM(s) Oral at bedtime  traZODone 50 milliGRAM(s) Oral daily        VITALS:  T(F): 96.3 (10-14-18 @ 05:36), Max: 98 (10-13-18 @ 21:26)  HR: 67 (10-14-18 @ 05:36)  BP: 108/57 (10-14-18 @ 05:36)  RR: 81 (10-14-18 @ 05:36)    10-12 @ 07:  -  10-13 @ 07:00  --------------------------------------------------------  IN: 0 mL / OUT: 50 mL / NET: -50 mL    10-13 @ 07:  -  10-14 @ 07:00  --------------------------------------------------------  IN: 300 mL / OUT: 0 mL / NET: 300 mL          PHYSICAL EXAM:  Constitutional: NAD  HEENT: anicteric sclera, oropharynx clear, MMM  Neck: No JVD  Respiratory: CTAB, no wheezes, rales or rhonchi  Cardiovascular: S1, S2, RRR  Gastrointestinal: distended with shifting dullness   Extremities: No cyanosis or clubbing. plus one peripheral edema   :  No curtis.   Skin: No rashes    LABS:  10-14    139  |  95<L>  |  98<HH>  ----------------------------<  78  5.6<H>   |  15<L>  |  4.5<HH>    Ca    9.1      14 Oct 2018 08:17  Phos  5.8     10-14  Mg     2.5     10-14    TPro  6.2  /  Alb  3.6  /  TBili  3.8<H>  /  DBili      /  AST  359<H>  /  ALT  131<H>  /  AlkPhos  668<H>  10-13                          8.4    24.58 )-----------( 267      ( 14 Oct 2018 08:17 )             25.2       Urine Studies:  Urinalysis Basic - ( 10 Oct 2018 12:02 )    Color: Dark Yellow / Appearance: Cloudy / S.020 / pH:   Gluc:  / Ketone: Trace  / Bili: Negative / Urobili: 1.0 mg/dL   Blood:  / Protein: Trace mg/dL / Nitrite: Negative   Leuk Esterase: Small / RBC: 1-2 /HPF / WBC 6-10 /HPF   Sq Epi:  / Non Sq Epi: Occasional /HPF / Bacteria:       Sodium, Random Urine: 20.0 mmoL/L (10-11 @ 10:24)  Creatinine, Random Urine: 20 mg/dL (10-11 @ 10:24)    RADIOLOGY & ADDITIONAL STUDIES:

## 2018-10-15 LAB
ANION GAP SERPL CALC-SCNC: 30 MMOL/L — HIGH (ref 7–14)
APTT BLD: 64.3 SEC — HIGH (ref 27–39.2)
BASOPHILS # BLD AUTO: 0.03 K/UL — SIGNIFICANT CHANGE UP (ref 0–0.2)
BASOPHILS NFR BLD AUTO: 0.1 % — SIGNIFICANT CHANGE UP (ref 0–1)
BLD GP AB SCN SERPL QL: SIGNIFICANT CHANGE UP
BUN SERPL-MCNC: 103 MG/DL — CRITICAL HIGH (ref 10–20)
CALCIUM SERPL-MCNC: 9 MG/DL — SIGNIFICANT CHANGE UP (ref 8.5–10.1)
CHLORIDE SERPL-SCNC: 95 MMOL/L — LOW (ref 98–110)
CO2 SERPL-SCNC: 14 MMOL/L — LOW (ref 17–32)
CREAT SERPL-MCNC: 4.4 MG/DL — CRITICAL HIGH (ref 0.7–1.5)
EOSINOPHIL # BLD AUTO: 0.02 K/UL — SIGNIFICANT CHANGE UP (ref 0–0.7)
EOSINOPHIL NFR BLD AUTO: 0.1 % — SIGNIFICANT CHANGE UP (ref 0–8)
GLUCOSE SERPL-MCNC: 70 MG/DL — SIGNIFICANT CHANGE UP (ref 70–99)
HCT VFR BLD CALC: 23 % — LOW (ref 42–52)
HGB BLD-MCNC: 7.9 G/DL — LOW (ref 14–18)
IMM GRANULOCYTES NFR BLD AUTO: 1.1 % — HIGH (ref 0.1–0.3)
INR BLD: 2.1 RATIO — HIGH (ref 0.65–1.3)
LYMPHOCYTES # BLD AUTO: 0.78 K/UL — LOW (ref 1.2–3.4)
LYMPHOCYTES # BLD AUTO: 3 % — LOW (ref 20.5–51.1)
MAGNESIUM SERPL-MCNC: 2.5 MG/DL — HIGH (ref 1.8–2.4)
MCHC RBC-ENTMCNC: 26 PG — LOW (ref 27–31)
MCHC RBC-ENTMCNC: 34.3 G/DL — SIGNIFICANT CHANGE UP (ref 32–37)
MCV RBC AUTO: 75.7 FL — LOW (ref 80–94)
MONOCYTES # BLD AUTO: 2.26 K/UL — HIGH (ref 0.1–0.6)
MONOCYTES NFR BLD AUTO: 8.7 % — SIGNIFICANT CHANGE UP (ref 1.7–9.3)
NEUTROPHILS # BLD AUTO: 22.54 K/UL — HIGH (ref 1.4–6.5)
NEUTROPHILS NFR BLD AUTO: 87 % — HIGH (ref 42.2–75.2)
NON-GYNECOLOGICAL CYTOLOGY STUDY: SIGNIFICANT CHANGE UP
NRBC # BLD: 0 /100 WBCS — SIGNIFICANT CHANGE UP (ref 0–0)
PHOSPHATE SERPL-MCNC: 5.8 MG/DL — HIGH (ref 2.1–4.9)
PLATELET # BLD AUTO: 210 K/UL — SIGNIFICANT CHANGE UP (ref 130–400)
POTASSIUM SERPL-MCNC: 5 MMOL/L — SIGNIFICANT CHANGE UP (ref 3.5–5)
POTASSIUM SERPL-SCNC: 5 MMOL/L — SIGNIFICANT CHANGE UP (ref 3.5–5)
PROTHROM AB SERPL-ACNC: 24 SEC — HIGH (ref 9.95–12.87)
RBC # BLD: 3.04 M/UL — LOW (ref 4.7–6.1)
RBC # FLD: 22.1 % — HIGH (ref 11.5–14.5)
SODIUM SERPL-SCNC: 139 MMOL/L — SIGNIFICANT CHANGE UP (ref 135–146)
TRANSFERRIN SERPL-MCNC: 55 MG/DL — LOW (ref 200–360)
TYPE + AB SCN PNL BLD: SIGNIFICANT CHANGE UP
WBC # BLD: 25.91 K/UL — HIGH (ref 4.8–10.8)
WBC # FLD AUTO: 25.91 K/UL — HIGH (ref 4.8–10.8)

## 2018-10-15 PROCEDURE — 93970 EXTREMITY STUDY: CPT | Mod: 26

## 2018-10-15 RX ORDER — SEVELAMER CARBONATE 2400 MG/1
800 POWDER, FOR SUSPENSION ORAL EVERY 8 HOURS
Qty: 0 | Refills: 0 | Status: DISCONTINUED | OUTPATIENT
Start: 2018-10-15 | End: 2018-10-18

## 2018-10-15 RX ORDER — HEPARIN SODIUM 5000 [USP'U]/ML
1000 INJECTION INTRAVENOUS; SUBCUTANEOUS
Qty: 25000 | Refills: 0 | Status: DISCONTINUED | OUTPATIENT
Start: 2018-10-15 | End: 2018-10-16

## 2018-10-15 RX ORDER — TRAMADOL HYDROCHLORIDE 50 MG/1
50 TABLET ORAL
Qty: 0 | Refills: 0 | Status: DISCONTINUED | OUTPATIENT
Start: 2018-10-15 | End: 2018-10-16

## 2018-10-15 RX ORDER — SODIUM CHLORIDE 9 MG/ML
1000 INJECTION, SOLUTION INTRAVENOUS
Qty: 0 | Refills: 0 | Status: DISCONTINUED | OUTPATIENT
Start: 2018-10-15 | End: 2018-10-16

## 2018-10-15 RX ADMIN — Medication 50 MILLIGRAM(S): at 05:36

## 2018-10-15 RX ADMIN — SODIUM CHLORIDE 60 MILLILITER(S): 9 INJECTION, SOLUTION INTRAVENOUS at 11:39

## 2018-10-15 RX ADMIN — Medication 1300 MILLIGRAM(S): at 05:37

## 2018-10-15 RX ADMIN — Medication 81 MILLIGRAM(S): at 11:29

## 2018-10-15 RX ADMIN — CHLORHEXIDINE GLUCONATE 1 APPLICATION(S): 213 SOLUTION TOPICAL at 05:40

## 2018-10-15 RX ADMIN — Medication 100 MILLIGRAM(S): at 17:19

## 2018-10-15 RX ADMIN — Medication 50 MILLIGRAM(S): at 11:29

## 2018-10-15 RX ADMIN — Medication 100 MILLIGRAM(S): at 13:51

## 2018-10-15 RX ADMIN — PANTOPRAZOLE SODIUM 40 MILLIGRAM(S): 20 TABLET, DELAYED RELEASE ORAL at 05:36

## 2018-10-15 RX ADMIN — HEPARIN SODIUM 10 UNIT(S)/HR: 5000 INJECTION INTRAVENOUS; SUBCUTANEOUS at 11:30

## 2018-10-15 RX ADMIN — SEVELAMER CARBONATE 800 MILLIGRAM(S): 2400 POWDER, FOR SUSPENSION ORAL at 21:32

## 2018-10-15 RX ADMIN — Medication 100 MILLIGRAM(S): at 21:30

## 2018-10-15 RX ADMIN — OCTREOTIDE ACETATE 100 MICROGRAM(S): 200 INJECTION, SOLUTION INTRAVENOUS; SUBCUTANEOUS at 17:18

## 2018-10-15 RX ADMIN — SEVELAMER CARBONATE 800 MILLIGRAM(S): 2400 POWDER, FOR SUSPENSION ORAL at 15:15

## 2018-10-15 RX ADMIN — Medication 1300 MILLIGRAM(S): at 13:51

## 2018-10-15 RX ADMIN — FINASTERIDE 5 MILLIGRAM(S): 5 TABLET, FILM COATED ORAL at 11:29

## 2018-10-15 RX ADMIN — ATORVASTATIN CALCIUM 10 MILLIGRAM(S): 80 TABLET, FILM COATED ORAL at 21:31

## 2018-10-15 RX ADMIN — OCTREOTIDE ACETATE 100 MICROGRAM(S): 200 INJECTION, SOLUTION INTRAVENOUS; SUBCUTANEOUS at 06:58

## 2018-10-15 RX ADMIN — Medication 3 MILLIGRAM(S): at 21:31

## 2018-10-15 RX ADMIN — Medication 100 MILLIGRAM(S): at 05:39

## 2018-10-15 RX ADMIN — TAMSULOSIN HYDROCHLORIDE 0.4 MILLIGRAM(S): 0.4 CAPSULE ORAL at 21:31

## 2018-10-15 RX ADMIN — Medication 1300 MILLIGRAM(S): at 21:32

## 2018-10-15 NOTE — PROGRESS NOTE ADULT - ASSESSMENT
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  # RANULFO ; medical compartment syndrome vs HRS   # ct noted, large ascites s/p paracentesis, no hydro  # UO noted  # if repeated bicarbonate remains < 15, start 1/2 ns with 75 meq of bicarbonate at 60 cc/h  # continue sodium bicarbonate drip  # oncology notes appreciated  # start renagel 1 tablet q 8  # continue octreotide  # no acute indication for RRT, however if no improvement , might need HD, will get palliative care evaluation , ? goals of care  # overall prognosis poor

## 2018-10-15 NOTE — PROGRESS NOTE ADULT - ASSESSMENT
ADMISSION SUMMARY  Patient is a 73y old Male who presents with a chief complaint of Abdominal Distention (15 Oct 2018 09:20)  Currently admitted to medicine with the primary diagnosis of Malignant ascites  Hospital course has been complicated by _______.       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     ASSESSMENT & PLAN  # Ascites s/p diagnostic/therapeutic paracentesis (10/12)  -Possibly malignant, hx of heavy alcohol use  -Initial CT abd/pel (10/10)- moderate to large volume ascites. S/p paracentesis by IR on (10/12) with 4L of madyson color fluid successfully drained, 120cc of fluid removed and sent for culture and cytology - f/u  -Repeat U/S (10/14) showing moderate ascites - per IR, rec repeat ultrasound in 2 days to see if enough fluid to tap as not enough fluid right now  -GI consult appreciated - f/u CLD workup results viral hep pannel, TEDDY, ASMA, AMA, s. ceruloplasmin, ferritin, % transferrin, SPEP    # ) RANULFO on CKDIII-worsening   -Cr 3.7 10/12, will recheck today   -did not respond to IVF, likely Hepatorenal Syndrome  -octreotide  Injectable 100 MICROGram(s) Subcutaneous two times a day  -Albumin IV initiated, patient weights 85 kg, dose for HRS 1 gm/kg, pharmacy consulted and as each bag contains 12.5 grams he will need 6 bags at 50cc of 25% albumin administered 10/11, will administer again today per Nephro (2 days)  -Fena 2.7   -ABG - ( 12 Oct 2018 18:27 ) pH, Arterial: 7.37  pH, Blood: x     /  pCO2: 29    /  pO2: 76    / HCO3: 17    / Base Excess: -7.5  /  SaO2: 95    #) Hx of Hypertension  -Metoprolol succinate ER 50 milliGRAM(s) Oral daily  -NIFEdipine XL 90 milliGRAM(s) Oral daily    #) Hyperbilirubinemia, Transaminitis  -f/u cmp   -GI consult placed    # ) Constipation  -docusate sodium 100 milliGRAM(s) Oral daily  -senna 1 Tablet(s) Oral daily    #) Mild hypotension  -120/58 today  -Nephro: May stop Nifedipine, may need to add Midodrine    # ) DLD  -Atorvastatin 10 milliGRAM(s) Oral at bedtime    #) Hx of BPH  -Finasteride 5 milliGRAM(s) Oral daily  -Tamsulosin 0.4 milliGRAM(s) Oral at bedtime    #) Hx of Insomnia  -Melatonin 3 milliGRAM(s) Oral at bedtime  -TraZODone 50 milliGRAM(s) Oral daily    DVT Prophylaxis- Heparin sub q     GI Prophylaxis -Protonix ADMISSION SUMMARY  72 y/o M with PMH of HTN, BPH, PTSD, DLD, CKD 3 (40), 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     ASSESSMENT & PLAN  # Ascites s/p diagnostic/therapeutic paracentesis (10/12) - recurring  -Possibly malignant, hx of heavy alcohol use, neuroendocrine tumor  -Initial CT abd/pel (10/10)- moderate to large volume ascites. S/p paracentesis by IR on (10/12) with 4L of madyson color fluid successfully drained, 120cc of fluid removed and sent for culture and cytology  -Repeat U/S (10/14) showing moderate ascites - per IR, rec repeat ultrasound in 2 days to see if enough fluid to tap as not enough fluid right now  -GI consult appreciated - f/u CLD workup results viral hep pannel, TEDDY, ASMA, AMA, s. ceruloplasmin, ferritin, % transferrin, SPEP  -Will repeat    # ) RANULFO on CKDIII-worsening   -Cr 3.7 10/12, will recheck today   -did not respond to IVF, likely Hepatorenal Syndrome  -octreotide  Injectable 100 MICROGram(s) Subcutaneous two times a day  -Albumin IV initiated, patient weights 85 kg, dose for HRS 1 gm/kg, pharmacy consulted and as each bag contains 12.5 grams he will need 6 bags at 50cc of 25% albumin administered 10/11, will administer again today per Nephro (2 days)  -Fena 2.7   -ABG - ( 12 Oct 2018 18:27 ) pH, Arterial: 7.37  pH, Blood: x     /  pCO2: 29    /  pO2: 76    / HCO3: 17    / Base Excess: -7.5  /  SaO2: 95    #) Hx of Hypertension  -Metoprolol succinate ER 50 milliGRAM(s) Oral daily  -NIFEdipine XL 90 milliGRAM(s) Oral daily    #) Hyperbilirubinemia, Transaminitis  -f/u cmp   -GI consult placed    # ) Constipation  -docusate sodium 100 milliGRAM(s) Oral daily  -senna 1 Tablet(s) Oral daily    #) Mild hypotension  -120/58 today  -Nephro: May stop Nifedipine, may need to add Midodrine    # ) DLD  -Atorvastatin 10 milliGRAM(s) Oral at bedtime    #) Hx of BPH  -Finasteride 5 milliGRAM(s) Oral daily  -Tamsulosin 0.4 milliGRAM(s) Oral at bedtime    #) Hx of Insomnia  -Melatonin 3 milliGRAM(s) Oral at bedtime  -TraZODone 50 milliGRAM(s) Oral daily    DVT Prophylaxis- Heparin sub q     GI Prophylaxis -Protonix ADMISSION SUMMARY  74 y/o M with PMH of HTN, BPH, PTSD, DLD, CKD 3 (40), 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     ASSESSMENT & PLAN  # Ascites s/p diagnostic/therapeutic paracentesis (10/12) - recurring  -Possibly malignant, hx of heavy alcohol use, neuroendocrine tumor  -Initial CT abd/pel (10/10)- moderate to large volume ascites. S/p paracentesis by IR on (10/12) with 4L of madyson color fluid successfully drained, 120cc of fluid removed and sent for culture and cytology  -Repeat U/S (10/14) showing moderate ascites - per IR, rec repeat ultrasound in 2 days to see if enough fluid to tap as not enough fluid right now  -Will repeat ultrasound on Wednesday or earlier if worsening    # ) RANULFO on CKD III - worsening with high anion gap metabolic acidosis  -Concern for hepatorenal syndrome vs. medical compartment syndrome given ascites. CT abd/pel (10/10) showed no signs of hydronephrosis  -Cr uptrending since admission 3.1 >>> 3.8 > 4.5 > 4.4 with bicarb 17 > 15 > 14 today  -High anion gap metabolic acidosis (ABG from 10/12 pH 7.37, HCO3 17); A today   -Oliguric unimproved with trial of Lasix 40 mg x1  -Nephro following, appreciate recs - is s/p 4 days of Albumin (d/c today), continue octreotide 100 mcg BID, add Renagel 1 tab q8h. No need for RRT now, but if no improvement will need it  -Add 1/2NS with 75 mEq bicarb @ 60 cc/hr per nephro for worsening bicarb  -Continue to monitor lytes daily    # Acute bilateral posterior tibial DVT  -U/S findings as above  -Will start heparin drip @ 1000 units based on weight as per nomogram for anticoagulation inpatient given need for possible procedures and renal failure  -F/u aPTT @ 4pm and adjust PRN    # Hypertension - controlled  -Continue metoprolol succinate ER 50 mg PO daily, hold if SBP <100   -Nifedipine d/c'ed earlier given hypotension - continue to hold    # Hyperbilirubinemia, Transaminitis  -GI consult appreciated - f/u CLD workup results viral hep pannel, TEDDY, ASMA, AMA, s. ceruloplasmin, ferritin, % transferrin, SPEP  -LFT's slightly improved since admission, f/u AM to trend    # Constipation - resolved  -Continue bowel regimen Senna, Colace    # DLD  -Lipitor 10 mg qHS - hold in light of transaminitis ?     # Hx of BPH - stable  -Continue Finasteride 5 mg PO daily, Tamsulosin 0.4 mg PO qHS    # Hx of Insomnia  -Melatonin 3 mg PO qHS  -Trazadone 50 mg PO daily    # DVT Prophylaxis  -Hep drip    # GI Prophylaxis  -Protonix 40 mg PO daily    # Disposition  -Code status: as per discussion with patient today, FULL CODE  -Will get Palliative Care consult to assist with symptom relief

## 2018-10-15 NOTE — PROGRESS NOTE ADULT - SUBJECTIVE AND OBJECTIVE BOX
ARACELIS RODRIGUEZ  73y  Male      Patient is a 73y old  Male who presents with a chief complaint of Abdominal Distention (15 Oct 2018 11:24)      INTERVAL HPI/OVERNIGHT EVENTS: feels somewhat tired today. does not c/o worsening abdominal pain, in fact says he has had some relief ever since curtis placement (even though urine output has not been much), and then again after oral analgesia was given. discussed advanced directives- see below      REVIEW OF SYSTEMS:  as above  All other review of systems negative    T(C): 36.2 (10-15-18 @ 13:11), Max: 36.2 (10-15-18 @ 13:11)  HR: 72 (10-15-18 @ 13:11) (72 - 90)  BP: 134/68 (10-15-18 @ 13:11) (131/60 - 134/68)  RR: 18 (10-15-18 @ 13:11) (18 - 18)  SpO2: 100% (10-14-18 @ 19:30) (100% - 100%)  Wt(kg): --Vital Signs Last 24 Hrs  T(C): 36.2 (15 Oct 2018 13:11), Max: 36.2 (15 Oct 2018 13:11)  T(F): 97.1 (15 Oct 2018 13:11), Max: 97.1 (15 Oct 2018 13:11)  HR: 72 (15 Oct 2018 13:11) (72 - 90)  BP: 134/68 (15 Oct 2018 13:11) (131/60 - 134/68)  BP(mean): --  RR: 18 (15 Oct 2018 13:11) (18 - 18)  SpO2: 100% (14 Oct 2018 19:30) (100% - 100%)      10-14-18 @ 07:01  -  10-15-18 @ 07:00  --------------------------------------------------------  IN: 0 mL / OUT: 210 mL / NET: -210 mL    10-15-18 @ 07:01  -  10-15-18 @ 18:42  --------------------------------------------------------  IN: 0 mL / OUT: 200 mL / NET: -200 mL        PHYSICAL EXAM:  GENERAL: NAD, temporal wasting  PSYCH: no agitation, baseline mentation  NERVOUS SYSTEM:  Alert & Oriented X3, no new focal deficits  PULMONARY: Clear to percussion bilaterally; No rales, rhonchi, wheezing, or rubs  CARDIOVASCULAR: Regular rate and rhythm; No murmurs, rubs, or gallops  GI: rotund, a bit more distended, no peritoneal signs, fluid shift   curtis with minimal madyson urine no sediment  EXTREMITIES:  b/l LE 1+ nonpitting edema  SKIN a bit dry    Consultant(s) Notes Reviewed:  [x ] YES  [ ] NO    Discussed with Consultants/Other Providers [ x] YES     LABS                          7.9    25.91 )-----------( 210      ( 15 Oct 2018 07:37 )             23.0     10-15    139  |  95<L>  |  103<HH>  ----------------------------<  70  5.0   |  14<L>  |  4.4<HH>    Ca    9.0      15 Oct 2018 07:37  Phos  5.8     10-15  Mg     2.5     10-15            Lactate Trend        CAPILLARY BLOOD GLUCOSE            RADIOLOGY & ADDITIONAL TESTS:    Imaging Personally Reviewed:  [ ] YES  [ ] NO    HEALTH ISSUES - PROBLEM Dx:

## 2018-10-15 NOTE — PROGRESS NOTE ADULT - SUBJECTIVE AND OBJECTIVE BOX
Patient is a 73y old  Male who presents with a chief complaint of Abdominal Distention (14 Oct 2018 18:17)      Subjective:      Vital Signs Last 24 Hrs  T(C): 36 (15 Oct 2018 05:17), Max: 36.1 (14 Oct 2018 13:14)  T(F): 96.8 (15 Oct 2018 05:17), Max: 97 (14 Oct 2018 13:14)  HR: 90 (15 Oct 2018 05:17) (77 - 90)  BP: 131/62 (15 Oct 2018 05:17) (108/52 - 131/62)  BP(mean): --  RR: 18 (15 Oct 2018 05:17) (18 - 18)  SpO2: 100% (14 Oct 2018 19:30) (100% - 100%)    PHYSICAL EXAM  General: adult in NAD  HEENT: clear oropharynx, anicteric sclera, pink conjunctiva  Neck: supple  CV: normal S1/S2 with no murmur rubs or gallops  Lungs: positive air movement b/l ant lungs,clear to auscultation, no wheezes, no rales  Abdomen: soft non-tender non-distended, no hepatosplenomegaly  Ext: no clubbing cyanosis or edema  Skin: no rashes and no petechiae  Neuro: alert and oriented X 4, no focal deficits    MEDICATIONS  (STANDING):  aspirin  chewable 81 milliGRAM(s) Oral daily  atorvastatin 10 milliGRAM(s) Oral at bedtime  chlorhexidine 4% Liquid 1 Application(s) Topical <User Schedule>  ciprofloxacin   IVPB 200 milliGRAM(s) IV Intermittent every 24 hours  docusate sodium 100 milliGRAM(s) Oral three times a day  finasteride 5 milliGRAM(s) Oral daily  heparin  Injectable 5000 Unit(s) SubCutaneous every 8 hours  melatonin 3 milliGRAM(s) Oral at bedtime  metoprolol succinate ER 50 milliGRAM(s) Oral daily  metroNIDAZOLE  IVPB 500 milliGRAM(s) IV Intermittent every 8 hours  octreotide  Injectable 100 MICROGram(s) SubCutaneous two times a day  pantoprazole    Tablet 40 milliGRAM(s) Oral before breakfast  senna 1 Tablet(s) Oral daily  senna 1 Tablet(s) Oral at bedtime  sodium bicarbonate 1300 milliGRAM(s) Oral every 8 hours  tamsulosin 0.4 milliGRAM(s) Oral at bedtime  traZODone 50 milliGRAM(s) Oral daily    MEDICATIONS  (PRN):  ondansetron Injectable 4 milliGRAM(s) IV Push every 6 hours PRN Nausea and/or Vomiting  traMADol 50 milliGRAM(s) Oral two times a day PRN Moderate Pain (4 - 6)      LABS:                          8.4    24.58 )-----------( 267      ( 14 Oct 2018 08:17 )             25.2         Mean Cell Volume : 76.8 fL  Mean Cell Hemoglobin : 25.6 pg  Mean Cell Hemoglobin Concentration : 33.3 g/dL  Auto Neutrophil # : 21.49 K/uL  Auto Lymphocyte # : 0.95 K/uL  Auto Monocyte # : 1.79 K/uL  Auto Eosinophil # : 0.07 K/uL  Auto Basophil # : 0.04 K/uL  Auto Neutrophil % : 87.3 %  Auto Lymphocyte % : 3.9 %  Auto Monocyte % : 7.3 %  Auto Eosinophil % : 0.3 %  Auto Basophil % : 0.2 %      Serial CBC's  10-14 @ 08:17  Hct-25.2 / Hgb-8.4 / Plat-267 / RBC-3.28 / WBC-24.58  Serial CBC's  10-13 @ 11:17  Hct-24.0 / Hgb-8.2 / Plat-261 / RBC-3.16 / WBC-22.83  Serial CBC's  10-12 @ 07:41  Hct-24.5 / Hgb-8.4 / Plat-255 / RBC-3.24 / WBC-23.11  Serial CBC's  10-11 @ 08:10  Hct-25.4 / Hgb-8.6 / Plat-280 / RBC-3.34 / WBC-21.86      10-14    139  |  95<L>  |  98<HH>  ----------------------------<  78  5.6<H>   |  15<L>  |  4.5<HH>    Ca    9.1      14 Oct 2018 08:17  Phos  5.8     10-14  Mg     2.5     10-14    TPro  6.2  /  Alb  3.6  /  TBili  3.8<H>  /  DBili  x   /  AST  359<H>  /  ALT  131<H>  /  AlkPhos  668<H>  10-13 Patient is a 73y old  Male who presents with a chief complaint of Abdominal Distention (14 Oct 2018 18:17)    Subjective: Patient feels that ascites has reaccumulated since last paracentesis.  States that paracentesis and curtis catheter have significantly relieved abdominal discomfort.    Vital Signs Last 24 Hrs  T(C): 36 (15 Oct 2018 05:17), Max: 36.1 (14 Oct 2018 13:14)  T(F): 96.8 (15 Oct 2018 05:17), Max: 97 (14 Oct 2018 13:14)  HR: 90 (15 Oct 2018 05:17) (77 - 90)  BP: 131/62 (15 Oct 2018 05:17) (108/52 - 131/62)  BP(mean): --  RR: 18 (15 Oct 2018 05:17) (18 - 18)  SpO2: 100% (14 Oct 2018 19:30) (100% - 100%)    PHYSICAL EXAM  General: adult in NAD, resting comfortably in bed  HEENT: NC/AT  Neck: supple  CV: +S1, +S2  Lungs: positive air movement b/l ant lungs  Abdomen: appears more distended than exam s/p paracentesis, patient denies pain to palpation, +curtis catheter in place draining orange-colored urine  Skin: no rashes and no petechiae  Neuro: alert and oriented X 4, no focal deficits    MEDICATIONS  (STANDING):  aspirin  chewable 81 milliGRAM(s) Oral daily  atorvastatin 10 milliGRAM(s) Oral at bedtime  chlorhexidine 4% Liquid 1 Application(s) Topical <User Schedule>  ciprofloxacin   IVPB 200 milliGRAM(s) IV Intermittent every 24 hours  docusate sodium 100 milliGRAM(s) Oral three times a day  finasteride 5 milliGRAM(s) Oral daily  heparin  Injectable 5000 Unit(s) SubCutaneous every 8 hours  melatonin 3 milliGRAM(s) Oral at bedtime  metoprolol succinate ER 50 milliGRAM(s) Oral daily  metroNIDAZOLE  IVPB 500 milliGRAM(s) IV Intermittent every 8 hours  octreotide  Injectable 100 MICROGram(s) SubCutaneous two times a day  pantoprazole    Tablet 40 milliGRAM(s) Oral before breakfast  senna 1 Tablet(s) Oral daily  senna 1 Tablet(s) Oral at bedtime  sodium bicarbonate 1300 milliGRAM(s) Oral every 8 hours  tamsulosin 0.4 milliGRAM(s) Oral at bedtime  traZODone 50 milliGRAM(s) Oral daily    MEDICATIONS  (PRN):  ondansetron Injectable 4 milliGRAM(s) IV Push every 6 hours PRN Nausea and/or Vomiting  traMADol 50 milliGRAM(s) Oral two times a day PRN Moderate Pain (4 - 6)      LABS:                          8.4    24.58 )-----------( 267      ( 14 Oct 2018 08:17 )             25.2         Mean Cell Volume : 76.8 fL  Mean Cell Hemoglobin : 25.6 pg  Mean Cell Hemoglobin Concentration : 33.3 g/dL  Auto Neutrophil # : 21.49 K/uL  Auto Lymphocyte # : 0.95 K/uL  Auto Monocyte # : 1.79 K/uL  Auto Eosinophil # : 0.07 K/uL  Auto Basophil # : 0.04 K/uL  Auto Neutrophil % : 87.3 %  Auto Lymphocyte % : 3.9 %  Auto Monocyte % : 7.3 %  Auto Eosinophil % : 0.3 %  Auto Basophil % : 0.2 %      Serial CBC's  10-14 @ 08:17  Hct-25.2 / Hgb-8.4 / Plat-267 / RBC-3.28 / WBC-24.58  Serial CBC's  10-13 @ 11:17  Hct-24.0 / Hgb-8.2 / Plat-261 / RBC-3.16 / WBC-22.83  Serial CBC's  10-12 @ 07:41  Hct-24.5 / Hgb-8.4 / Plat-255 / RBC-3.24 / WBC-23.11  Serial CBC's  10-11 @ 08:10  Hct-25.4 / Hgb-8.6 / Plat-280 / RBC-3.34 / WBC-21.86      10-14    139  |  95<L>  |  98<HH>  ----------------------------<  78  5.6<H>   |  15<L>  |  4.5<HH>    Ca    9.1      14 Oct 2018 08:17  Phos  5.8     10-14  Mg     2.5     10-14    TPro  6.2  /  Alb  3.6  /  TBili  3.8<H>  /  DBili  x   /  AST  359<H>  /  ALT  131<H>  /  AlkPhos  668<H>  10-13

## 2018-10-15 NOTE — PROGRESS NOTE ADULT - ASSESSMENT
74 yo male with PMH of HTN, BPH, PTSD, DLD, CKD 3, metastatic neuroendocrine tumor of GB (on chemo w/ topotecan) presents for abdominal bloating and discomfort x 5 days.    1. Abdominal bloating and discomfort-abdominal CT showed moderate to large volume abdominopelvic ascites.  Patient s/p diagnostic and therapeutic paracentesis by IR today.  Patient's pain resolved following paracentesis.  Results of fluid studies pending.  Potential repeat paracentesis today by IR.  2. RANUFLO on CKD 3-concern for hepatorenal syndrome, especially in light of problem 1.  Nephrology is following the patient.  Godinez inserted.  Continue octreotide and bicarbonate.  Patient may require hemodialysis in the future.  3. Metastatic neuroendocrine tumor of gallbladder-patient should follow up with Dr. French as an outpatient once the above issues are addressed. 74 yo male with PMH of HTN, BPH, PTSD, DLD, CKD 3, metastatic neuroendocrine tumor of GB (on chemo w/ topotecan) presents for abdominal bloating and discomfort x 5 days.    1. Abdominal bloating and discomfort-abdominal CT showed moderate to large volume abdominopelvic ascites.  Patient s/p diagnostic and therapeutic paracentesis by IR today.  Patient's pain resolved following paracentesis.  Results of fluid studies pending.  IR planning repeat paracentesis on Wednesday.  If patient is in severe discomfort, offer therapeutic paracentesis today.  2. RANULFO on CKD 3-concern for hepatorenal syndrome, especially in light of problem 1.  Nephrology is following the patient.  Godinez inserted.  Continue octreotide and bicarbonate.  Patient may require hemodialysis in the future.  3. Metastatic neuroendocrine tumor of gallbladder-on palliative chemotherapy with topotecan.  In light of above complications, recommend palliative care evaluation.  4. New B/L posterior tibial DVTs-Patient placed on heparin drip.  Depending on goals of care conversation, transition to coumadin.

## 2018-10-15 NOTE — PROGRESS NOTE ADULT - SUBJECTIVE AND OBJECTIVE BOX
ARACELIS RODRIGUEZ 73y Male  MRN#: [id number]     SUBJECTIVE  Patient is a 73y old Male who presents with a chief complaint of Abdominal Distention (15 Oct 2018 09:20)  Currently admitted to medicine with the primary diagnosis of malignant ascites and acute renal failure    Today is hospital day 5d, and this morning he is feeling significant bloating again. Denies abdominal pain, constipation (last BM yesterday), SOB or LE pain. LE swelling per patient is unimproved.    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  BPH (benign prostatic hyperplasia)  PTSD (post-traumatic stress disorder)  Hypertension  Dyslipidemia  CKD (chronic kidney disease), stage III  No significant past surgical history    ALLERGIES:  No Known Allergies    MEDICATIONS:  STANDING MEDICATIONS  aspirin  chewable 81 milliGRAM(s) Oral daily  atorvastatin 10 milliGRAM(s) Oral at bedtime  chlorhexidine 4% Liquid 1 Application(s) Topical <User Schedule>  ciprofloxacin   IVPB 200 milliGRAM(s) IV Intermittent every 24 hours  docusate sodium 100 milliGRAM(s) Oral three times a day  finasteride 5 milliGRAM(s) Oral daily  heparin  Infusion 1000 Unit(s)/Hr IV Continuous <Continuous>  melatonin 3 milliGRAM(s) Oral at bedtime  metoprolol succinate ER 50 milliGRAM(s) Oral daily  metroNIDAZOLE  IVPB 500 milliGRAM(s) IV Intermittent every 8 hours  octreotide  Injectable 100 MICROGram(s) SubCutaneous two times a day  pantoprazole    Tablet 40 milliGRAM(s) Oral before breakfast  senna 1 Tablet(s) Oral daily  senna 1 Tablet(s) Oral at bedtime  sevelamer hydrochloride 800 milliGRAM(s) Oral every 8 hours  sodium bicarbonate 1300 milliGRAM(s) Oral every 8 hours  sodium chloride 0.45% 1000 milliLiter(s) IV Continuous <Continuous>  tamsulosin 0.4 milliGRAM(s) Oral at bedtime  traZODone 50 milliGRAM(s) Oral daily    PRN MEDICATIONS  ondansetron Injectable 4 milliGRAM(s) IV Push every 6 hours PRN  traMADol 50 milliGRAM(s) Oral two times a day PRN      VITAL SIGNS: Last 24 Hours  T(C): 36 (15 Oct 2018 05:17), Max: 36.1 (14 Oct 2018 13:14)  T(F): 96.8 (15 Oct 2018 05:17), Max: 97 (14 Oct 2018 13:14)  HR: 90 (15 Oct 2018 05:17) (77 - 90)  BP: 131/62 (15 Oct 2018 05:17) (108/52 - 131/62)  BP(mean): --  RR: 18 (15 Oct 2018 05:17) (18 - 18)  SpO2: 100% (14 Oct 2018 19:30) (100% - 100%)    LABS:                        7.9    25.91 )-----------( 210      ( 15 Oct 2018 07:37 )             23.0     10-15    139  |  95<L>  |  103<HH>  ----------------------------<  70  5.0   |  14<L>  |  4.4<HH>    Ca    9.0      15 Oct 2018 07:37  Phos  5.8     10-15  Mg     2.5     10-15                    RADIOLOGY:      PHYSICAL EXAM:    GENERAL: NAD, well-developed, AAOx3  HEENT:  Atraumatic, Normocephalic. EOMI, PERRLA, conjunctiva and sclera clear, No JVD  PULMONARY: Clear to auscultation bilaterally; No wheeze  CARDIOVASCULAR: Regular rate and rhythm; No murmurs, rubs, or gallops  GASTROINTESTINAL: Soft, Nontender, Nondistended; Bowel sounds present  MUSCULOSKELETAL:  2+ Peripheral Pulses, No clubbing, cyanosis, or edema  NEUROLOGY: non-focal  SKIN: No rashes or lesions ARACELIS RODRIGUEZ 73y Male  MRN#: [id number]     SUBJECTIVE  Patient is a 73y old Male who presents with a chief complaint of Abdominal Distention (15 Oct 2018 09:20)  Currently admitted to medicine with the primary diagnosis of malignant ascites and acute renal failure    Today is hospital day 5d, and this morning he is feeling significant bloating again. Denies nausea, vomiting, abdominal pain, constipation (last BM yesterday), SOB or LE pain. LE swelling per patient is unimproved.    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  BPH (benign prostatic hyperplasia)  PTSD (post-traumatic stress disorder)  Hypertension  Dyslipidemia  CKD (chronic kidney disease), stage III  No significant past surgical history    ALLERGIES:  No Known Allergies    MEDICATIONS:  STANDING MEDICATIONS  aspirin  chewable 81 milliGRAM(s) Oral daily  atorvastatin 10 milliGRAM(s) Oral at bedtime  chlorhexidine 4% Liquid 1 Application(s) Topical <User Schedule>  ciprofloxacin   IVPB 200 milliGRAM(s) IV Intermittent every 24 hours  docusate sodium 100 milliGRAM(s) Oral three times a day  finasteride 5 milliGRAM(s) Oral daily  heparin  Infusion 1000 Unit(s)/Hr IV Continuous <Continuous>  melatonin 3 milliGRAM(s) Oral at bedtime  metoprolol succinate ER 50 milliGRAM(s) Oral daily  metroNIDAZOLE  IVPB 500 milliGRAM(s) IV Intermittent every 8 hours  octreotide  Injectable 100 MICROGram(s) SubCutaneous two times a day  pantoprazole    Tablet 40 milliGRAM(s) Oral before breakfast  senna 1 Tablet(s) Oral daily  senna 1 Tablet(s) Oral at bedtime  sevelamer hydrochloride 800 milliGRAM(s) Oral every 8 hours  sodium bicarbonate 1300 milliGRAM(s) Oral every 8 hours  sodium chloride 0.45% 1000 milliLiter(s) IV Continuous <Continuous>  tamsulosin 0.4 milliGRAM(s) Oral at bedtime  traZODone 50 milliGRAM(s) Oral daily    PRN MEDICATIONS  ondansetron Injectable 4 milliGRAM(s) IV Push every 6 hours PRN  traMADol 50 milliGRAM(s) Oral two times a day PRN      VITAL SIGNS: Last 24 Hours  T(C): 36 (15 Oct 2018 05:17), Max: 36.1 (14 Oct 2018 13:14)  T(F): 96.8 (15 Oct 2018 05:17), Max: 97 (14 Oct 2018 13:14)  HR: 90 (15 Oct 2018 05:17) (77 - 90)  BP: 131/62 (15 Oct 2018 05:17) (108/52 - 131/62)  BP(mean): --  RR: 18 (15 Oct 2018 05:17) (18 - 18)  SpO2: 100% (14 Oct 2018 19:30) (100% - 100%)    LABS:                        7.9    25.91 )-----------( 210      ( 15 Oct 2018 07:37 )             23.0     10-15    139  |  95<L>  |  103<HH>  ----------------------------<  70  5.0   |  14<L>  |  4.4<HH>    Ca    9.0      15 Oct 2018 07:37  Phos  5.8     10-15  Mg     2.5     10-15                    RADIOLOGY:  < from: [facility] Duplex Lower Ext Vein Scan, Bilat (10.15.18 @ 10:40) >  Impression:    Posterior tibial vein deep venous thrombosis in bilateral lower   extremities.    < end of copied text >  < from: US Abdomen Limited (10.14.18 @ 22:38) >  IMPRESSION:    Multiple bilobar hepatic masses, as was the case on prior exams.    Moderate ascites, also present on prior exams.    Cholelithiasis.    < end of copied text >      PHYSICAL EXAM:    GENERAL: Well-developed, AAOx3. Appears dyspneic due to discomfort  HEENT: Atraumatic, Normocephalic. EOMI, PERRLA, conjunctiva and sclera clear, No JVD  PULMONARY: Decreased breath sounds at bases. Clear to auscultation bilaterally, no rales appreciated  CARDIOVASCULAR: Regular rate and rhythm; No murmurs, rubs, or gallops  GASTROINTESTINAL: Significant distention. Nontender to palpation, no rebound, no guarding; Bowel sounds present  MUSCULOSKELETAL:  2+ Peripheral Pulses, No clubbing, 1+ pitting edema bilateral LE. No tenderness to palpation.  NEUROLOGY: non-focal  SKIN: No rashes or lesions

## 2018-10-15 NOTE — PROGRESS NOTE ADULT - ASSESSMENT
72 yo male with PMH of HTN, BPH, PTSD, DLD, CKD 3, metastatic neuroendocrine tumor of GB (on chemo w/ topotecan) here with worsening abdominal pain secondary to new onset ascites, acute renal failure with metabolic acidosis on CKD3 possibly secondary to hepatorenal syndrome vs abdominal compartment syndrome?, leukocytosis (stable- likely 2/2 to underlying malignancy), now with new B/L posterior tibial DVTs    trend lytes carefully  starting bicarb drip  maintain curtis, monitor u/o  c/w octreotide, start renagel  appreciated renal f/u  s/p IR diagnostic/therapeutic thoracentesis about 4 liter removed  send for appropriate studies for saag and cytology- pending result- calling lab regarding why not yet resulted  maintain active T+S   minimize hepatotoxic and nephrotoxic meds- dosing adjustments for gfr  GI eval for worsening hyperbilirubinemia appreciated- changed abx to cipro/flagyl rather than ceftriaxone  may end up needing HD  starting therapeutic AC with heparin ggt- monitor ptt  high risk 2/2 comorbidities    advanced directives- FULL code, wants full aggressive medical treatment including life support/ pressors/ vent support/ possible HD  very clear that he wants to be kept alive

## 2018-10-15 NOTE — PROGRESS NOTE ADULT - SUBJECTIVE AND OBJECTIVE BOX
seen and examined  no distress  lying comfortable       Standing Inpatient Medications  aspirin  chewable 81 milliGRAM(s) Oral daily  atorvastatin 10 milliGRAM(s) Oral at bedtime  chlorhexidine 4% Liquid 1 Application(s) Topical <User Schedule>  ciprofloxacin   IVPB 200 milliGRAM(s) IV Intermittent every 24 hours  docusate sodium 100 milliGRAM(s) Oral three times a day  finasteride 5 milliGRAM(s) Oral daily  heparin  Injectable 5000 Unit(s) SubCutaneous every 8 hours  melatonin 3 milliGRAM(s) Oral at bedtime  metoprolol succinate ER 50 milliGRAM(s) Oral daily  metroNIDAZOLE  IVPB 500 milliGRAM(s) IV Intermittent every 8 hours  octreotide  Injectable 100 MICROGram(s) SubCutaneous two times a day  pantoprazole    Tablet 40 milliGRAM(s) Oral before breakfast  senna 1 Tablet(s) Oral daily  senna 1 Tablet(s) Oral at bedtime  sodium bicarbonate 1300 milliGRAM(s) Oral every 8 hours  tamsulosin 0.4 milliGRAM(s) Oral at bedtime  traZODone 50 milliGRAM(s) Oral daily                VITALS/PHYSICAL EXAM  --------------------------------------------------------------------------------  T(C): 36 (10-15-18 @ 05:17), Max: 36.1 (10-14-18 @ 13:14)  HR: 90 (10-15-18 @ 05:17) (77 - 90)  BP: 131/62 (10-15-18 @ 05:17) (108/52 - 131/62)  RR: 18 (10-15-18 @ 05:17) (18 - 18)  SpO2: 100% (10-14-18 @ 19:30) (100% - 100%)  Wt(kg): --        10-14-18 @ 07:01  -  10-15-18 @ 07:00  --------------------------------------------------------  IN: 0 mL / OUT: 210 mL / NET: -210 mL      Physical Exam:  	Gen: NAD  	Pulm:  decrease BS B/L  	CV: S1S2; no rub  	Abd: +distended  	: curtis   	LE:  no edema    LABS/STUDIES  --------------------------------------------------------------------------------              7.9    25.91 >-----------<  210      [10-15-18 @ 07:37]              23.0     139  |  95  |  98  ----------------------------<  78      [10-14-18 @ 08:17]  5.6   |  15  |  4.5        Ca     9.1     [10-14-18 @ 08:17]      Mg     2.5     [10-14-18 @ 08:17]      Phos  5.8     [10-14-18 @ 08:17]    TPro  6.2  /  Alb  3.6  /  TBili  3.8  /  DBili  x   /  AST  359  /  ALT  131  /  AlkPhos  668  [10-13-18 @ 11:17]          Creatinine Trend:  SCr 4.5 [10-14 @ 08:17]  SCr 3.8 [10-13 @ 11:17]  SCr 3.7 [10-12 @ 07:41]  SCr 3.4 [10-11 @ 08:10]  SCr 3.1 [10-10 @ 12:02]    Urinalysis - [10-10-18 @ 12:02]      Color Dark Yellow / Appearance Cloudy / SG 1.020 / pH 6.0      Gluc Negative / Ketone Trace  / Bili Negative / Urobili 1.0       Blood Negative / Protein Trace / Leuk Est Small / Nitrite Negative      RBC 1-2 / WBC 6-10 / Hyaline  / Gran  / Sq Epi  / Non Sq Epi Occasional / Bacteria     Urine Creatinine 20      [10-11-18 @ 10:24]  Urine Sodium 20.0      [10-11-18 @ 10:24]  Urine Urea Nitrogen 481      [10-13-18 @ 00:00]

## 2018-10-16 LAB
% ALBUMIN: 58.8 % — SIGNIFICANT CHANGE UP
% ALPHA 1: 6.3 % — SIGNIFICANT CHANGE UP
% ALPHA 2: 8.7 % — SIGNIFICANT CHANGE UP
% BETA: 8.8 % — SIGNIFICANT CHANGE UP
% GAMMA: 17.4 % — SIGNIFICANT CHANGE UP
ALBUMIN SERPL ELPH-MCNC: 3.5 G/DL — LOW (ref 3.6–5.5)
ALBUMIN SERPL ELPH-MCNC: 3.6 G/DL — SIGNIFICANT CHANGE UP (ref 3.5–5.2)
ALBUMIN/GLOB SERPL ELPH: 1.4 RATIO — SIGNIFICANT CHANGE UP
ALP SERPL-CCNC: 494 U/L — HIGH (ref 30–115)
ALPHA1 GLOB SERPL ELPH-MCNC: 0.4 G/DL — SIGNIFICANT CHANGE UP (ref 0.1–0.4)
ALPHA2 GLOB SERPL ELPH-MCNC: 0.5 G/DL — SIGNIFICANT CHANGE UP (ref 0.5–1)
ALT FLD-CCNC: 167 U/L — HIGH (ref 0–41)
ANION GAP SERPL CALC-SCNC: 30 MMOL/L — HIGH (ref 7–14)
APTT BLD: 42.9 SEC — HIGH (ref 27–39.2)
APTT BLD: 79.7 SEC — CRITICAL HIGH (ref 27–39.2)
AST SERPL-CCNC: 431 U/L — HIGH (ref 0–41)
B-GLOBULIN SERPL ELPH-MCNC: 0.5 G/DL — SIGNIFICANT CHANGE UP (ref 0.5–1)
BASOPHILS # BLD AUTO: 0.02 K/UL — SIGNIFICANT CHANGE UP (ref 0–0.2)
BASOPHILS NFR BLD AUTO: 0.1 % — SIGNIFICANT CHANGE UP (ref 0–1)
BILIRUB SERPL-MCNC: 8.8 MG/DL — HIGH (ref 0.2–1.2)
BUN SERPL-MCNC: 111 MG/DL — CRITICAL HIGH (ref 10–20)
CALCIUM SERPL-MCNC: 8.8 MG/DL — SIGNIFICANT CHANGE UP (ref 8.5–10.1)
CERULOPLASMIN SERPL-MCNC: 26 MG/DL — SIGNIFICANT CHANGE UP (ref 20–60)
CHLORIDE SERPL-SCNC: 95 MMOL/L — LOW (ref 98–110)
CO2 SERPL-SCNC: 14 MMOL/L — LOW (ref 17–32)
CREAT SERPL-MCNC: 5.6 MG/DL — CRITICAL HIGH (ref 0.7–1.5)
EOSINOPHIL # BLD AUTO: 0.02 K/UL — SIGNIFICANT CHANGE UP (ref 0–0.7)
EOSINOPHIL NFR BLD AUTO: 0.1 % — SIGNIFICANT CHANGE UP (ref 0–8)
FERRITIN SERPL-MCNC: 5126 NG/ML — HIGH (ref 30–400)
GAMMA GLOBULIN: 1 G/DL — SIGNIFICANT CHANGE UP (ref 0.6–1.6)
GLUCOSE SERPL-MCNC: 88 MG/DL — SIGNIFICANT CHANGE UP (ref 70–99)
HAV IGM SER-ACNC: SIGNIFICANT CHANGE UP
HBV CORE IGM SER-ACNC: SIGNIFICANT CHANGE UP
HBV SURFACE AG SER-ACNC: SIGNIFICANT CHANGE UP
HCT VFR BLD CALC: 26.7 % — LOW (ref 42–52)
HCV AB S/CO SERPL IA: 0.14 S/CO — SIGNIFICANT CHANGE UP
HCV AB SERPL-IMP: SIGNIFICANT CHANGE UP
HGB BLD-MCNC: 9.1 G/DL — LOW (ref 14–18)
IMM GRANULOCYTES NFR BLD AUTO: 0.7 % — HIGH (ref 0.1–0.3)
INR BLD: 2.31 RATIO — HIGH (ref 0.65–1.3)
INR BLD: 2.49 RATIO — HIGH (ref 0.65–1.3)
LYMPHOCYTES # BLD AUTO: 0.29 K/UL — LOW (ref 1.2–3.4)
LYMPHOCYTES # BLD AUTO: 1.5 % — LOW (ref 20.5–51.1)
MAGNESIUM SERPL-MCNC: 2.4 MG/DL — SIGNIFICANT CHANGE UP (ref 1.8–2.4)
MCHC RBC-ENTMCNC: 25.9 PG — LOW (ref 27–31)
MCHC RBC-ENTMCNC: 34.1 G/DL — SIGNIFICANT CHANGE UP (ref 32–37)
MCV RBC AUTO: 76.1 FL — LOW (ref 80–94)
MITOCHONDRIA AB SER-ACNC: SIGNIFICANT CHANGE UP
MONOCYTES # BLD AUTO: 1.44 K/UL — HIGH (ref 0.1–0.6)
MONOCYTES NFR BLD AUTO: 7.3 % — SIGNIFICANT CHANGE UP (ref 1.7–9.3)
NEUTROPHILS # BLD AUTO: 17.71 K/UL — HIGH (ref 1.4–6.5)
NEUTROPHILS NFR BLD AUTO: 90.3 % — HIGH (ref 42.2–75.2)
NRBC # BLD: 0 /100 WBCS — SIGNIFICANT CHANGE UP (ref 0–0)
PHOSPHATE SERPL-MCNC: 6.6 MG/DL — HIGH (ref 2.1–4.9)
PLATELET # BLD AUTO: 189 K/UL — SIGNIFICANT CHANGE UP (ref 130–400)
POTASSIUM SERPL-MCNC: 5.2 MMOL/L — HIGH (ref 3.5–5)
POTASSIUM SERPL-SCNC: 5.2 MMOL/L — HIGH (ref 3.5–5)
PROT PATTERN SERPL ELPH-IMP: SIGNIFICANT CHANGE UP
PROT SERPL-MCNC: 6 G/DL — SIGNIFICANT CHANGE UP (ref 6–8.3)
PROT SERPL-MCNC: 6 G/DL — SIGNIFICANT CHANGE UP (ref 6–8.3)
PROT SERPL-MCNC: 6.1 G/DL — SIGNIFICANT CHANGE UP (ref 6–8)
PROTHROM AB SERPL-ACNC: 26.4 SEC — HIGH (ref 9.95–12.87)
PROTHROM AB SERPL-ACNC: 28.4 SEC — HIGH (ref 9.95–12.87)
RBC # BLD: 3.51 M/UL — LOW (ref 4.7–6.1)
RBC # FLD: 23.5 % — HIGH (ref 11.5–14.5)
SODIUM SERPL-SCNC: 139 MMOL/L — SIGNIFICANT CHANGE UP (ref 135–146)
WBC # BLD: 19.61 K/UL — HIGH (ref 4.8–10.8)
WBC # FLD AUTO: 19.61 K/UL — HIGH (ref 4.8–10.8)

## 2018-10-16 RX ORDER — TRAMADOL HYDROCHLORIDE 50 MG/1
25 TABLET ORAL EVERY 8 HOURS
Qty: 0 | Refills: 0 | Status: DISCONTINUED | OUTPATIENT
Start: 2018-10-16 | End: 2018-10-18

## 2018-10-16 RX ORDER — SODIUM CHLORIDE 9 MG/ML
1000 INJECTION, SOLUTION INTRAVENOUS
Qty: 0 | Refills: 0 | Status: DISCONTINUED | OUTPATIENT
Start: 2018-10-16 | End: 2018-10-19

## 2018-10-16 RX ORDER — PHYTONADIONE (VIT K1) 5 MG
10 TABLET ORAL ONCE
Qty: 0 | Refills: 0 | Status: DISCONTINUED | OUTPATIENT
Start: 2018-10-16 | End: 2018-10-16

## 2018-10-16 RX ADMIN — Medication 100 MILLIGRAM(S): at 17:40

## 2018-10-16 RX ADMIN — Medication 1300 MILLIGRAM(S): at 22:29

## 2018-10-16 RX ADMIN — OCTREOTIDE ACETATE 100 MICROGRAM(S): 200 INJECTION, SOLUTION INTRAVENOUS; SUBCUTANEOUS at 06:06

## 2018-10-16 RX ADMIN — Medication 100 MILLIGRAM(S): at 14:55

## 2018-10-16 RX ADMIN — Medication 3 MILLIGRAM(S): at 22:29

## 2018-10-16 RX ADMIN — OCTREOTIDE ACETATE 100 MICROGRAM(S): 200 INJECTION, SOLUTION INTRAVENOUS; SUBCUTANEOUS at 17:40

## 2018-10-16 RX ADMIN — Medication 1300 MILLIGRAM(S): at 05:21

## 2018-10-16 RX ADMIN — PANTOPRAZOLE SODIUM 40 MILLIGRAM(S): 20 TABLET, DELAYED RELEASE ORAL at 05:23

## 2018-10-16 RX ADMIN — SEVELAMER CARBONATE 800 MILLIGRAM(S): 2400 POWDER, FOR SUSPENSION ORAL at 14:54

## 2018-10-16 RX ADMIN — CHLORHEXIDINE GLUCONATE 1 APPLICATION(S): 213 SOLUTION TOPICAL at 05:26

## 2018-10-16 RX ADMIN — Medication 81 MILLIGRAM(S): at 11:54

## 2018-10-16 RX ADMIN — ONDANSETRON 4 MILLIGRAM(S): 8 TABLET, FILM COATED ORAL at 05:21

## 2018-10-16 RX ADMIN — Medication 50 MILLIGRAM(S): at 05:21

## 2018-10-16 RX ADMIN — SODIUM CHLORIDE 60 MILLILITER(S): 9 INJECTION, SOLUTION INTRAVENOUS at 15:25

## 2018-10-16 RX ADMIN — Medication 1300 MILLIGRAM(S): at 14:55

## 2018-10-16 RX ADMIN — SEVELAMER CARBONATE 800 MILLIGRAM(S): 2400 POWDER, FOR SUSPENSION ORAL at 22:29

## 2018-10-16 RX ADMIN — TAMSULOSIN HYDROCHLORIDE 0.4 MILLIGRAM(S): 0.4 CAPSULE ORAL at 22:29

## 2018-10-16 RX ADMIN — SEVELAMER CARBONATE 800 MILLIGRAM(S): 2400 POWDER, FOR SUSPENSION ORAL at 05:23

## 2018-10-16 RX ADMIN — FINASTERIDE 5 MILLIGRAM(S): 5 TABLET, FILM COATED ORAL at 11:54

## 2018-10-16 RX ADMIN — Medication 100 MILLIGRAM(S): at 05:17

## 2018-10-16 NOTE — PROGRESS NOTE ADULT - SUBJECTIVE AND OBJECTIVE BOX
ARACELIS RODRIGUEZ  73y  Male      Patient is a 73y old  Male who presents with a chief complaint of Abdominal Distention (16 Oct 2018 11:46)      INTERVAL HPI/OVERNIGHT EVENTS: feels weaker/worse. pain controlled. case d/w grandsons at bedside with patient's permission. no other acute complaints      REVIEW OF SYSTEMS:  as above  All other review of systems negative    T(C): 35.3 (10-16-18 @ 13:01), Max: 37 (10-16-18 @ 06:13)  HR: 85 (10-16-18 @ 13:01) (71 - 85)  BP: 124/70 (10-16-18 @ 13:01) (124/70 - 138/68)  RR: 16 (10-16-18 @ 13:01) (16 - 18)  SpO2: 99% (10-15-18 @ 19:37) (99% - 99%)  Wt(kg): --Vital Signs Last 24 Hrs  T(C): 35.3 (16 Oct 2018 13:01), Max: 37 (16 Oct 2018 06:13)  T(F): 95.6 (16 Oct 2018 13:01), Max: 98.6 (16 Oct 2018 06:13)  HR: 85 (16 Oct 2018 13:01) (71 - 85)  BP: 124/70 (16 Oct 2018 13:01) (124/70 - 138/68)  BP(mean): --  RR: 16 (16 Oct 2018 13:01) (16 - 18)  SpO2: 99% (15 Oct 2018 19:37) (99% - 99%)      10-15-18 @ 07:01  -  10-16-18 @ 07:00  --------------------------------------------------------  IN: 0 mL / OUT: 500 mL / NET: -500 mL        PHYSICAL EXAM:  GENERAL: appearing generally unwell, tired  PSYCH: no agitation, baseline mentation  NERVOUS SYSTEM:  Alert & Oriented X3, no new focal deficits  PULMONARY: poor basilar air entry  CARDIOVASCULAR: Regular rate and rhythm; No murmurs, rubs, or gallops  GI: more tense and distended no rebound/guarding, +BS   curtis scant output, madyson  EXTREMITIES:  2+ Peripheral Pulses, No clubbing, cyanosis, or edema    Consultant(s) Notes Reviewed:  [x ] YES  [ ] NO    Discussed with Consultants/Other Providers [ x] YES     LABS                          9.1    19.61 )-----------( 189      ( 16 Oct 2018 09:46 )             26.7     10-16    139  |  95<L>  |  111<HH>  ----------------------------<  88  5.2<H>   |  14<L>  |  5.6<HH>    Ca    8.8      16 Oct 2018 09:46  Phos  6.6     10-16  Mg     2.4     10-16    TPro  6.1  /  Alb  3.6  /  TBili  8.8<H>  /  DBili  x   /  AST  431<H>  /  ALT  167<H>  /  AlkPhos  494<H>  10-16        PT/INR - ( 16 Oct 2018 09:46 )   PT: 26.40 sec;   INR: 2.31 ratio         PTT - ( 16 Oct 2018 09:46 )  PTT:79.7 sec  Lactate Trend        CAPILLARY BLOOD GLUCOSE            RADIOLOGY & ADDITIONAL TESTS:    Imaging Personally Reviewed:  [ ] YES  [ ] NO    HEALTH ISSUES - PROBLEM Dx:

## 2018-10-16 NOTE — DIETITIAN INITIAL EVALUATION ADULT. - PERTINENT MEDS FT
heparin, NaCl IV fluids, sevelamer hydrochloride, tramadol, sodium bicarbonate, odansetron, octreotide, senna, atorvastatin, docusate, metoprolol, pantoprazole

## 2018-10-16 NOTE — PROGRESS NOTE ADULT - ASSESSMENT
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  # RANULFO ; medical compartment syndrome vs HRS   # ct noted, large ascites s/p paracentesis, no hydro  # UO noted  # change iv fluids to d5 with 3 amp of bicarb at 60 cc /h, if bicarbonate <15  # oncology notes appreciated  # on  renagel 1 tablet q 8  # no acute indication for RRT, however if no improvement , i spoke to the wife and patient/ both wants aggressive measures and agreeable to HD   # will check repeat labs today , if no improvement call surgery for udall placement , check INR , might need FFP   # overall prognosis poor

## 2018-10-16 NOTE — PROGRESS NOTE ADULT - ASSESSMENT
72 yo male with PMH of HTN, BPH, PTSD, DLD, CKD 3, metastatic neuroendocrine tumor of GB (on chemo w/ topotecan) here with worsening abdominal pain secondary to new onset ascites, acute renal failure with metabolic acidosis on CKD3 possibly secondary to hepatorenal syndrome vs abdominal compartment syndrome?, leukocytosis (stable- likely 2/2 to underlying malignancy), now with new B/L posterior tibial DVTs    trend lytes carefully  uptitrating bicarb drip for worsened acidosis  maintain crutis for now, monitor u/o  c/w octreotide, renagel  appreciated renal f/u- Sx for udall placement  s/p IR diagnostic/therapeutic thoracentesis late last week, about 4 liter removed  send for appropriate studies for saag and cytology- pending result- calling lab regarding why not yet resulted  maintain active T+S   minimize hepatotoxic and nephrotoxic meds- dosing adjustments for gfr  GI eval for worsening hyperbilirubinemia appreciated- changed abx to cipro/flagyl rather than ceftriaxone  may end up needing HD  initially on therapeutic AC with heparin ggt for DVT's, however it is being held for upcoming tesio placement  high risk 2/2 comorbidities    advanced directives- FULL code, wants full aggressive medical treatment including life support/ pressors/ vent support/ possible HD  very clear that he wants to be kept alive  d/w grandson

## 2018-10-16 NOTE — DIETITIAN INITIAL EVALUATION ADULT. - OTHER INFO
Per pt grandson pt ~200# about 1 year ago but losing wt since diagnosed with cancer, believes recent wt around 173# - wt documented during admit 83.5 kg and 84.5 kg. This indicates ~16% (8#) wt loss over the past year. Pt does not meet criteria for malnutrition at this time. Reason for assessment: poor po, unable to see pt yesterday as pt was in IR. Pt presents with a chief complaint of Abdominal Distention. Currently admitted to medicine with the primary diagnosis of Malignant ascites. Per pt grandson pt ~200# about 1 year ago but losing wt since diagnosed with cancer, believes recent wt around 173# - wt documented during admit 83.5 kg and 84.5 kg, wt likely higher than actual wt 2/2 ascites. Current documented wts indicate ~16% (8#) wt loss over the past year. Pt does not meet criteria for malnutrition at this time. Reason for assessment: poor po, unable to see pt yesterday as pt was in IR.

## 2018-10-16 NOTE — DIETITIAN INITIAL EVALUATION ADULT. - ENERGY NEEDS
(2175 kcal/day = 30 kcal/kg IBW 2/2 unintended wt loss)   (58 g/day = 0.8 g/kg IBW as pt with CKD III and renal labs elevated)  fluid needs: per LIP

## 2018-10-16 NOTE — PROGRESS NOTE ADULT - SUBJECTIVE AND OBJECTIVE BOX
ARACELIS RODRIGUEZ 73y Male  MRN#: [id number]     SUBJECTIVE  Patient is a 73y old Male who presents with a chief complaint of Abdominal Distention (16 Oct 2018 08:47)  Currently admitted to medicine with the primary diagnosis of Malignant ascites    Today is hospital day 6d, and this morning he is feeling tired, uncomfortable due to abdominal distention. Also admits to mild lower abdominal pain on palpation. Denies nausea/vomiting although he does not have much appetite.     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  BPH (benign prostatic hyperplasia)  PTSD (post-traumatic stress disorder)  Hypertension  Dyslipidemia  CKD (chronic kidney disease), stage III  No significant past surgical history    ALLERGIES:  No Known Allergies    MEDICATIONS:  STANDING MEDICATIONS  aspirin  chewable 81 milliGRAM(s) Oral daily  atorvastatin 10 milliGRAM(s) Oral at bedtime  chlorhexidine 4% Liquid 1 Application(s) Topical <User Schedule>  ciprofloxacin   IVPB 200 milliGRAM(s) IV Intermittent every 24 hours  docusate sodium 100 milliGRAM(s) Oral three times a day  finasteride 5 milliGRAM(s) Oral daily  melatonin 3 milliGRAM(s) Oral at bedtime  metoprolol succinate ER 50 milliGRAM(s) Oral daily  metroNIDAZOLE  IVPB 500 milliGRAM(s) IV Intermittent every 8 hours  octreotide  Injectable 100 MICROGram(s) SubCutaneous two times a day  pantoprazole    Tablet 40 milliGRAM(s) Oral before breakfast  senna 1 Tablet(s) Oral daily  senna 1 Tablet(s) Oral at bedtime  sevelamer hydrochloride 800 milliGRAM(s) Oral every 8 hours  sodium bicarbonate 1300 milliGRAM(s) Oral every 8 hours  sodium chloride 0.45% 1000 milliLiter(s) IV Continuous <Continuous>  tamsulosin 0.4 milliGRAM(s) Oral at bedtime  traZODone 50 milliGRAM(s) Oral daily    PRN MEDICATIONS  ondansetron Injectable 4 milliGRAM(s) IV Push every 6 hours PRN  traMADol 25 milliGRAM(s) Oral every 8 hours PRN      VITAL SIGNS: Last 24 Hours  T(C): 37 (16 Oct 2018 06:13), Max: 37 (16 Oct 2018 06:13)  T(F): 98.6 (16 Oct 2018 06:13), Max: 98.6 (16 Oct 2018 06:13)  HR: 82 (16 Oct 2018 06:13) (71 - 82)  BP: 125/68 (16 Oct 2018 06:13) (125/68 - 138/68)  BP(mean): --  RR: 18 (16 Oct 2018 06:13) (18 - 18)  SpO2: 99% (15 Oct 2018 19:37) (99% - 99%)    LABS:                        9.1    19.61 )-----------( 189      ( 16 Oct 2018 09:46 )             26.7     10-15    139  |  95<L>  |  103<HH>  ----------------------------<  70  5.0   |  14<L>  |  4.4<HH>    Ca    9.0      15 Oct 2018 07:37  Phos  5.8     10-15  Mg     2.5     10-15    TPro  6.0  /  Alb  x   /  TBili  x   /  DBili  x   /  AST  x   /  ALT  x   /  AlkPhos  x   10-15    PT/INR - ( 16 Oct 2018 09:46 )   PT: 26.40 sec;   INR: 2.31 ratio         PTT - ( 16 Oct 2018 09:46 )  PTT:79.7 sec              RADIOLOGY:      PHYSICAL EXAM:  GENERAL: Well-developed, AAOx3. Lethargic compared to yesterday   HEENT: Atraumatic, Normocephalic. EOMI, conjunctiva and sclera clear  PULMONARY: Decreased breath sounds at bases. Clear to auscultation bilaterally, no rales appreciated  CARDIOVASCULAR: Regular rate and rhythm; No murmurs, rubs, or gallops  GASTROINTESTINAL: Significant distention. Mild tenderness to palpation lower abdomen, no rebound, no guarding; Bowel sounds present. (+) fluid wave  MUSCULOSKELETAL:  2+ Peripheral Pulses, No clubbing, 1+ pitting edema bilateral LE. No tenderness to palpation.  NEUROLOGY: non-focal  GENITOURINARY: Minimal urine in Godinez bag

## 2018-10-16 NOTE — DIETITIAN INITIAL EVALUATION ADULT. - FACTORS AFF FOOD INTAKE
pt been nauseous with some vomiting recently (grandson could not report last time pt vomited), last BM per EMR 10/15 pt been nauseous with some vomiting recently (grandson could not report last time pt vomited), last BM per EMR 10/15, no issues chewing/swallowing as pt with top and bottom dentures

## 2018-10-16 NOTE — CONSULT NOTE ADULT - SUBJECTIVE AND OBJECTIVE BOX
Vascular Surgery Consultation Note  =====================================================  HPI:   Patient is a 73 year old male with PMH significant for HTN, BPH, PTSD, DLD, CKD 3, metastatic neuroendocrine tumor of gall bladder on chemotherapy presents with worsening abdominal pain, distension, and bloating x 5 days.  Patient complains of recent constipation, passing BM every other day, was recently started on oxycontin.  Patient reports feeling severe discomfort, denies fever, chills, chest pain, shortness of breath.  Patient was found to have RANULFO on CKD with rising creatinine, baseline creatinine of 1.6 today with creatinine of 5.6 not responding to fluids.  Nephrology spoke to patient and his wife about need for HD, they are agreeable and want all aggressive measures.  Consult for placement of udall.    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  BPH (benign prostatic hyperplasia)  PTSD (post-traumatic stress disorder)  Hypertension  Dyslipidemia  CKD (chronic kidney disease), stage III  No significant past surgical history    Home Meds: Home Medications:  aspirin 81 mg oral tablet, chewable: 1 tab(s) orally once a day (27 Sep 2018 11:46)  Colace 100 mg oral capsule: 1 cap(s) orally 3 times a day (27 Sep 2018 11:46)  Dulcolax Stool Softener 100 mg oral capsule: 1 cap(s) orally 2 times a day (27 Sep 2018 11:46)  finasteride 1 mg oral tablet: 1 tab(s) orally once a day (27 Sep 2018 11:46)  Flomax 0.4 mg oral capsule: 1 cap(s) orally once a day (27 Sep 2018 11:46)  Lipitor 10 mg oral tablet: 1 tab(s) orally once a day (27 Sep 2018 11:46)  MiraLax oral powder for reconstitution: 1 packet(s) orally once a day (27 Sep 2018 11:46)  Nifedical XL: 90 milligram(s) orally once a day (27 Sep 2018 11:46)  oxyCODONE 10 mg oral tablet: 1 tab(s) orally every 6 hours (27 Sep 2018 11:46)  Senna 8.6 mg oral tablet: 1 tab(s) orally once a day (at bedtime) (27 Sep 2018 11:46)  Toprol-XL 50 mg oral tablet, extended release: 1 tab(s) orally once a day (27 Sep 2018 11:46)  traZODone 50 mg oral tablet: 1 tab(s) orally once a day (27 Sep 2018 11:46)    Allergies:    No Known Allergies      Soc:   Advanced Directives: Presumed Full Code     ROS:    REVIEW OF SYSTEMS    [x ] A ten-point review of systems was otherwise negative except as noted.  [ ] Due to altered mental status/intubation, subjective information were not able to be obtained from the patient. History was obtained, to the extent possible, from review of the chart and collateral sources of information.      CURRENT MEDICATIONS:   --------------------------------------------------------------------------------------  Neurologic Medications  melatonin 3 milliGRAM(s) Oral at bedtime  ondansetron Injectable 4 milliGRAM(s) IV Push every 6 hours PRN Nausea and/or Vomiting  traMADol 25 milliGRAM(s) Oral every 8 hours PRN Moderate Pain (4 - 6)  traZODone 50 milliGRAM(s) Oral daily    Cardiovascular Medications  metoprolol succinate ER 50 milliGRAM(s) Oral daily  tamsulosin 0.4 milliGRAM(s) Oral at bedtime    Gastrointestinal Medications  dextrose 5% 1000 milliLiter(s) IV Continuous <Continuous>  docusate sodium 100 milliGRAM(s) Oral three times a day  pantoprazole    Tablet 40 milliGRAM(s) Oral before breakfast  senna 1 Tablet(s) Oral daily  senna 1 Tablet(s) Oral at bedtime  sodium bicarbonate 1300 milliGRAM(s) Oral every 8 hours  sodium chloride 0.45% 1000 milliLiter(s) IV Continuous <Continuous>    Hematologic/Oncologic Medications  aspirin  chewable 81 milliGRAM(s) Oral daily    Antimicrobial/Immunologic Medications  ciprofloxacin   IVPB 200 milliGRAM(s) IV Intermittent every 24 hours  metroNIDAZOLE  IVPB 500 milliGRAM(s) IV Intermittent every 8 hours    Endocrine/Metabolic Medications  atorvastatin 10 milliGRAM(s) Oral at bedtime  finasteride 5 milliGRAM(s) Oral daily  octreotide  Injectable 100 MICROGram(s) SubCutaneous two times a day    Topical/Other Medications  chlorhexidine 4% Liquid 1 Application(s) Topical <User Schedule>  sevelamer hydrochloride 800 milliGRAM(s) Oral every 8 hours    --------------------------------------------------------------------------------------    VITAL SIGNS, INS/OUTS (last 24 hours):  --------------------------------------------------------------------------------------  ICU Vital Signs Last 24 Hrs  T(C): 37 (16 Oct 2018 06:13), Max: 37 (16 Oct 2018 06:13)  T(F): 98.6 (16 Oct 2018 06:13), Max: 98.6 (16 Oct 2018 06:13)  HR: 82 (16 Oct 2018 06:13) (71 - 82)  BP: 125/68 (16 Oct 2018 06:13) (125/68 - 138/68)  RR: 18 (16 Oct 2018 06:13) (18 - 18)  SpO2: 99% (15 Oct 2018 19:37) (99% - 99%)    I&O's Summary    15 Oct 2018 07:01  -  16 Oct 2018 07:00  --------------------------------------------------------  IN: 0 mL / OUT: 500 mL / NET: -500 mL      --------------------------------------------------------------------------------------  PHYSICAL EXAM    General: NAD, AAOx3, calm and cooperative  HEENT: NCAT, ANUJ, EOMI  Cardiac: RRR S1, S2, no Murmurs, rubs or gallops  Respiratory: CTAB  Abdomen: Soft, non-distended, non-tender, +bowel sounds  Musculoskeletal: Strength 5/5 BL UE/LE, ROM intact,    LABS  --------------------------------------------------------------------------------------  Labs:  CAPILLARY BLOOD GLUCOSE                              9.1    19.61 )-----------( 189      ( 16 Oct 2018 09:46 )             26.7       Auto Neutrophil %: 90.3 % (10-16-18 @ 09:46)  Auto Immature Granulocyte %: 0.7 % (10-16-18 @ 09:46)    10-16    139  |  95<L>  |  111<HH>  ----------------------------<  88  5.2<H>   |  14<L>  |  5.6<HH>      Calcium, Total Serum: 8.8 mg/dL (10-16-18 @ 09:46)      LFTs:             6.1  | 8.8  | 431      ------------------[494     ( 16 Oct 2018 09:46 )  3.6  | x    | 167         Lipase:x      Amylase:x           ABG - ( 12 Oct 2018 18:27 )  pH: 7.37  /  pCO2: 29    /  pO2: 76    / HCO3: 17    / Base Excess: -7.5  /  SaO2: 95                Coags:     26.40  ----< 2.31    ( 16 Oct 2018 09:46 )     79.7                  --------------------------------------------------------------------------------------    IMAGING RESULTS  < from: [facility] Duplex Lower Ext Vein Scan, Bilat (10.15.18 @ 10:40) >  Impression:    Posterior tibial vein deep venous thrombosis in bilateral lower   extremities.    < end of copied text >      --------------------------------------------------------------------------------------- Vascular Surgery Consultation Note  =====================================================  HPI:   Patient is a 73 year old male with PMH significant for HTN, BPH, PTSD, DLD, CKD 3, metastatic neuroendocrine tumor of gall bladder on chemotherapy presents with worsening abdominal pain, distension, and bloating x 5 days.  Patient complains of recent constipation, passing BM every other day, was recently started on oxycontin.  Patient reports feeling severe discomfort, denies fever, chills, chest pain, shortness of breath.  Patient was found to have RANULFO on CKD with rising creatinine, baseline creatinine of 1.6 today with creatinine of 5.6 not responding to fluids.  Nephrology spoke to patient and his wife about need for HD, they are agreeable and want all aggressive measures.  Consult for placement of udall.  However after speaking to the patient about the risks and benefits of udall placement he stated that he wanted to think about it and talk it over with his grandson at bedside.     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  BPH (benign prostatic hyperplasia)  PTSD (post-traumatic stress disorder)  Hypertension  Dyslipidemia  CKD (chronic kidney disease), stage III  No significant past surgical history    Home Meds: Home Medications:  aspirin 81 mg oral tablet, chewable: 1 tab(s) orally once a day (27 Sep 2018 11:46)  Colace 100 mg oral capsule: 1 cap(s) orally 3 times a day (27 Sep 2018 11:46)  Dulcolax Stool Softener 100 mg oral capsule: 1 cap(s) orally 2 times a day (27 Sep 2018 11:46)  finasteride 1 mg oral tablet: 1 tab(s) orally once a day (27 Sep 2018 11:46)  Flomax 0.4 mg oral capsule: 1 cap(s) orally once a day (27 Sep 2018 11:46)  Lipitor 10 mg oral tablet: 1 tab(s) orally once a day (27 Sep 2018 11:46)  MiraLax oral powder for reconstitution: 1 packet(s) orally once a day (27 Sep 2018 11:46)  Nifedical XL: 90 milligram(s) orally once a day (27 Sep 2018 11:46)  oxyCODONE 10 mg oral tablet: 1 tab(s) orally every 6 hours (27 Sep 2018 11:46)  Senna 8.6 mg oral tablet: 1 tab(s) orally once a day (at bedtime) (27 Sep 2018 11:46)  Toprol-XL 50 mg oral tablet, extended release: 1 tab(s) orally once a day (27 Sep 2018 11:46)  traZODone 50 mg oral tablet: 1 tab(s) orally once a day (27 Sep 2018 11:46)    Allergies:    No Known Allergies      Soc:   Advanced Directives: Presumed Full Code     ROS:    REVIEW OF SYSTEMS    [x ] A ten-point review of systems was otherwise negative except as noted.  [ ] Due to altered mental status/intubation, subjective information were not able to be obtained from the patient. History was obtained, to the extent possible, from review of the chart and collateral sources of information.      CURRENT MEDICATIONS:   --------------------------------------------------------------------------------------  Neurologic Medications  melatonin 3 milliGRAM(s) Oral at bedtime  ondansetron Injectable 4 milliGRAM(s) IV Push every 6 hours PRN Nausea and/or Vomiting  traMADol 25 milliGRAM(s) Oral every 8 hours PRN Moderate Pain (4 - 6)  traZODone 50 milliGRAM(s) Oral daily    Cardiovascular Medications  metoprolol succinate ER 50 milliGRAM(s) Oral daily  tamsulosin 0.4 milliGRAM(s) Oral at bedtime    Gastrointestinal Medications  dextrose 5% 1000 milliLiter(s) IV Continuous <Continuous>  docusate sodium 100 milliGRAM(s) Oral three times a day  pantoprazole    Tablet 40 milliGRAM(s) Oral before breakfast  senna 1 Tablet(s) Oral daily  senna 1 Tablet(s) Oral at bedtime  sodium bicarbonate 1300 milliGRAM(s) Oral every 8 hours  sodium chloride 0.45% 1000 milliLiter(s) IV Continuous <Continuous>    Hematologic/Oncologic Medications  aspirin  chewable 81 milliGRAM(s) Oral daily    Antimicrobial/Immunologic Medications  ciprofloxacin   IVPB 200 milliGRAM(s) IV Intermittent every 24 hours  metroNIDAZOLE  IVPB 500 milliGRAM(s) IV Intermittent every 8 hours    Endocrine/Metabolic Medications  atorvastatin 10 milliGRAM(s) Oral at bedtime  finasteride 5 milliGRAM(s) Oral daily  octreotide  Injectable 100 MICROGram(s) SubCutaneous two times a day    Topical/Other Medications  chlorhexidine 4% Liquid 1 Application(s) Topical <User Schedule>  sevelamer hydrochloride 800 milliGRAM(s) Oral every 8 hours    --------------------------------------------------------------------------------------    VITAL SIGNS, INS/OUTS (last 24 hours):  --------------------------------------------------------------------------------------  ICU Vital Signs Last 24 Hrs  T(C): 37 (16 Oct 2018 06:13), Max: 37 (16 Oct 2018 06:13)  T(F): 98.6 (16 Oct 2018 06:13), Max: 98.6 (16 Oct 2018 06:13)  HR: 82 (16 Oct 2018 06:13) (71 - 82)  BP: 125/68 (16 Oct 2018 06:13) (125/68 - 138/68)  RR: 18 (16 Oct 2018 06:13) (18 - 18)  SpO2: 99% (15 Oct 2018 19:37) (99% - 99%)    I&O's Summary    15 Oct 2018 07:01  -  16 Oct 2018 07:00  --------------------------------------------------------  IN: 0 mL / OUT: 500 mL / NET: -500 mL      --------------------------------------------------------------------------------------  PHYSICAL EXAM    General: NAD, AAOx3, calm, lethargic  HEENT: NCAT, ANUJ, EOMI  Cardiac: RRR S1, S2, no Murmurs, rubs or gallops  Respiratory: CTAB  Abdomen: Soft, distended, mildly tender in RUQ  Musculoskeletal: Strength 5/5 BL UE/LE, ROM intact, palpable PT and DP bilaterally    LABS  --------------------------------------------------------------------------------------  Labs:  CAPILLARY BLOOD GLUCOSE                              9.1    19.61 )-----------( 189      ( 16 Oct 2018 09:46 )             26.7       Auto Neutrophil %: 90.3 % (10-16-18 @ 09:46)  Auto Immature Granulocyte %: 0.7 % (10-16-18 @ 09:46)    10-16    139  |  95<L>  |  111<HH>  ----------------------------<  88  5.2<H>   |  14<L>  |  5.6<HH>      Calcium, Total Serum: 8.8 mg/dL (10-16-18 @ 09:46)      LFTs:             6.1  | 8.8  | 431      ------------------[494     ( 16 Oct 2018 09:46 )  3.6  | x    | 167         Lipase:x      Amylase:x           ABG - ( 12 Oct 2018 18:27 )  pH: 7.37  /  pCO2: 29    /  pO2: 76    / HCO3: 17    / Base Excess: -7.5  /  SaO2: 95                Coags:     26.40  ----< 2.31    ( 16 Oct 2018 09:46 )     79.7                  --------------------------------------------------------------------------------------    IMAGING RESULTS  < from: [facility] Duplex Lower Ext Vein Scan, Bilat (10.15.18 @ 10:40) >  Impression:    Posterior tibial vein deep venous thrombosis in bilateral lower   extremities.    < end of copied text >      ---------------------------------------------------------------------------------------

## 2018-10-16 NOTE — PROGRESS NOTE ADULT - ASSESSMENT
72 yo male with PMH of HTN, BPH, PTSD, DLD, CKD 3, metastatic neuroendocrine tumor of GB (on chemo w/ topotecan) presents for abdominal bloating and discomfort x 5 days.    1. Abdominal bloating and discomfort with ascites-follow up results of diagnostic/therapeutic paracentesis  2. RANULFO on CKD 3-concern for hepatorenal syndrome, especially in light of problem 1.  Nephrology is following the patient.  Godinez inserted.  Continue octreotide and bicarbonate.  Patient may require hemodialysis in the future.  3. Metastatic neuroendocrine tumor of gallbladder-on palliative chemotherapy with topotecan.  In light of above complications, recommend palliative care evaluation.  4. New B/L posterior tibial DVTs-Patient placed on heparin drip.  Depending on ultimate goals of care, transition to coumadin.    Patient should follow up with Dr. French upon discharge once goals of care established and problems 1, 2, and 4 adequately addressed.

## 2018-10-16 NOTE — PROGRESS NOTE ADULT - SUBJECTIVE AND OBJECTIVE BOX
seen and examined  no distress  lethargic       Standing Inpatient Medications  aspirin  chewable 81 milliGRAM(s) Oral daily  atorvastatin 10 milliGRAM(s) Oral at bedtime  chlorhexidine 4% Liquid 1 Application(s) Topical <User Schedule>  ciprofloxacin   IVPB 200 milliGRAM(s) IV Intermittent every 24 hours  docusate sodium 100 milliGRAM(s) Oral three times a day  finasteride 5 milliGRAM(s) Oral daily  heparin  Infusion 1000 Unit(s)/Hr IV Continuous <Continuous>  melatonin 3 milliGRAM(s) Oral at bedtime  metoprolol succinate ER 50 milliGRAM(s) Oral daily  metroNIDAZOLE  IVPB 500 milliGRAM(s) IV Intermittent every 8 hours  octreotide  Injectable 100 MICROGram(s) SubCutaneous two times a day  pantoprazole    Tablet 40 milliGRAM(s) Oral before breakfast  senna 1 Tablet(s) Oral daily  senna 1 Tablet(s) Oral at bedtime  sevelamer hydrochloride 800 milliGRAM(s) Oral every 8 hours  sodium bicarbonate 1300 milliGRAM(s) Oral every 8 hours  sodium chloride 0.45% 1000 milliLiter(s) IV Continuous <Continuous>  tamsulosin 0.4 milliGRAM(s) Oral at bedtime  traZODone 50 milliGRAM(s) Oral daily          VITALS/PHYSICAL EXAM  --------------------------------------------------------------------------------  T(C): 37 (10-16-18 @ 06:13), Max: 37 (10-16-18 @ 06:13)  HR: 82 (10-16-18 @ 06:13) (71 - 82)  BP: 125/68 (10-16-18 @ 06:13) (125/68 - 138/68)  RR: 18 (10-16-18 @ 06:13) (18 - 18)  SpO2: 99% (10-15-18 @ 19:37) (99% - 99%)  Wt(kg): --        10-15-18 @ 07:01  -  10-16-18 @ 07:00  --------------------------------------------------------  IN: 0 mL / OUT: 500 mL / NET: -500 mL      Physical Exam:  	Gen: NAD  	Pulm:  decrease BS B/L  	CV:  S1S2; no rub  	Abd: +distended  	: kirsten   	LE:  edema    LABS/STUDIES  --------------------------------------------------------------------------------              7.9    25.91 >-----------<  210      [10-15-18 @ 07:37]              23.0     139  |  95  |  103  ----------------------------<  70      [10-15-18 @ 07:37]  5.0   |  14  |  4.4        Ca     9.0     [10-15-18 @ 07:37]      Mg     2.5     [10-15-18 @ 07:37]      Phos  5.8     [10-15-18 @ 07:37]    TPro  6.0  /  Alb  x   /  TBili  x   /  DBili  x   /  AST  x   /  ALT  x   /  AlkPhos  x   [10-15-18 @ 07:37]    PT/INR: PT 24.00, INR 2.10       [10-15-18 @ 18:57]  PTT: 64.3       [10-15-18 @ 18:57]      Creatinine Trend:  SCr 4.4 [10-15 @ 07:37]  SCr 4.5 [10-14 @ 08:17]  SCr 3.8 [10-13 @ 11:17]  SCr 3.7 [10-12 @ 07:41]  SCr 3.4 [10-11 @ 08:10]    Urinalysis - [10-10-18 @ 12:02]      Color Dark Yellow / Appearance Cloudy / SG 1.020 / pH 6.0      Gluc Negative / Ketone Trace  / Bili Negative / Urobili 1.0       Blood Negative / Protein Trace / Leuk Est Small / Nitrite Negative      RBC 1-2 / WBC 6-10 / Hyaline  / Gran  / Sq Epi  / Non Sq Epi Occasional / Bacteria     Urine Creatinine 20      [10-11-18 @ 10:24]  Urine Sodium 20.0      [10-11-18 @ 10:24]  Urine Urea Nitrogen 481      [10-13-18 @ 00:00]      HBsAg Nonreact      [10-15-18 @ 07:37]  HCV 0.14, Nonreact      [10-15-18 @ 07:37]

## 2018-10-16 NOTE — CONSULT NOTE ADULT - ASSESSMENT
ASSESSMENT:  73y Male ***    PLAN:   -PTT 79.7. 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 INR 2.31, plts 189, Hb 9.1  -heparin drip held at 10am, discussed with medicine resident to repeat coags this afternoon for normalization of PTT  -f/u repeat coags this afternoon    -4:16pm: followed up with medicine resident about coagulation kate, she stated that labs were ordered for 4pm, will follow up most recent PTT prior to udall placement    --------------------------------------------------------------------------------------    10-16-18 @ 11:46

## 2018-10-16 NOTE — PROGRESS NOTE ADULT - ASSESSMENT
ADMISSION SUMMARY  74 y/o M with PMH of HTN, BPH, PTSD, DLD, CKD 3 (40), 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     ASSESSMENT & PLAN  # Ascites s/p diagnostic/therapeutic paracentesis (10/12) - recurring  -Possibly malignant, hx of heavy alcohol use, neuroendocrine tumor  -Initial CT abd/pel (10/10)- moderate to large volume ascites. S/p paracentesis by IR on (10/12) with 4L of madyson color fluid successfully drained, 120cc of fluid removed and sent for culture and cytology, but lab unable to receive   -Repeat U/S (10/14) showing moderate ascites - will repeat ultrasound today to see if enough fluid accumulated for IR to drain    # RANULFO on CKD III - worsening with high anion gap metabolic acidosis  -Concern for hepatorenal syndrome vs. medical compartment syndrome given ascites. CT abd/pel (10/10) showed no signs of hydronephrosis  -Cr uptrending since admission 3.1 >>>> 4.5 > 4.4 > 5.6 with bicarb 15 > 14 > 14 today  -High anion gap metabolic acidosis (ABG from 10/12 pH 7.37, HCO3 17); AG: continued at 30 today   -Oliguric unimproved with trial of Lasix 40 mg x1 previously  -Nephro following, appreciate recs - is s/p 4 days of Albumin, continue octreotide 100 mcg BID, add Renagel 1 tab q8h. Patient will need RRT as unimproved with conservative management  -Change fluids to D5W with 150 mEq bicarb @ 60 cc/hr per nephro for unimproved bicarb  -Spoke to surgery for [place] placement, held hep drip, will recheck labs in afternoon and will place [place] after  -Continue to monitor lytes daily    # Acute bilateral posterior tibial DVT  -Hep drip @ 1000 units previous therapeutic PTT  -On hold for [place] placement  -F/u aPTT @ 4pm    # Hypertension - controlled  -Continue metoprolol succinate ER 50 mg PO daily, hold if SBP <100   -Nifedipine d/c'ed earlier given hypotension - continue to hold    # Hyperbilirubinemia, Transaminitis - worsening  -GI consult appreciated - f/u CLD workup results viral hep panel, TEDDY, ASMA, AMA, s. ceruloplasmin, ferritin, % transferrin, SPEP  -Worsening hyperbilirubinemia, f/u GI recs     # Constipation - resolved  -Continue bowel regimen Senna, Colace    # DLD  -Lipitor 10 mg qHS - hold in light of transaminitis ?     # Hx of BPH - stable  -Continue Finasteride 5 mg PO daily, Tamsulosin 0.4 mg PO qHS    # Hx of Insomnia  -Melatonin 3 mg PO qHS  -Trazadone 50 mg PO daily    # DVT Prophylaxis  -Hep drip - will resume after [place] today    # GI Prophylaxis  -Protonix 40 mg PO daily    # Disposition  -Code status: as per discussion with patient today, FULL CODE  -Can reconsult Palliative care for symptom control once medically treatment for causes of dyspnea/abdominal pain

## 2018-10-17 LAB
ANA PAT FLD IF-IMP: ABNORMAL
ANA TITR SER: ABNORMAL
APTT BLD: 57.3 SEC — HIGH (ref 27–39.2)
INR BLD: 3.02 RATIO — HIGH (ref 0.65–1.3)
PROTHROM AB SERPL-ACNC: 34.3 SEC — HIGH (ref 9.95–12.87)

## 2018-10-17 PROCEDURE — 99222 1ST HOSP IP/OBS MODERATE 55: CPT

## 2018-10-17 RX ORDER — MORPHINE SULFATE 50 MG/1
2 CAPSULE, EXTENDED RELEASE ORAL EVERY 6 HOURS
Qty: 0 | Refills: 0 | Status: DISCONTINUED | OUTPATIENT
Start: 2018-10-17 | End: 2018-10-18

## 2018-10-17 RX ADMIN — SEVELAMER CARBONATE 800 MILLIGRAM(S): 2400 POWDER, FOR SUSPENSION ORAL at 21:46

## 2018-10-17 RX ADMIN — Medication 100 MILLIGRAM(S): at 22:49

## 2018-10-17 RX ADMIN — TRAMADOL HYDROCHLORIDE 25 MILLIGRAM(S): 50 TABLET ORAL at 17:33

## 2018-10-17 RX ADMIN — Medication 100 MILLIGRAM(S): at 13:40

## 2018-10-17 RX ADMIN — Medication 3 MILLIGRAM(S): at 21:46

## 2018-10-17 RX ADMIN — CHLORHEXIDINE GLUCONATE 1 APPLICATION(S): 213 SOLUTION TOPICAL at 05:20

## 2018-10-17 RX ADMIN — TRAMADOL HYDROCHLORIDE 25 MILLIGRAM(S): 50 TABLET ORAL at 16:57

## 2018-10-17 RX ADMIN — ONDANSETRON 4 MILLIGRAM(S): 8 TABLET, FILM COATED ORAL at 10:36

## 2018-10-17 RX ADMIN — SENNA PLUS 1 TABLET(S): 8.6 TABLET ORAL at 12:16

## 2018-10-17 RX ADMIN — PANTOPRAZOLE SODIUM 40 MILLIGRAM(S): 20 TABLET, DELAYED RELEASE ORAL at 05:16

## 2018-10-17 RX ADMIN — SODIUM CHLORIDE 60 MILLILITER(S): 9 INJECTION, SOLUTION INTRAVENOUS at 12:16

## 2018-10-17 RX ADMIN — Medication 81 MILLIGRAM(S): at 12:16

## 2018-10-17 RX ADMIN — Medication 100 MILLIGRAM(S): at 05:13

## 2018-10-17 RX ADMIN — Medication 1300 MILLIGRAM(S): at 05:17

## 2018-10-17 RX ADMIN — Medication 50 MILLIGRAM(S): at 05:16

## 2018-10-17 RX ADMIN — OCTREOTIDE ACETATE 100 MICROGRAM(S): 200 INJECTION, SOLUTION INTRAVENOUS; SUBCUTANEOUS at 17:49

## 2018-10-17 RX ADMIN — OCTREOTIDE ACETATE 100 MICROGRAM(S): 200 INJECTION, SOLUTION INTRAVENOUS; SUBCUTANEOUS at 05:18

## 2018-10-17 RX ADMIN — TAMSULOSIN HYDROCHLORIDE 0.4 MILLIGRAM(S): 0.4 CAPSULE ORAL at 21:46

## 2018-10-17 RX ADMIN — SEVELAMER CARBONATE 800 MILLIGRAM(S): 2400 POWDER, FOR SUSPENSION ORAL at 13:39

## 2018-10-17 RX ADMIN — FINASTERIDE 5 MILLIGRAM(S): 5 TABLET, FILM COATED ORAL at 12:16

## 2018-10-17 RX ADMIN — TRAMADOL HYDROCHLORIDE 25 MILLIGRAM(S): 50 TABLET ORAL at 08:37

## 2018-10-17 RX ADMIN — TRAMADOL HYDROCHLORIDE 25 MILLIGRAM(S): 50 TABLET ORAL at 09:07

## 2018-10-17 RX ADMIN — SENNA PLUS 1 TABLET(S): 8.6 TABLET ORAL at 21:46

## 2018-10-17 RX ADMIN — Medication 100 MILLIGRAM(S): at 17:49

## 2018-10-17 RX ADMIN — Medication 1300 MILLIGRAM(S): at 21:46

## 2018-10-17 RX ADMIN — SEVELAMER CARBONATE 800 MILLIGRAM(S): 2400 POWDER, FOR SUSPENSION ORAL at 05:16

## 2018-10-17 RX ADMIN — Medication 100 MILLIGRAM(S): at 00:02

## 2018-10-17 RX ADMIN — Medication 1300 MILLIGRAM(S): at 13:39

## 2018-10-17 NOTE — PROGRESS NOTE ADULT - SUBJECTIVE AND OBJECTIVE BOX
Nephrology progress note    Patient is seen and examined, events over the last 24 h noted.  Pt states worsening discomfort due to abdominal distention.        Allergies:  No Known Allergies    Hospital Medications:   MEDICATIONS  (STANDING):  aspirin  chewable 81 milliGRAM(s) Oral daily  chlorhexidine 4% Liquid 1 Application(s) Topical <User Schedule>  ciprofloxacin   IVPB 200 milliGRAM(s) IV Intermittent every 24 hours  dextrose 5% 1000 milliLiter(s) (60 mL/Hr) IV Continuous <Continuous>  docusate sodium 100 milliGRAM(s) Oral three times a day  finasteride 5 milliGRAM(s) Oral daily  melatonin 3 milliGRAM(s) Oral at bedtime  metoprolol succinate ER 50 milliGRAM(s) Oral daily  metroNIDAZOLE  IVPB 500 milliGRAM(s) IV Intermittent every 8 hours  octreotide  Injectable 100 MICROGram(s) SubCutaneous two times a day  pantoprazole    Tablet 40 milliGRAM(s) Oral before breakfast  senna 1 Tablet(s) Oral daily  sevelamer hydrochloride 800 milliGRAM(s) Oral every 8 hours  sodium bicarbonate 1300 milliGRAM(s) Oral every 8 hours  tamsulosin 0.4 milliGRAM(s) Oral at bedtime  traZODone 50 milliGRAM(s) Oral daily        VITALS:  T(F): 96.8 (10-17-18 @ 06:32), Max: 98.2 (10-16-18 @ 20:29)  HR: 72 (10-17-18 @ 06:32)  BP: 148/67 (10-17-18 @ 06:32)  RR: 18 (10-17-18 @ 06:32)  SpO2: 99% (10-16-18 @ 19:52)      10-15 @ 07:01  -  10-16 @ 07:00  --------------------------------------------------------  IN: 0 mL / OUT: 500 mL / NET: -500 mL    10-16 @ 07:01  -  10-17 @ 07:00  --------------------------------------------------------  IN: 0 mL / OUT: 125 mL / NET: -125 mL          PHYSICAL EXAM:  Constitutional: NAD  HEENT: anicteric sclera, oropharynx clear, dry MM   Neck: No JVD  Respiratory: Limited depths of inspiration due to ascites, no wheezes, rales or rhonchi  Cardiovascular: S1, S2, RRR  Gastrointestinal: Tense distended abdomen   Extremities: No cyanosis or clubbing. No peripheral edema  :  Godinez in place.  Dark urine.     Skin: No rashes    LABS:  10-16    139  |  95<L>  |  111<HH>  ----------------------------<  88  5.2<H>   |  14<L>  |  5.6<HH>    Ca    8.8      16 Oct 2018 09:46  Phos  6.6     10-16  Mg     2.4     10-16    TPro  6.1  /  Alb  3.6  /  TBili  8.8<H>  /  DBili      /  AST  431<H>  /  ALT  167<H>  /  AlkPhos  494<H>  10-16                          9.1    19.61 )-----------( 189      ( 16 Oct 2018 09:46 )             26.7       Urine Studies:  Urinalysis Basic - ( 10 Oct 2018 12:02 )    Color: Dark Yellow / Appearance: Cloudy / S.020 / pH:   Gluc:  / Ketone: Trace  / Bili: Negative / Urobili: 1.0 mg/dL   Blood:  / Protein: Trace mg/dL / Nitrite: Negative   Leuk Esterase: Small / RBC: 1-2 /HPF / WBC 6-10 /HPF   Sq Epi:  / Non Sq Epi: Occasional /HPF / Bacteria:       Sodium, Random Urine: 20.0 mmoL/L (10-11 @ 10:24)  Creatinine, Random Urine: 20 mg/dL (10-11 @ 10:24)    RADIOLOGY & ADDITIONAL STUDIES:    Cytopathology - Non Gyn Report:   Final Diagnosis  ASCITIC FLUID, PARACENTESIS:  - NO MALIGNANT CELLS IDENTIFIED.  - MESOTHELIAL CELLS AND HISTIOCYTES.  - CELL BLOCK PENDING.  (10.12.18 @ 10:00)

## 2018-10-17 NOTE — PROGRESS NOTE ADULT - ASSESSMENT
72 yo M with NE metastatic Ca of the GB with constipation and a mixed pattern (hepatocellular/cholestatic) transaminitis. likely etiology, primarily of tumor burden exacerbated by many other possible etiologies (ischemic hepatopathy, DILI, cholestasis of sepsis, choledocholithiasis, venus thrombi).    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 sepsis (leukocytosis without evident focus)  6)RANULFO- likely ATN, since there is no evidence of CLD , unlikely to be HRS    rec:  1)Please Tap ascites and send fluid for eval: OBTAIN: Albumin, total protein, cell count AT LEAST may include culture AFB stain  Cytology results negative for malignant cells, but it is low yield so please repeat cytology if patient will be tapped again   2)US of the liver with vasculature of the liver (doppler), Please follow liver enzymes and INR daily   3)CLD work up: viral hep pannel, ceruloplasmin,  AMA SPEP negative,  TEDDY positive so please obtain ASMA  Increase ferritin and transferrin sat 55% most likely secondary to malignancy but Please get HFE gene testing   5)Avoid hepatotoxic meds   6) RANULFO could be HRS, patient will start dialysis today, please follow Electrolytes     Will follow 74 yo M with NE metastatic Ca of the GB with constipation and a mixed pattern (hepatocellular/cholestatic) transaminitis. likely etiology, primarily of tumor burden exacerbated by many other possible etiologies (ischemic hepatopathy, DILI, cholestasis of sepsis, choledocholithiasis, venus thrombi).    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 sepsis (leukocytosis without evident focus)  6)RANULFO- likely ATN, since there is no evidence of CLD , unlikely to be HRS    rec:  1)Please Tap ascites and send fluid for eval: OBTAIN: Albumin, total protein, cell count AT LEAST may include culture AFB stain  Cytology results negative for malignant cells, but it is low yield so please repeat cytology if patient will be tapped again   2)Please get US of the liver to check for cbd dilation plus doppler  to check for  liver vasculature, Please follow liver enzymes and INR daily   3)CLD work up: viral hep pannel, ceruloplasmin,  AMA SPEP negative  Increase ferritin and transferrin sat 55% most likely secondary to malignancy will not look for HFE gene given the patient poor prognosis and the low likelihood of any intervention if it is positive   5)Avoid hepatotoxic meds   6) RANULFO could be HRS, patient will start dialysis today, please follow Electrolytes and renal

## 2018-10-17 NOTE — PROGRESS NOTE ADULT - SUBJECTIVE AND OBJECTIVE BOX
ARACELIS RODRIGUEZ 73y Male  MRN#: [id number]     SUBJECTIVE  Patient is a 73y old Male who presents with a chief complaint of Abdominal Distention (17 Oct 2018 09:23)  Currently admitted to medicine with the primary diagnosis of Malignant ascites    Hospital course has been complicated by acute renal failure with anion gap metabolic acidosis    Today is hospital day 7d, and this morning he is lethargic, feeling very tired, still with worsening abdominal discomfort and decreased appetite. Denies nausea/vomiting. Explained plan for dialysis to both patient and wife, they are agreeable with plan.    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  BPH (benign prostatic hyperplasia)  PTSD (post-traumatic stress disorder)  Hypertension  Dyslipidemia  CKD (chronic kidney disease), stage III  No significant past surgical history    ALLERGIES:  No Known Allergies    MEDICATIONS:  STANDING MEDICATIONS  aspirin  chewable 81 milliGRAM(s) Oral daily  chlorhexidine 4% Liquid 1 Application(s) Topical <User Schedule>  ciprofloxacin   IVPB 200 milliGRAM(s) IV Intermittent every 24 hours  dextrose 5% 1000 milliLiter(s) IV Continuous <Continuous>  docusate sodium 100 milliGRAM(s) Oral three times a day  finasteride 5 milliGRAM(s) Oral daily  melatonin 3 milliGRAM(s) Oral at bedtime  metoprolol succinate ER 50 milliGRAM(s) Oral daily  metroNIDAZOLE  IVPB 500 milliGRAM(s) IV Intermittent every 8 hours  octreotide  Injectable 100 MICROGram(s) SubCutaneous two times a day  pantoprazole    Tablet 40 milliGRAM(s) Oral before breakfast  senna 1 Tablet(s) Oral daily  senna 1 Tablet(s) Oral at bedtime  sevelamer hydrochloride 800 milliGRAM(s) Oral every 8 hours  sodium bicarbonate 1300 milliGRAM(s) Oral every 8 hours  tamsulosin 0.4 milliGRAM(s) Oral at bedtime  traZODone 50 milliGRAM(s) Oral daily    PRN MEDICATIONS  ondansetron Injectable 4 milliGRAM(s) IV Push every 6 hours PRN  traMADol 25 milliGRAM(s) Oral every 8 hours PRN      VITAL SIGNS: Last 24 Hours  T(C): 36 (17 Oct 2018 06:32), Max: 36.8 (16 Oct 2018 20:29)  T(F): 96.8 (17 Oct 2018 06:32), Max: 98.2 (16 Oct 2018 20:29)  HR: 72 (17 Oct 2018 06:32) (72 - 85)  BP: 148/67 (17 Oct 2018 06:32) (124/70 - 148/67)  BP(mean): --  RR: 18 (17 Oct 2018 06:32) (16 - 18)  SpO2: 99% (16 Oct 2018 19:52) (99% - 99%)    LABS:                        9.1    19.61 )-----------( 189      ( 16 Oct 2018 09:46 )             26.7     10-16    139  |  95<L>  |  111<HH>  ----------------------------<  88  5.2<H>   |  14<L>  |  5.6<HH>    Ca    8.8      16 Oct 2018 09:46  Phos  6.6     10-16  Mg     2.4     10-16    TPro  6.1  /  Alb  3.6  /  TBili  8.8<H>  /  DBili  x   /  AST  431<H>  /  ALT  167<H>  /  AlkPhos  494<H>  10-16    PT/INR - ( 16 Oct 2018 18:26 )   PT: 28.40 sec;   INR: 2.49 ratio         PTT - ( 16 Oct 2018 18:26 )  PTT:42.9 sec              RADIOLOGY:  < from: US Abdomen Limited (10.16.18 @ 11:57) >  IMPRESSION:    Moderate abdominal ascites, similar in extent compared with CT abdomen   and pelvis 10/10/2018.    Hepatic masses consistent with known metastases.    < end of copied text >      PHYSICAL EXAM:  GENERAL: Well-developed, AAOx3. Lethargic compared to yesterday   HEENT: Atraumatic, Normocephalic. EOMI, conjunctiva and sclera clear  PULMONARY: Decreased breath sounds at bases. Clear to auscultation bilaterally, no rales appreciated  CARDIOVASCULAR: Regular rate and rhythm; No murmurs, rubs, or gallops  GASTROINTESTINAL: Significant distention. Mild tenderness to palpation lower abdomen, no rebound, no guarding; Bowel sounds present. (+) fluid wave  MUSCULOSKELETAL:  2+ Peripheral Pulses, No clubbing, 1+ pitting edema bilateral LE. No tenderness to palpation.  NEUROLOGY: non-focal  GENITOURINARY: Minimal urine in Godinez bag

## 2018-10-17 NOTE — PROGRESS NOTE ADULT - SUBJECTIVE AND OBJECTIVE BOX
We are following the patient for increase liver enzymes     GI HPI Today:  Patient feels ok today. No abdominal pain, no vomiting, no melena or hematochezia. His abdomen still distended. No SOB    PAST MEDICAL & SURGICAL HISTORY  Neuroendocrine carcinoma metastatic to liver  BPH (benign prostatic hyperplasia)  PTSD (post-traumatic stress disorder)  Hypertension  Dyslipidemia  CKD (chronic kidney disease), stage III  No significant past surgical history      ALLERGIES:  No Known Allergies      MEDICATIONS:  MEDICATIONS  (STANDING):  aspirin  chewable 81 milliGRAM(s) Oral daily  chlorhexidine 4% Liquid 1 Application(s) Topical <User Schedule>  ciprofloxacin   IVPB 200 milliGRAM(s) IV Intermittent every 24 hours  dextrose 5% 1000 milliLiter(s) (60 mL/Hr) IV Continuous <Continuous>  docusate sodium 100 milliGRAM(s) Oral three times a day  finasteride 5 milliGRAM(s) Oral daily  melatonin 3 milliGRAM(s) Oral at bedtime  metoprolol succinate ER 50 milliGRAM(s) Oral daily  metroNIDAZOLE  IVPB 500 milliGRAM(s) IV Intermittent every 8 hours  octreotide  Injectable 100 MICROGram(s) SubCutaneous two times a day  pantoprazole    Tablet 40 milliGRAM(s) Oral before breakfast  senna 1 Tablet(s) Oral daily  senna 1 Tablet(s) Oral at bedtime  sevelamer hydrochloride 800 milliGRAM(s) Oral every 8 hours  sodium bicarbonate 1300 milliGRAM(s) Oral every 8 hours  tamsulosin 0.4 milliGRAM(s) Oral at bedtime  traZODone 50 milliGRAM(s) Oral daily    MEDICATIONS  (PRN):  ondansetron Injectable 4 milliGRAM(s) IV Push every 6 hours PRN Nausea and/or Vomiting  traMADol 25 milliGRAM(s) Oral every 8 hours PRN Moderate Pain (4 - 6)      REVIEW OF SYSTEMS  General:  + weight loss, no fevers, or chills.  Eyes:  No reported pain or visual changes  ENT:  No sore throat or runny nose.  NECK: No stiffness or lymphadenopathy  CV:  No chest pain or palpitations.  Resp:  No shortness of breath, cough, wheezing or hemoptysis  GI:  See HPI  :  No dysuria, urinary incontinence or hematuria.  Muscle:  No aches or weakness  Neuro:  No tingling, numbness or vertigo  Psych:  No mood problems or irritability.  Endocrine:  No polyuria, polydipsia or cold/heat intolerance  Heme:  No ecchymosis or easy bruisability  All other review of systems is negative unless indicated above.       VITALS:   T(F): 96.8 (10-17 @ 06:32), Max: 98.6 (10-16 @ 06:13)  HR: 72 (10-17 @ 06:32) (67 - 90)  BP: 148/67 (10-17 @ 06:32) (108/52 - 148/67)  BP(mean): --  RR: 18 (10-17 @ 06:32) (16 - 81)  SpO2: 99% (10-16 @ 19:52) (99% - 100%)        PHYSICAL EXAM:  GENERAL:  Appears in no distress  CHEST:  Full & symmetric excursion, no increased effort, breath sounds clear  HEART:  Regular rhythm, S1, S2,   ABDOMEN:  Soft, non-tender, severely distended, unable to hear bowel sounds,  no masses   EXTEREMITIES:  no edema  SKIN:  No rash  NEURO:  Alert, oriented, no tremor         Blood Work :                        9.1    19.61 )-----------( 189      ( 16 Oct 2018 09:46 )             26.7     PT/INR - ( 16 Oct 2018 18:26 )  INR: 2.49          PTT - ( 16 Oct 2018 18:26 )  PTT:42.9<H>  10-16    139  |  95<L>  |  111<HH>  ----------------------------<  88  5.2<H>   |  14<L>  |  5.6<HH>    Ca    8.8      16 Oct 2018 09:46  Phos  6.6     10-16  Mg     2.4     10-16      CBC -  ( 16 Oct 2018 09:46 )  Hemoglobin : 9.1    CBC -  ( 15 Oct 2018 07:37 )  Hemoglobin : 7.9    CBC -  ( 14 Oct 2018 08:17 )  Hemoglobin : 8.4    CBC -  ( 13 Oct 2018 11:17 )  Hemoglobin : 8.2      LIVER FUNCTIONS - ( 16 Oct 2018 09:46 )  Alb: 3.6 [3.5 - 5.2] / Pro: 6.1 [6.0 - 8.0] / ALK PHOS: 494 [30 - 115] / ALT: 167 [0 - 41] / AST: 431 [0 - 41] / GGT: x     LIVER FUNCTIONS - ( 15 Oct 2018 07:37 )  Alb: 3.5 [3.6 - 5.5] / Pro: 6.0 [6.0 - 8.3] / ALK PHOS: x / ALT: x / AST: x / GGT: x     LIVER FUNCTIONS - ( 13 Oct 2018 11:17 )  Alb: 3.6 [3.5 - 5.2] / Pro: 6.2 [6.0 - 8.0] / ALK PHOS: 668 [30 - 115] / ALT: 131 [0 - 41] / AST: 359 [0 - 41] / GGT: x     LIVER FUNCTIONS - ( 11 Oct 2018 08:10 )  Alb: 2.7 [3.5 - 5.2] / Pro: 6.1 [6.0 - 8.0] / ALK PHOS: 938 [30 - 115] / ALT: 140 [0 - 41] / AST: 404 [0 - 41] / GGT: x     LIVER FUNCTIONS - ( 10 Oct 2018 12:02 )  Alb: 2.8 [3.5 - 5.2] / Pro: 6.7 [6.0 - 8.0] / ALK PHOS: 1023 [30 - 115] / ALT: 140 [0 - 41] / AST: 415 [0 - 41] / GGT: x         RADIOLOGY:  < from: CT Abdomen and Pelvis No Cont (10.10.18 @ 17:46) >          INTERPRETATION:  CLINICAL STATEMENT: Abdominal pain. History of bladder   cancer.      TECHNIQUE: Contiguous axial CT images were obtained from the lower chest   to the pubic symphysis without intravenous contrast.  Oral contrast was   not administered.  Reformatted images in the coronal and sagittal planes   were acquired.    COMPARISON CT: September 27, 2018.      FINDINGS:    LOWER CHEST: Mild bibasilar atelectasis.     HEPATOBILIARY: Numerous overall unchanged ill-defined bilobar hepatic   metastases, poorly evaluated without intravenous contrast.    SPLEEN: Unremarkable.    PANCREAS: Unremarkable.    ADRENAL GLANDS: Stable nodular thickening of the left adrenal gland.    KIDNEYS: Stable bilateral renal cysts.    ABDOMINOPELVIC NODES: Overall unchanged extensive retroperitoneal   adenopathy, measuring up to 6.8 x 3.5 cm at the level of the renal   arteries.    PELVIC ORGANS: Unremarkable.    PERITONEUM/MESENTERY/BOWEL: Increased moderate to large volume   abdominopelvic ascites. No evidence of bowel obstruction. No free   intraperitoneal air.    BONES/SOFT TISSUES: Degenerative changes of the spine. No suspicious   osseous lesion.    OTHER: Atherosclerotic vascular calcification.      IMPRESSION:     1. Since September 27, 2018, increased moderate to large volume   abdominopelvic ascites.    2. Numerous overall unchanged ill-defined bilobar hepatic metastases.    3. Overall unchanged extensive retroperitoneal adenopathy.      < end of copied text >  Cytopathology - Non Gyn Report (10.12.18 @ 10:00)    Cytopathology - Non Gyn Report:   ACCESSION No:  [id number]    MICHAEL ARACELIS                           1        Cytopathology Report            Specimen(s) Submitted  1     ASCITIC FLUID      Clinical History  Ascites      Gross Description  The specimen is received fresh labeled with the patient's name  and consists of 130 ml of yellow fluid. One monolayer slide (Thin  Prep) is prepared and stained with Papanicolaou stain. One cell  block is made.      Final Diagnosis  ASCITIC FLUID, PARACENTESIS:  - NO MALIGNANT CELLS IDENTIFIED.  - MESOTHELIAL CELLS AND HISTIOCYTES.  - CELL BLOCK PENDING.    Screened by: Ishmael Valenzuela M.D.  Verified by: Ishmael Valenzuela M.D.  (Electronic Signature)  Reported on: 10/15/18 16:21 EDT, [address] [place] [address]  Cytology technical processing performed at [facility],  [address] Floor, [place] [address]  _________________________________________________________________    < from: US Abdomen Limited (10.16.18 @ 11:57) >  EXAM:  US ABDOMEN LIMITED            PROCEDURE DATE:  10/16/2018            INTERPRETATION:  CLINICAL HISTORY: Ascites..    COMPARISON: CT abdomen and pelvis 10/10/2018    PROCEDURE: Abdominal ultrasound.    FINDINGS/  IMPRESSION:    Moderate abdominal ascites, similar in extent compared with CT abdomen   and pelvis 10/10/2018.    Hepatic masses consistent with known metastases.          < end of copied text >

## 2018-10-17 NOTE — PROGRESS NOTE ADULT - ASSESSMENT
72 yo male with PMH of HTN, BPH, PTSD, DLD, CKD 3, metastatic neuroendocrine tumor of GB (on chemo w/ topotecan) presents for abdominal bloating and discomfort x 5 days.    1. Abdominal bloating and discomfort with ascites-follow up results of diagnostic/therapeutic paracentesis.  Patient may go for another paracentesis by IR today.  2. RANULFO on CKD 3-concern for hepatorenal syndrome, especially in light of problem 1.  Nephrology is following the patient.  Godinez inserted.  Continue octreotide and bicarbonate.  Discussed with intern.  Vascular surgery will insert Udoll today, and patient will likely go for hemodialysis.  3. Metastatic neuroendocrine tumor of gallbladder-on palliative chemotherapy with topotecan.  In light of above complications, recommend palliative care evaluation.  4. New B/L posterior tibial DVTs-Patient placed on heparin drip.  Depending on ultimate goals of care, transition to coumadin.    As per Dr. French's documented discussion with patient's grandson and wife, they had refused hemodialysis and wanted palliative care evaluation.  However, hospitalist and intern also spoke with patient and wife, and they were adamant that they wanted aggressive medical intervention.  This morning, patient was agreeable to hemodialysis.  Team will follow up again with wife when she arrives. 74 yo male with PMH of HTN, BPH, PTSD, DLD, CKD 3, metastatic neuroendocrine tumor of GB (on chemo w/ topotecan) presents for abdominal bloating and discomfort x 5 days.    1. Abdominal bloating and discomfort with ascites-follow up results of diagnostic/therapeutic paracentesis.  Patient may go for another paracentesis by IR today.  2. RANULFO on CKD 3-concern for hepatorenal syndrome, especially in light of problem 1.  Nephrology is following the patient.  Godinez inserted.  Continue octreotide and bicarbonate.      3. Metastatic neuroendocrine tumor of gallbladder-on palliative chemotherapy with topotecan.  In light of above complications, recommend palliative care evaluation.    4. New B/L posterior tibial DVTs-Patient placed on heparin drip.  Depending on ultimate goals of care, transition to coumadin.    As per Dr. French's documented discussion with patient's grandson and wife, they had refused hemodialysis and wanted palliative care evaluation.  However, hospitalist and intern also spoke with patient and wife, and they were adamant that they wanted aggressive medical intervention.  This morning, patient was agreeable to hemodialysis.  Team will follow up again with wife when she arrives.

## 2018-10-17 NOTE — PROGRESS NOTE ADULT - ASSESSMENT
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  # RANULFO ; medical compartment syndrome vs HRS   # ct noted, large ascites s/p paracentesis, no hydro  # UO noted  # oligoanuric despite IVF / octreotide and midodrine   # oncology notes appreciated  # on  renagel 1 tablet q 8  # will initiate RRT today , BMP CBC  still pending from today   # discussed with family at bedside agreeable to plan   # overall prognosis poor

## 2018-10-17 NOTE — PROGRESS NOTE ADULT - SUBJECTIVE AND OBJECTIVE BOX
Patient is a 73y old  Male who presents with a chief complaint of Abdominal Distention (17 Oct 2018 07:37)    Subjective: Patient seen and examined.  No overnight events.      Vital Signs Last 24 Hrs  T(C): 36 (17 Oct 2018 06:32), Max: 36.8 (16 Oct 2018 20:29)  T(F): 96.8 (17 Oct 2018 06:32), Max: 98.2 (16 Oct 2018 20:29)  HR: 72 (17 Oct 2018 06:32) (72 - 85)  BP: 148/67 (17 Oct 2018 06:32) (124/70 - 148/67)  BP(mean): --  RR: 18 (17 Oct 2018 06:32) (16 - 18)  SpO2: 99% (16 Oct 2018 19:52) (99% - 99%)    PHYSICAL EXAM  General: adult in NAD, sleeping comfortably, easily awakened  HEENT: NC/AT, +scleral icterus  Neck: supple  Lungs: positive air movement b/l ant lungs  Abdomen: distended but soft, no rebound, no guarding  Ext: + curtis catheter with minimal urine output  Skin: no rashes and no petechiae  Neuro: appears very tired, minimally responsive to questions    MEDICATIONS  (STANDING):  aspirin  chewable 81 milliGRAM(s) Oral daily  chlorhexidine 4% Liquid 1 Application(s) Topical <User Schedule>  ciprofloxacin   IVPB 200 milliGRAM(s) IV Intermittent every 24 hours  dextrose 5% 1000 milliLiter(s) (60 mL/Hr) IV Continuous <Continuous>  docusate sodium 100 milliGRAM(s) Oral three times a day  finasteride 5 milliGRAM(s) Oral daily  melatonin 3 milliGRAM(s) Oral at bedtime  metoprolol succinate ER 50 milliGRAM(s) Oral daily  metroNIDAZOLE  IVPB 500 milliGRAM(s) IV Intermittent every 8 hours  octreotide  Injectable 100 MICROGram(s) SubCutaneous two times a day  pantoprazole    Tablet 40 milliGRAM(s) Oral before breakfast  senna 1 Tablet(s) Oral daily  senna 1 Tablet(s) Oral at bedtime  sevelamer hydrochloride 800 milliGRAM(s) Oral every 8 hours  sodium bicarbonate 1300 milliGRAM(s) Oral every 8 hours  tamsulosin 0.4 milliGRAM(s) Oral at bedtime  traZODone 50 milliGRAM(s) Oral daily    MEDICATIONS  (PRN):  ondansetron Injectable 4 milliGRAM(s) IV Push every 6 hours PRN Nausea and/or Vomiting  traMADol 25 milliGRAM(s) Oral every 8 hours PRN Moderate Pain (4 - 6)      LABS:                          9.1    19.61 )-----------( 189      ( 16 Oct 2018 09:46 )             26.7         Mean Cell Volume : 76.1 fL  Mean Cell Hemoglobin : 25.9 pg  Mean Cell Hemoglobin Concentration : 34.1 g/dL  Auto Neutrophil # : 17.71 K/uL  Auto Lymphocyte # : 0.29 K/uL  Auto Monocyte # : 1.44 K/uL  Auto Eosinophil # : 0.02 K/uL  Auto Basophil # : 0.02 K/uL  Auto Neutrophil % : 90.3 %  Auto Lymphocyte % : 1.5 %  Auto Monocyte % : 7.3 %  Auto Eosinophil % : 0.1 %  Auto Basophil % : 0.1 %      Serial CBC's  10-16 @ 09:46  Hct-26.7 / Hgb-9.1 / Plat-189 / RBC-3.51 / WBC-19.61  Serial CBC's  10-15 @ 07:37  Hct-23.0 / Hgb-7.9 / Plat-210 / RBC-3.04 / WBC-25.91  Serial CBC's  10-14 @ 08:17  Hct-25.2 / Hgb-8.4 / Plat-267 / RBC-3.28 / WBC-24.58  Serial CBC's  10-13 @ 11:17  Hct-24.0 / Hgb-8.2 / Plat-261 / RBC-3.16 / WBC-22.83      10-16    139  |  95<L>  |  111<HH>  ----------------------------<  88  5.2<H>   |  14<L>  |  5.6<HH>    Ca    8.8      16 Oct 2018 09:46  Phos  6.6     10-16  Mg     2.4     10-16    TPro  6.1  /  Alb  3.6  /  TBili  8.8<H>  /  DBili  x   /  AST  431<H>  /  ALT  167<H>  /  AlkPhos  494<H>  10-16      PT/INR - ( 16 Oct 2018 18:26 )   PT: 28.40 sec;   INR: 2.49 ratio         PTT - ( 16 Oct 2018 18:26 )  PTT:42.9 sec    Ferritin, Serum: 5126 ng/mL (10-15 @ 07:37)      Serum Protein Electrophoresis Interp: Normal Electrophoresis Pattern (10-15 @ 07:37)                      BLOOD SMEAR INTERPRETATION:       RADIOLOGY & ADDITIONAL STUDIES:

## 2018-10-17 NOTE — PROCEDURE NOTE - PROCEDURE
<<-----Click on this checkbox to enter Procedure Dialysis catheter inserted in groin  10/17/2018    Active  MIANE

## 2018-10-17 NOTE — PROGRESS NOTE ADULT - ASSESSMENT
73 year old  male with PMHx of CKD 3 (Baseline Cr 1.6), metastatic neuroendocrine tumor of the gall bladder (on chemotherapy with topotecan), severe retroperitoneal lymphadenopathy and ascites who presented with RANULFO not responsive to IVF.       # RANULFO ; medical compartment syndrome vs HRS   - Large ascites s/p paracentesis 10/12/18.  - Plan for repeat IR paracentesis today  - On D5W with 150 meq sodium bicarbonate at 60cc/h and sodium bicarbonate 1300mg q8h   - On renagel 800 mg q8h  - On octreotide 100mcg SQ bid  - Urine output of 125mL    - No improvement in Cr, K+, or acidosis.    - Plan to start HD with [facility] placement planned for today   - Nephrology will follow

## 2018-10-17 NOTE — PROGRESS NOTE ADULT - ASSESSMENT
ADMISSION SUMMARY  72 y/o M with PMH of HTN, BPH, PTSD, DLD, CKD 3 (40), 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     ASSESSMENT & PLAN  # Ascites s/p diagnostic/therapeutic paracentesis (10/12) - worsening  -Possibly malignant, hx of heavy alcohol use, neuroendocrine tumor with mets to liver  -Initial CT abd/pel (10/10)- moderate to large volume ascites. S/p paracentesis by IR on (10/12) with 4L of madyson color fluid successfully drained, 120cc of fluid removed and sent for culture and cytology, but lab unable to receive   -Repeat U/S (10/16) with findings as above spoke with IR, will do paracentesis today/tomorrow    # RANULFO on CKD III - worsening with high anion gap metabolic acidosis  -Concern for hepatorenal syndrome vs. medical compartment syndrome given ascites. CT abd/pel (10/10) showed no signs of hydronephrosis  -Cr uptrending since admission 3.1 >>> 4.4 > 5.6 with bicarb 14 yesterday - f/u repeat today  -High anion gap metabolic acidosis (ABG from 10/12 pH 7.37, HCO3 17); A yesterday   -Oliguric unimproved with trial of Lasix 40 mg x1 previously  -Nephro following, appreciate recs - is s/p 4 days of Albumin, continue octreotide 100 mcg BID, add Renagel 1 tab q8h. Patient will need RRT as unimproved with conservative management  -Continue D5W with 150 mEq bicarb @ 60 cc/hr for bicarb   -Surgery will place [place] today, hep drip on hold - f/u nephro for dialysis plan  -Continue to monitor lytes closely    # Acute bilateral posterior tibial DVT  -Hep drip @ 1000 units previous therapeutic PTT  -On hold for [place] placement for now, will resume if paracentesis delayed    # Hypertension - controlled  -Continue metoprolol succinate ER 50 mg PO daily, hold if SBP <100   -Nifedipine d/c'ed earlier given hypotension - continue to hold    # Hyperbilirubinemia, Transaminitis - worsening  -GI following - will resend paracentesis fluid for analysis following tap, will add liver disease workup requested by GI   -TEDDY positive and iron studies abnormal    # Constipation - resolved  -Continue bowel regimen Senna, Colace    # DLD  -Hold Lipitor 10 mg qHS for transaminitis    # Hx of BPH - stable  -Continue Finasteride 5 mg PO daily, Tamsulosin 0.4 mg PO qHS    # Hx of Insomnia  -Melatonin 3 mg PO qHS  -Trazadone 50 mg PO daily    # DVT Prophylaxis  -Hep drip - will resume after [place] today if paracentesis delayed    # GI Prophylaxis  -Protonix 40 mg PO daily    # Disposition  -Code status: as per discussion with patient and wife, FULL CODE  -Can reconsult Palliative care for symptom control once medically treatment for causes of dyspnea/abdominal pain

## 2018-10-17 NOTE — PROGRESS NOTE ADULT - SUBJECTIVE AND OBJECTIVE BOX
Nephrology progress note    Patient is seen and examined, events over the last 24 h noted .    Allergies:  No Known Allergies    Hospital Medications:   MEDICATIONS  (STANDING):  aspirin  chewable 81 milliGRAM(s) Oral daily  chlorhexidine 4% Liquid 1 Application(s) Topical <User Schedule>  ciprofloxacin   IVPB 200 milliGRAM(s) IV Intermittent every 24 hours  dextrose 5% 1000 milliLiter(s) (60 mL/Hr) IV Continuous <Continuous>  docusate sodium 100 milliGRAM(s) Oral three times a day  finasteride 5 milliGRAM(s) Oral daily  melatonin 3 milliGRAM(s) Oral at bedtime  metoprolol succinate ER 50 milliGRAM(s) Oral daily  metroNIDAZOLE  IVPB 500 milliGRAM(s) IV Intermittent every 8 hours  octreotide  Injectable 100 MICROGram(s) SubCutaneous two times a day  pantoprazole    Tablet 40 milliGRAM(s) Oral before breakfast  senna 1 Tablet(s) Oral daily  senna 1 Tablet(s) Oral at bedtime  sevelamer hydrochloride 800 milliGRAM(s) Oral every 8 hours  sodium bicarbonate 1300 milliGRAM(s) Oral every 8 hours  tamsulosin 0.4 milliGRAM(s) Oral at bedtime  traZODone 50 milliGRAM(s) Oral daily        VITALS:  T(F): 96.8 (10-17-18 @ 06:32), Max: 98.2 (10-16-18 @ 20:29)  HR: 72 (10-17-18 @ 06:32)  BP: 148/67 (10-17-18 @ 06:32)  RR: 18 (10-17-18 @ 06:32)  SpO2: 99% (10-16-18 @ 19:52)  Wt(kg): --    10-15 @ 07:01  -  10-16 @ 07:00  --------------------------------------------------------  IN: 0 mL / OUT: 500 mL / NET: -500 mL    10-16 @ 07:01  -  10-17 @ 07:00  --------------------------------------------------------  IN: 0 mL / OUT: 125 mL / NET: -125 mL          PHYSICAL EXAM:  Constitutional: NAD  HEENT: anicteric sclera, oropharynx clear, MMM  Neck: No JVD  Respiratory: CTAB, no wheezes, rales or rhonchi  Cardiovascular: S1, S2, RRR  Gastrointestinal: BS+, soft, NT/ND  Extremities: No cyanosis or clubbing. No peripheral edema  :  No curtis.   Skin: No rashes    LABS:  10-16    139  |  95<L>  |  111<HH>  ----------------------------<  88  5.2<H>   |  14<L>  |  5.6<HH>  Creatinine Trend: 5.6<--, 4.4<--, 4.5<--, 3.8<--, 3.7<--, 3.4<--  Ca    8.8      16 Oct 2018 09:46  Phos  6.6     10-16  Mg     2.4     10-16    TPro  6.1  /  Alb  3.6  /  TBili  8.8<H>  /  DBili      /  AST  431<H>  /  ALT  167<H>  /  AlkPhos  494<H>  10-16                          9.1    19.61 )-----------( 189      ( 16 Oct 2018 09:46 )             26.7       Urine Studies:    Sodium, Random Urine: 20.0 mmoL/L (10-11 @ 10:24)  Creatinine, Random Urine: 20 mg/dL (10-11 @ 10:24)    RADIOLOGY & ADDITIONAL STUDIES: Nephrology progress note    Patient is seen and examined, events over the last 24 h noted .  Patient is SOB   oligoanuric   Creat worse today     Allergies:  No Known Allergies    Hospital Medications:   MEDICATIONS  (STANDING):  aspirin  chewable 81 milliGRAM(s) Oral daily  chlorhexidine 4% Liquid 1 Application(s) Topical <User Schedule>  ciprofloxacin   IVPB 200 milliGRAM(s) IV Intermittent every 24 hours  dextrose 5% 1000 milliLiter(s) (60 mL/Hr) IV Continuous <Continuous>  docusate sodium 100 milliGRAM(s) Oral three times a day  finasteride 5 milliGRAM(s) Oral daily  melatonin 3 milliGRAM(s) Oral at bedtime  metoprolol succinate ER 50 milliGRAM(s) Oral daily  metroNIDAZOLE  IVPB 500 milliGRAM(s) IV Intermittent every 8 hours  octreotide  Injectable 100 MICROGram(s) SubCutaneous two times a day  pantoprazole    Tablet 40 milliGRAM(s) Oral before breakfast  sevelamer hydrochloride 800 milliGRAM(s) Oral every 8 hours  sodium bicarbonate 1300 milliGRAM(s) Oral every 8 hours  tamsulosin 0.4 milliGRAM(s) Oral at bedtime  traZODone 50 milliGRAM(s) Oral daily        VITALS:  T(F): 96.8 (10-17-18 @ 06:32), Max: 98.2 (10-16-18 @ 20:29)  HR: 72 (10-17-18 @ 06:32)  BP: 148/67 (10-17-18 @ 06:32)  RR: 18 (10-17-18 @ 06:32)  SpO2: 99% (10-16-18 @ 19:52)  Wt(kg): --    10-15 @ 07:01  -  10-16 @ 07:00  --------------------------------------------------------  IN: 0 mL / OUT: 500 mL / NET: -500 mL    10-16 @ 07:01  -  10-17 @ 07:00  --------------------------------------------------------  IN: 0 mL / OUT: 125 mL / NET: -125 mL          PHYSICAL EXAM:  Constitutional: lethargic weak   HEENT: anicteric sclera, oropharynx clear, MMM  Neck: No JVD  Respiratory: Crackles at base   Cardiovascular: S1, S2, RRR  Gastrointestinal: BS+, soft, NT/ND  Extremities: No cyanosis or clubbing. plus one peripheral edema   Skin: No rashes    LABS:  10-16    139  |  95<L>  |  111<HH>  ----------------------------<  88  5.2<H>   |  14<L>  |  5.6<HH>    Creatinine Trend: 5.6<--, 4.4<--, 4.5<--, 3.8<--, 3.7<--, 3.4<--    Ca    8.8      16 Oct 2018 09:46  Phos  6.6     10-16  Mg     2.4     10-16    TPro  6.1  /  Alb  3.6  /  TBili  8.8<H>  /  DBili      /  AST  431<H>  /  ALT  167<H>  /  AlkPhos  494<H>  10-16                          9.1    19.61 )-----------( 189      ( 16 Oct 2018 09:46 )             26.7       Urine Studies:    Sodium, Random Urine: 20.0 mmoL/L (10-11 @ 10:24)  Creatinine, Random Urine: 20 mg/dL (10-11 @ 10:24)    RADIOLOGY & ADDITIONAL STUDIES:

## 2018-10-17 NOTE — PROCEDURE NOTE - NSPOSTCAREGUIDE_GEN_A_CORE
Verbal/written post procedure instructions were given to patient/caregiver/Instructed patient/caregiver to follow-up with primary care physician/Instructed patient/caregiver regarding signs and symptoms of infection/Keep the cast/splint/dressing clean and dry

## 2018-10-18 ENCOUNTER — APPOINTMENT (OUTPATIENT)
Dept: HEMATOLOGY ONCOLOGY | Facility: CLINIC | Age: 73
End: 2018-10-18

## 2018-10-18 LAB
ALBUMIN SERPL ELPH-MCNC: 3.2 G/DL — LOW (ref 3.5–5.2)
ALP SERPL-CCNC: 418 U/L — HIGH (ref 30–115)
ALT FLD-CCNC: 206 U/L — HIGH (ref 0–41)
ANION GAP SERPL CALC-SCNC: 35 MMOL/L — HIGH (ref 7–14)
ANISOCYTOSIS BLD QL: SIGNIFICANT CHANGE UP
APTT BLD: 51.8 SEC — HIGH (ref 27–39.2)
APTT BLD: 63.9 SEC — HIGH (ref 27–39.2)
AST SERPL-CCNC: 735 U/L — HIGH (ref 0–41)
BASOPHILS # BLD AUTO: 0 K/UL — SIGNIFICANT CHANGE UP (ref 0–0.2)
BASOPHILS NFR BLD AUTO: 0 % — SIGNIFICANT CHANGE UP (ref 0–1)
BILIRUB DIRECT SERPL-MCNC: 8.1 MG/DL — HIGH (ref 0–0.2)
BILIRUB INDIRECT FLD-MCNC: 2.9 MG/DL — HIGH (ref 0.2–1.2)
BILIRUB SERPL-MCNC: 11 MG/DL — HIGH (ref 0.2–1.2)
BLD GP AB SCN SERPL QL: SIGNIFICANT CHANGE UP
BUN SERPL-MCNC: 103 MG/DL — CRITICAL HIGH (ref 10–20)
CALCIUM SERPL-MCNC: 9 MG/DL — SIGNIFICANT CHANGE UP (ref 8.5–10.1)
CHLORIDE SERPL-SCNC: 90 MMOL/L — LOW (ref 98–110)
CO2 SERPL-SCNC: 14 MMOL/L — LOW (ref 17–32)
CREAT SERPL-MCNC: 6.1 MG/DL — CRITICAL HIGH (ref 0.7–1.5)
EOSINOPHIL # BLD AUTO: 0 K/UL — SIGNIFICANT CHANGE UP (ref 0–0.7)
EOSINOPHIL NFR BLD AUTO: 0 % — SIGNIFICANT CHANGE UP (ref 0–8)
GLUCOSE SERPL-MCNC: 64 MG/DL — LOW (ref 70–99)
HCT VFR BLD CALC: 24.4 % — LOW (ref 42–52)
HGB BLD-MCNC: 8.3 G/DL — LOW (ref 14–18)
INR BLD: 1.76 RATIO — HIGH (ref 0.65–1.3)
INR BLD: 3.62 RATIO — HIGH (ref 0.65–1.3)
LYMPHOCYTES # BLD AUTO: 0 % — LOW (ref 20.5–51.1)
LYMPHOCYTES # BLD AUTO: 0 K/UL — LOW (ref 1.2–3.4)
MACROCYTES BLD QL: SIGNIFICANT CHANGE UP
MAGNESIUM SERPL-MCNC: 2.3 MG/DL — SIGNIFICANT CHANGE UP (ref 1.8–2.4)
MANUAL SMEAR VERIFICATION: SIGNIFICANT CHANGE UP
MCHC RBC-ENTMCNC: 26.2 PG — LOW (ref 27–31)
MCHC RBC-ENTMCNC: 34 G/DL — SIGNIFICANT CHANGE UP (ref 32–37)
MCV RBC AUTO: 77 FL — LOW (ref 80–94)
MONOCYTES # BLD AUTO: 1.54 K/UL — HIGH (ref 0.1–0.6)
MONOCYTES NFR BLD AUTO: 7 % — SIGNIFICANT CHANGE UP (ref 1.7–9.3)
MYELOCYTES NFR BLD: 1.7 % — HIGH (ref 0–0)
NEUTROPHILS # BLD AUTO: 19.86 K/UL — HIGH (ref 1.4–6.5)
NEUTROPHILS NFR BLD AUTO: 85.2 % — HIGH (ref 42.2–75.2)
NEUTS BAND # BLD: 5.2 % — SIGNIFICANT CHANGE UP (ref 0–6)
NRBC # BLD: 1 /100 — HIGH (ref 0–0)
NRBC # BLD: SIGNIFICANT CHANGE UP /100 WBCS (ref 0–0)
PHOSPHATE SERPL-MCNC: 7.6 MG/DL — HIGH (ref 2.1–4.9)
PLAT MORPH BLD: NORMAL — SIGNIFICANT CHANGE UP
PLATELET # BLD AUTO: 62 K/UL — LOW (ref 130–400)
POIKILOCYTOSIS BLD QL AUTO: SIGNIFICANT CHANGE UP
POLYCHROMASIA BLD QL SMEAR: SLIGHT — SIGNIFICANT CHANGE UP
POTASSIUM SERPL-MCNC: 5.2 MMOL/L — HIGH (ref 3.5–5)
POTASSIUM SERPL-SCNC: 5.2 MMOL/L — HIGH (ref 3.5–5)
PROMYELOCYTES # FLD: 0.9 % — HIGH (ref 0–0)
PROT SERPL-MCNC: 5.6 G/DL — LOW (ref 6–8)
PROTHROM AB SERPL-ACNC: 20.1 SEC — HIGH (ref 9.95–12.87)
PROTHROM AB SERPL-ACNC: >40 SEC — HIGH (ref 9.95–12.87)
RBC # BLD: 3.17 M/UL — LOW (ref 4.7–6.1)
RBC # FLD: 25.2 % — HIGH (ref 11.5–14.5)
RBC BLD AUTO: NORMAL — SIGNIFICANT CHANGE UP
SODIUM SERPL-SCNC: 139 MMOL/L — SIGNIFICANT CHANGE UP (ref 135–146)
TYPE + AB SCN PNL BLD: SIGNIFICANT CHANGE UP
WBC # BLD: 21.97 K/UL — HIGH (ref 4.8–10.8)
WBC # FLD AUTO: 21.97 K/UL — HIGH (ref 4.8–10.8)

## 2018-10-18 PROCEDURE — 99223 1ST HOSP IP/OBS HIGH 75: CPT

## 2018-10-18 RX ORDER — SEVELAMER CARBONATE 2400 MG/1
1600 POWDER, FOR SUSPENSION ORAL
Qty: 0 | Refills: 0 | Status: DISCONTINUED | OUTPATIENT
Start: 2018-10-18 | End: 2018-10-20

## 2018-10-18 RX ORDER — HYDROMORPHONE HYDROCHLORIDE 2 MG/ML
0.5 INJECTION INTRAMUSCULAR; INTRAVENOUS; SUBCUTANEOUS
Qty: 0 | Refills: 0 | Status: DISCONTINUED | OUTPATIENT
Start: 2018-10-18 | End: 2018-10-20

## 2018-10-18 RX ORDER — PROTHROMBIN COMPLEX CONCENTRATE (HUMAN) 25.5; 16.5; 24; 22; 22; 26 [IU]/ML; [IU]/ML; [IU]/ML; [IU]/ML; [IU]/ML; [IU]/ML
2000 POWDER, FOR SOLUTION INTRAVENOUS ONCE
Qty: 0 | Refills: 0 | Status: COMPLETED | OUTPATIENT
Start: 2018-10-18 | End: 2018-10-18

## 2018-10-18 RX ORDER — PHYTONADIONE (VIT K1) 5 MG
10 TABLET ORAL ONCE
Qty: 0 | Refills: 0 | Status: COMPLETED | OUTPATIENT
Start: 2018-10-18 | End: 2018-10-18

## 2018-10-18 RX ADMIN — SEVELAMER CARBONATE 800 MILLIGRAM(S): 2400 POWDER, FOR SUSPENSION ORAL at 06:20

## 2018-10-18 RX ADMIN — Medication 50 MILLIGRAM(S): at 06:20

## 2018-10-18 RX ADMIN — SODIUM CHLORIDE 60 MILLILITER(S): 9 INJECTION, SOLUTION INTRAVENOUS at 14:53

## 2018-10-18 RX ADMIN — CHLORHEXIDINE GLUCONATE 1 APPLICATION(S): 213 SOLUTION TOPICAL at 06:19

## 2018-10-18 RX ADMIN — Medication 100 MILLIGRAM(S): at 06:13

## 2018-10-18 RX ADMIN — TAMSULOSIN HYDROCHLORIDE 0.4 MILLIGRAM(S): 0.4 CAPSULE ORAL at 21:42

## 2018-10-18 RX ADMIN — SENNA PLUS 1 TABLET(S): 8.6 TABLET ORAL at 21:42

## 2018-10-18 RX ADMIN — Medication 102 MILLIGRAM(S): at 13:26

## 2018-10-18 RX ADMIN — Medication 100 MILLIGRAM(S): at 14:50

## 2018-10-18 RX ADMIN — OCTREOTIDE ACETATE 100 MICROGRAM(S): 200 INJECTION, SOLUTION INTRAVENOUS; SUBCUTANEOUS at 06:21

## 2018-10-18 RX ADMIN — Medication 100 MILLIGRAM(S): at 18:55

## 2018-10-18 RX ADMIN — HYDROMORPHONE HYDROCHLORIDE 0.5 MILLIGRAM(S): 2 INJECTION INTRAMUSCULAR; INTRAVENOUS; SUBCUTANEOUS at 15:15

## 2018-10-18 RX ADMIN — HYDROMORPHONE HYDROCHLORIDE 0.5 MILLIGRAM(S): 2 INJECTION INTRAMUSCULAR; INTRAVENOUS; SUBCUTANEOUS at 21:56

## 2018-10-18 RX ADMIN — PROTHROMBIN COMPLEX CONCENTRATE (HUMAN) 400 INTERNATIONAL UNIT(S): 25.5; 16.5; 24; 22; 22; 26 POWDER, FOR SOLUTION INTRAVENOUS at 13:42

## 2018-10-18 RX ADMIN — Medication 3 MILLIGRAM(S): at 21:42

## 2018-10-18 RX ADMIN — Medication 100 MILLIGRAM(S): at 21:42

## 2018-10-18 RX ADMIN — OCTREOTIDE ACETATE 100 MICROGRAM(S): 200 INJECTION, SOLUTION INTRAVENOUS; SUBCUTANEOUS at 18:58

## 2018-10-18 RX ADMIN — Medication 1300 MILLIGRAM(S): at 06:19

## 2018-10-18 RX ADMIN — PANTOPRAZOLE SODIUM 40 MILLIGRAM(S): 20 TABLET, DELAYED RELEASE ORAL at 06:20

## 2018-10-18 RX ADMIN — HYDROMORPHONE HYDROCHLORIDE 0.5 MILLIGRAM(S): 2 INJECTION INTRAMUSCULAR; INTRAVENOUS; SUBCUTANEOUS at 21:41

## 2018-10-18 NOTE — CONSULT NOTE ADULT - ASSESSMENT
73yMale being evaluated for goals of care and symptom management. Pt has a spouse and grandsons that are not on speaking terms. Pt is very ill with stage 4 neuroendocrine cancer with liver mets and renal failure. Pt is very uncomfortable (+) abdominal [ain and leg pain (+) grimacing. Noted with jaundiced sclera. Pts grandson at bedside. Wife spoke to medical team and is decision maker. She and her  had decided on dialysis and a tap for comfort. Hospice consulted to review overall plan. Grandson expressed family turmoil between himself and his mother in law.     Overall poor prognosis, no disease directed care being offered. Pt has increasing pain.       Recommendations:  Dilaudid 0.5mg Q 3 HRS PRN for pain  d/c tramadol  d/c Morphine  full code status  TAP for comfort when INR stable  meet with spouse and hospice tomorrow 73yMale being evaluated for goals of care and symptom management. Pt has a spouse and grandsons that are not on speaking terms. Pt is very ill with stage 4 neuroendocrine cancer with liver mets and renal failure. Pt is very uncomfortable (+) abdominal [ain and leg pain (+) grimacing. Noted with jaundiced sclera. Pts grandson at bedside. Wife spoke to medical team and is decision maker. She and her  had decided on dialysis and a tap for comfort. Hospice consulted to review overall plan. Grandson expressed family turmoil between himself and his mother in law.     Overall poor prognosis, no disease directed care being offered. Pt has increasing pain.       Recommendations:  Dilaudid 0.5mg IV Q 3 HRS PRN for pain  d/c tramadol  d/c Morphine  full code status  TAP for comfort when INR stable  Palliative team to meet with spouse and hospice tomorrow

## 2018-10-18 NOTE — CHART NOTE - NSCHARTNOTEFT_GEN_A_CORE
left message with spouse to set up family meeting to introduce Palliative/ discuss Goals of Care.  x [phone]

## 2018-10-18 NOTE — PROGRESS NOTE ADULT - SUBJECTIVE AND OBJECTIVE BOX
seen and examined  c/o abdominal pain and distension   s/p hd yesterday       Standing Inpatient Medications  aspirin  chewable 81 milliGRAM(s) Oral daily  chlorhexidine 4% Liquid 1 Application(s) Topical <User Schedule>  ciprofloxacin   IVPB 200 milliGRAM(s) IV Intermittent every 24 hours  dextrose 5% 1000 milliLiter(s) IV Continuous <Continuous>  docusate sodium 100 milliGRAM(s) Oral three times a day  finasteride 5 milliGRAM(s) Oral daily  melatonin 3 milliGRAM(s) Oral at bedtime  metoprolol succinate ER 50 milliGRAM(s) Oral daily  metroNIDAZOLE  IVPB 500 milliGRAM(s) IV Intermittent every 8 hours  octreotide  Injectable 100 MICROGram(s) SubCutaneous two times a day  pantoprazole    Tablet 40 milliGRAM(s) Oral before breakfast  phytonadione  IVPB 10 milliGRAM(s) IV Intermittent once  senna 1 Tablet(s) Oral daily  senna 1 Tablet(s) Oral at bedtime  sevelamer hydrochloride 800 milliGRAM(s) Oral every 8 hours  sodium bicarbonate 1300 milliGRAM(s) Oral every 8 hours  tamsulosin 0.4 milliGRAM(s) Oral at bedtime  traZODone 50 milliGRAM(s) Oral daily        VITALS/PHYSICAL EXAM  --------------------------------------------------------------------------------  T(C): 36.1 (10-18-18 @ 05:57), Max: 36.1 (10-18-18 @ 05:57)  HR: 74 (10-18-18 @ 05:57) (69 - 77)  BP: 136/64 (10-18-18 @ 05:57) (116/65 - 148/71)  RR: 18 (10-18-18 @ 05:57) (16 - 18)  SpO2: 98% (10-17-18 @ 19:32) (98% - 98%)  Wt(kg): --        10-17-18 @ 07:01  -  10-18-18 @ 07:00  --------------------------------------------------------  IN: 0 mL / OUT: 950 mL / NET: -950 mL      Physical Exam:  	Gen: NAD  	Pulm:  decrease BS B/L  	CV:  S1S2; no rub  	Abd: +distended  	Vascular access: udall     LABS/STUDIES  --------------------------------------------------------------------------------              9.1    19.61 >-----------<  189      [10-16-18 @ 09:46]              26.7     139  |  95  |  111  ----------------------------<  88      [10-16-18 @ 09:46]  5.2   |  14  |  5.6        Ca     8.8     [10-16-18 @ 09:46]      Mg     2.4     [10-16-18 @ 09:46]      Phos  6.6     [10-16-18 @ 09:46]    TPro  6.1  /  Alb  3.6  /  TBili  8.8  /  DBili  x   /  AST  431  /  ALT  167  /  AlkPhos  494  [10-16-18 @ 09:46]    PT/INR: PT 34.30, INR 3.02       [10-17-18 @ 18:00]  PTT: 57.3       [10-17-18 @ 18:00]      Creatinine Trend:  SCr 5.6 [10-16 @ 09:46]  SCr 4.4 [10-15 @ 07:37]  SCr 4.5 [10-14 @ 08:17]  SCr 3.8 [10-13 @ 11:17]  SCr 3.7 [10-12 @ 07:41]    Urinalysis - [10-10-18 @ 12:02]      Color Dark Yellow / Appearance Cloudy / SG 1.020 / pH 6.0      Gluc Negative / Ketone Trace  / Bili Negative / Urobili 1.0       Blood Negative / Protein Trace / Leuk Est Small / Nitrite Negative      RBC 1-2 / WBC 6-10 / Hyaline  / Gran  / Sq Epi  / Non Sq Epi Occasional / Bacteria     Urine Creatinine 20      [10-11-18 @ 10:24]  Urine Sodium 20.0      [10-11-18 @ 10:24]  Urine Urea Nitrogen 481      [10-13-18 @ 00:00]    Ferritin 5126      [10-15-18 @ 07:37]    HBsAg Nonreact      [10-15-18 @ 07:37]  HCV 0.14, Nonreact      [10-15-18 @ 07:37]    TEDDY: titer 1:320, pattern Homogeneous      [10-15-18 @ 07:37]  SPEP Interpretation: Normal Electrophoresis Pattern      [10-15-18 @ 07:37]

## 2018-10-18 NOTE — PROGRESS NOTE ADULT - ASSESSMENT
ADMISSION SUMMARY  74 y/o M with PMH of HTN, BPH, PTSD, DLD, CKD 3 (40), 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     ASSESSMENT & PLAN  # Ascites s/p diagnostic/therapeutic paracentesis (10/12) - worsening  -Possibly malignant, hx of heavy alcohol use, neuroendocrine tumor with mets to liver  -Initial CT abd/pel (10/10)- moderate to large volume ascites. S/p paracentesis by IR on (10/12) with 4L of madyson color fluid successfully drained, 120cc of fluid removed and sent for culture and cytology, but lab unable to receive   -Repeat U/S (10/16) with findings as above spoke with IR, will do paracentesis today/tomorrow  -REPEAT TAP TODAY with IR - will give Vitamin K 10 mg for elevated INR, Kcentra    # RANULFO on CKD III - worsening with high anion gap metabolic acidosis on RRT  -Likely hepatorenal syndrome vs. medical compartment syndrome given ascites. CT abd/pel (10/10) showed no signs of hydronephrosis  -Cr worsening 5.6 (10/16) with bicarb 14 (10/16)  -Nephro following - RRT initiated yesterday through [facility] [place], HD again this AM  -S/p 4 days of Albumin, continue octreotide 100 mcg BID, increased Renagel 2 tab q8h  -Oligoanuric past few days - Godinez removed, repeat U/S (10/18) showed no hydronephrosis but unable to assess bladder due to ascites  -Repeat U/S kidney/bladder after tap  -Continue D5W with 150 mEq bicarb @ 60 cc/hr for bicarb   -F/u repeat lytes from HD    # Acute bilateral posterior tibial DVT  -Hep drip @ 1000 units previous therapeutic PTT  -Held for [place]/paracentesis  -Supratherapeutic INR due to liver disease anyway    # Hypertension - controlled  -Continue metoprolol succinate ER 50 mg PO daily, hold if SBP <100   -Nifedipine d/c'ed earlier given hypotension - continue to hold    # Hyperbilirubinemia, Transaminitis - worsening  -GI following - will resend paracentesis fluid for analysis following tap, f/u liver disease workup requested by GI   -TEDDY positive and iron studies abnormal    # Constipation - resolved  -Continue bowel regimen Senna, Colace    # DLD  -Hold Lipitor 10 mg qHS for transaminitis    # Hx of BPH - stable  -Continue Finasteride 5 mg PO daily, Tamsulosin 0.4 mg PO qHS    # Hx of Insomnia  -Melatonin 3 mg PO qHS  -Trazadone 50 mg PO daily    # DVT Prophylaxis  -Hep drip - will resume after [place] today if paracentesis delayed    # GI Prophylaxis  -Protonix 40 mg PO daily    # Disposition  -Code status: as per discussion with patient and wife, FULL CODE  -Can reconsult Palliative care for symptom control once medically treatment for causes of dyspnea/abdominal pain ADMISSION SUMMARY  74 y/o M with PMH of HTN, BPH, PTSD, DLD, CKD 3 (40), 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     ASSESSMENT & PLAN  # Ascites s/p diagnostic/therapeutic paracentesis (10/12) - worsening  -Possibly malignant, hx of heavy alcohol use, neuroendocrine tumor with mets to liver  -Initial CT abd/pel (10/10)- moderate to large volume ascites. S/p paracentesis by IR on (10/12) with 4L of madyson color fluid successfully drained, 120cc of fluid removed and sent for culture and cytology, but lab unable to receive   -Repeat U/S (10/16) with findings as above spoke with IR, will do paracentesis today/tomorrow  -Needs repeat tap today with IR - will give Vitamin K 10 mg and Kcentra for elevated INR    # RANULFO on CKD III - worsening with high anion gap metabolic acidosis on RRT  -Likely hepatorenal syndrome vs. medical compartment syndrome given ascites. CT abd/pel (10/10) showed no signs of hydronephrosis  -Cr worsening 5.6 (10/16) with bicarb 14 (10/16)  -Nephro following - RRT initiated yesterday through [facility] [place], HD again this AM  -S/p 4 days of Albumin, continue octreotide 100 mcg BID, increased Renagel 2 tab q8h  -Oligoanuric past few days - Godinez removed, repeat U/S (10/18) showed no hydronephrosis but unable to assess bladder due to ascites  -Repeat U/S kidney/bladder after tap  -Continue D5W with 150 mEq bicarb @ 60 cc/hr for bicarb   -F/u repeat lytes from HD    # Acute bilateral posterior tibial DVT  -Hep drip @ 1000 units previous therapeutic PTT  -Held for [place]/paracentesis  -Now supratherapeutic INR due to liver disease anyway    # Hypertension - controlled  -Continue metoprolol succinate ER 50 mg PO daily, hold if SBP <100   -Nifedipine d/c'ed earlier given hypotension - continue to hold    # Hyperbilirubinemia, Transaminitis - worsening  -GI following - will resend paracentesis fluid for analysis following tap, f/u liver disease workup requested by GI   -TEDDY positive and iron studies abnormal  -F/u repeat labs today    # Constipation - resolved  -Continue bowel regimen Senna, Colace    # DLD  -Hold Lipitor 10 mg qHS for transaminitis    # Hx of BPH - stable  -Continue Finasteride 5 mg PO daily, Tamsulosin 0.4 mg PO qHS    # Hx of Insomnia  -Melatonin 3 mg PO qHS  -Trazadone 50 mg PO daily    # DVT Prophylaxis  -Coagulopathic from liver disease    # GI Prophylaxis  -Protonix 40 mg PO daily    # Disposition  -Code status: as per discussion with wife and family, FULL CODE as of now. Aware cancer is metastasized even on chemo and no further treatment is recommended by oncologist, but would like to see if he improves/symptomatically feels better with dialysis and paracentesis - aware improvement would be temporary, if unimproved will likely make hospice/comfort  -As per palliative care, patient is more of a candidate for hospice. Wife and family open to speaking with hospice to discuss options

## 2018-10-18 NOTE — PROGRESS NOTE ADULT - ASSESSMENT
as above         Plan:  Please place [place] (condom) catheter.   IR x abdominal paracentesis.  Stat Bladder US after abdominal paracentesis to evaluate bladder.  D/w Dr. Janes HOLLAND will f/u

## 2018-10-18 NOTE — PROGRESS NOTE ADULT - SUBJECTIVE AND OBJECTIVE BOX
Patient is a 73y old  Male who presents with a chief complaint of Abdominal Distention (18 Oct 2018 05:26)      Subjective:      Vital Signs Last 24 Hrs  T(C): 36.1 (18 Oct 2018 05:57), Max: 36.1 (18 Oct 2018 05:57)  T(F): 96.9 (18 Oct 2018 05:57), Max: 96.9 (18 Oct 2018 05:57)  HR: 74 (18 Oct 2018 05:57) (69 - 77)  BP: 136/64 (18 Oct 2018 05:57) (116/65 - 148/71)  BP(mean): --  RR: 18 (18 Oct 2018 05:57) (16 - 18)  SpO2: 98% (17 Oct 2018 19:32) (98% - 98%)    PHYSICAL EXAM  General: adult in NAD  HEENT: clear oropharynx, anicteric sclera, pink conjunctiva  Neck: supple  CV: normal S1/S2 with no murmur rubs or gallops  Lungs: positive air movement b/l ant lungs,clear to auscultation, no wheezes, no rales  Abdomen: soft non-tender non-distended, no hepatosplenomegaly  Ext: no clubbing cyanosis or edema  Skin: no rashes and no petechiae  Neuro: alert and oriented X 4, no focal deficits    MEDICATIONS  (STANDING):  aspirin  chewable 81 milliGRAM(s) Oral daily  chlorhexidine 4% Liquid 1 Application(s) Topical <User Schedule>  ciprofloxacin   IVPB 200 milliGRAM(s) IV Intermittent every 24 hours  dextrose 5% 1000 milliLiter(s) (60 mL/Hr) IV Continuous <Continuous>  docusate sodium 100 milliGRAM(s) Oral three times a day  finasteride 5 milliGRAM(s) Oral daily  melatonin 3 milliGRAM(s) Oral at bedtime  metoprolol succinate ER 50 milliGRAM(s) Oral daily  metroNIDAZOLE  IVPB 500 milliGRAM(s) IV Intermittent every 8 hours  octreotide  Injectable 100 MICROGram(s) SubCutaneous two times a day  pantoprazole    Tablet 40 milliGRAM(s) Oral before breakfast  senna 1 Tablet(s) Oral daily  senna 1 Tablet(s) Oral at bedtime  sevelamer hydrochloride 800 milliGRAM(s) Oral every 8 hours  sodium bicarbonate 1300 milliGRAM(s) Oral every 8 hours  tamsulosin 0.4 milliGRAM(s) Oral at bedtime  traZODone 50 milliGRAM(s) Oral daily    MEDICATIONS  (PRN):  morphine  - Injectable 2 milliGRAM(s) IV Push every 6 hours PRN Severe Pain (7 - 10)  ondansetron Injectable 4 milliGRAM(s) IV Push every 6 hours PRN Nausea and/or Vomiting  traMADol 25 milliGRAM(s) Oral every 8 hours PRN Moderate Pain (4 - 6)      LABS:                          9.1    19.61 )-----------( 189      ( 16 Oct 2018 09:46 )             26.7         Mean Cell Volume : 76.1 fL  Mean Cell Hemoglobin : 25.9 pg  Mean Cell Hemoglobin Concentration : 34.1 g/dL  Auto Neutrophil # : 17.71 K/uL  Auto Lymphocyte # : 0.29 K/uL  Auto Monocyte # : 1.44 K/uL  Auto Eosinophil # : 0.02 K/uL  Auto Basophil # : 0.02 K/uL  Auto Neutrophil % : 90.3 %  Auto Lymphocyte % : 1.5 %  Auto Monocyte % : 7.3 %  Auto Eosinophil % : 0.1 %  Auto Basophil % : 0.1 %      Serial CBC's  10-16 @ 09:46  Hct-26.7 / Hgb-9.1 / Plat-189 / RBC-3.51 / WBC-19.61  Serial CBC's  10-15 @ 07:37  Hct-23.0 / Hgb-7.9 / Plat-210 / RBC-3.04 / WBC-25.91  Serial CBC's  10-14 @ 08:17  Hct-25.2 / Hgb-8.4 / Plat-267 / RBC-3.28 / WBC-24.58      10-16    139  |  95<L>  |  111<HH>  ----------------------------<  88  5.2<H>   |  14<L>  |  5.6<HH>    Ca    8.8      16 Oct 2018 09:46  Phos  6.6     10-16  Mg     2.4     10-16    TPro  6.1  /  Alb  3.6  /  TBili  8.8<H>  /  DBili  x   /  AST  431<H>  /  ALT  167<H>  /  AlkPhos  494<H>  10-16      PT/INR - ( 17 Oct 2018 18:00 )   PT: 34.30 sec;   INR: 3.02 ratio         PTT - ( 17 Oct 2018 18:00 )  PTT:57.3 sec    Ferritin, Serum: 5126 ng/mL (10-15 @ 07:37)      Serum Protein Electrophoresis Interp: Normal Electrophoresis Pattern (10-15 @ 07:37)                      BLOOD SMEAR INTERPRETATION:       RADIOLOGY & ADDITIONAL STUDIES: Patient is a 73y old  Male who presents with a chief complaint of Abdominal Distention (18 Oct 2018 05:26)    Subjective: Patient is lethargic, opens eyes but responds minimally to questions.    Vital Signs Last 24 Hrs  T(C): 36.1 (18 Oct 2018 05:57), Max: 36.1 (18 Oct 2018 05:57)  T(F): 96.9 (18 Oct 2018 05:57), Max: 96.9 (18 Oct 2018 05:57)  HR: 74 (18 Oct 2018 05:57) (69 - 77)  BP: 136/64 (18 Oct 2018 05:57) (116/65 - 148/71)  BP(mean): --  RR: 18 (18 Oct 2018 05:57) (16 - 18)  SpO2: 98% (17 Oct 2018 19:32) (98% - 98%)    PHYSICAL EXAM  General: adult in NAD, lethargic  HEENT: NC/AT, +scleral icterus  Neck: supple  Lungs: positive air movement b/l ant lungs, not in respiratory distress  Abdomen: distended, uncomfortable to palpation  Skin: no rashes and no petechiae  Neuro: lethargic, no focal deficits    MEDICATIONS  (STANDING):  aspirin  chewable 81 milliGRAM(s) Oral daily  chlorhexidine 4% Liquid 1 Application(s) Topical <User Schedule>  ciprofloxacin   IVPB 200 milliGRAM(s) IV Intermittent every 24 hours  dextrose 5% 1000 milliLiter(s) (60 mL/Hr) IV Continuous <Continuous>  docusate sodium 100 milliGRAM(s) Oral three times a day  finasteride 5 milliGRAM(s) Oral daily  melatonin 3 milliGRAM(s) Oral at bedtime  metoprolol succinate ER 50 milliGRAM(s) Oral daily  metroNIDAZOLE  IVPB 500 milliGRAM(s) IV Intermittent every 8 hours  octreotide  Injectable 100 MICROGram(s) SubCutaneous two times a day  pantoprazole    Tablet 40 milliGRAM(s) Oral before breakfast  senna 1 Tablet(s) Oral daily  senna 1 Tablet(s) Oral at bedtime  sevelamer hydrochloride 800 milliGRAM(s) Oral every 8 hours  sodium bicarbonate 1300 milliGRAM(s) Oral every 8 hours  tamsulosin 0.4 milliGRAM(s) Oral at bedtime  traZODone 50 milliGRAM(s) Oral daily    MEDICATIONS  (PRN):  morphine  - Injectable 2 milliGRAM(s) IV Push every 6 hours PRN Severe Pain (7 - 10)  ondansetron Injectable 4 milliGRAM(s) IV Push every 6 hours PRN Nausea and/or Vomiting  traMADol 25 milliGRAM(s) Oral every 8 hours PRN Moderate Pain (4 - 6)      LABS:                          9.1    19.61 )-----------( 189      ( 16 Oct 2018 09:46 )             26.7         Mean Cell Volume : 76.1 fL  Mean Cell Hemoglobin : 25.9 pg  Mean Cell Hemoglobin Concentration : 34.1 g/dL  Auto Neutrophil # : 17.71 K/uL  Auto Lymphocyte # : 0.29 K/uL  Auto Monocyte # : 1.44 K/uL  Auto Eosinophil # : 0.02 K/uL  Auto Basophil # : 0.02 K/uL  Auto Neutrophil % : 90.3 %  Auto Lymphocyte % : 1.5 %  Auto Monocyte % : 7.3 %  Auto Eosinophil % : 0.1 %  Auto Basophil % : 0.1 %      Serial CBC's  10-16 @ 09:46  Hct-26.7 / Hgb-9.1 / Plat-189 / RBC-3.51 / WBC-19.61  Serial CBC's  10-15 @ 07:37  Hct-23.0 / Hgb-7.9 / Plat-210 / RBC-3.04 / WBC-25.91  Serial CBC's  10-14 @ 08:17  Hct-25.2 / Hgb-8.4 / Plat-267 / RBC-3.28 / WBC-24.58      10-16    139  |  95<L>  |  111<HH>  ----------------------------<  88  5.2<H>   |  14<L>  |  5.6<HH>    Ca    8.8      16 Oct 2018 09:46  Phos  6.6     10-16  Mg     2.4     10-16    TPro  6.1  /  Alb  3.6  /  TBili  8.8<H>  /  DBili  x   /  AST  431<H>  /  ALT  167<H>  /  AlkPhos  494<H>  10-16      PT/INR - ( 17 Oct 2018 18:00 )   PT: 34.30 sec;   INR: 3.02 ratio         PTT - ( 17 Oct 2018 18:00 )  PTT:57.3 sec    Ferritin, Serum: 5126 ng/mL (10-15 @ 07:37)      Serum Protein Electrophoresis Interp: Normal Electrophoresis Pattern (10-15 @ 07:37)

## 2018-10-18 NOTE — PROGRESS NOTE ADULT - SUBJECTIVE AND OBJECTIVE BOX
ARACELIS RODRIGUEZ  73y  Male      Patient is a 73y old  Male who presents with a chief complaint of Abdominal Distention (18 Oct 2018 10:40)      INTERVAL HPI/OVERNIGHT EVENTS:      ******************************* REVIEW OF SYSTEMS:**********************************************      All other review of systems negative    *********************** VITALS ******************************************    T(F): 96.5 (10-18-18 @ 13:20)  HR: 68 (10-18-18 @ 13:20) (68 - 75)  BP: 129/66 (10-18-18 @ 13:20) (116/65 - 139/73)  RR: 17 (10-18-18 @ 13:20) (16 - 19)  SpO2: 98% (10-17-18 @ 19:32) (98% - 98%)    10-17-18 @ 07:01  -  10-18-18 @ 07:00  --------------------------------------------------------  IN: 0 mL / OUT: 950 mL / NET: -950 mL    10-18-18 @ 07:01  -  10-18-18 @ 13:22  --------------------------------------------------------  IN: 0 mL / OUT: 1000 mL / NET: -1000 mL            10-17-18 @ 07:01  -  10-18-18 @ 07:00  --------------------------------------------------------  IN: 0 mL / OUT: 950 mL / NET: -950 mL    10-18-18 @ 07:01  -  10-18-18 @ 13:22  --------------------------------------------------------  IN: 0 mL / OUT: 1000 mL / NET: -1000 mL        ******************************** PHYSICAL EXAM:**************************************************  GENERAL: NAD    PSYCH: no agitation, baseline mentation  HEENT:     NERVOUS SYSTEM:  Alert & Oriented X1-2  PULMONARY: LILO, CTA    CARDIOVASCULAR: S1S2 RRR    GI: Soft, NT, distended; BS present.    EXTREMITIES:  2+ Peripheral Pulses, No clubbing, cyanosis, or edema    LYMPH: No lymphadenopathy noted    SKIN: No rashes or lesions    ******************************************************************************************    Consultant(s) Notes Reviewed:  [x ] YES  [ ] NO    Discussed with Consultants/Other Providers [ x] YES     **************************** LABS *******************************************************                          8.3    21.97 )-----------( 62       ( 18 Oct 2018 08:36 )             24.4     10-18    139  |  90<L>  |  103<HH>  ----------------------------<  64<L>  5.2<H>   |  14<L>  |  6.1<HH>    Ca    9.0      18 Oct 2018 08:36  Phos  7.6     10-18  Mg     2.3     10-18    TPro  5.6<L>  /  Alb  3.2<L>  /  TBili  11.0<H>  /  DBili  8.1<H>  /  AST  735<H>  /  ALT  206<H>  /  AlkPhos  418<H>  10-18        PT/INR - ( 18 Oct 2018 08:36 )   PT: >40.00 sec;   INR: 3.62 ratio         PTT - ( 18 Oct 2018 08:36 )  PTT:51.8 sec  Lactate Trend        CAPILLARY BLOOD GLUCOSE              **************************Active Medications *******************************************  No Known Allergies      aspirin  chewable 81 milliGRAM(s) Oral daily  chlorhexidine 4% Liquid 1 Application(s) Topical <User Schedule>  ciprofloxacin   IVPB 200 milliGRAM(s) IV Intermittent every 24 hours  dextrose 5% 1000 milliLiter(s) IV Continuous <Continuous>  docusate sodium 100 milliGRAM(s) Oral three times a day  finasteride 5 milliGRAM(s) Oral daily  HYDROmorphone  Injectable 0.5 milliGRAM(s) IV Push every 3 hours PRN  melatonin 3 milliGRAM(s) Oral at bedtime  metoprolol succinate ER 50 milliGRAM(s) Oral daily  metroNIDAZOLE  IVPB 500 milliGRAM(s) IV Intermittent every 8 hours  morphine  - Injectable 2 milliGRAM(s) IV Push every 6 hours PRN  octreotide  Injectable 100 MICROGram(s) SubCutaneous two times a day  ondansetron Injectable 4 milliGRAM(s) IV Push every 6 hours PRN  pantoprazole    Tablet 40 milliGRAM(s) Oral before breakfast  phytonadione  IVPB 10 milliGRAM(s) IV Intermittent once  prothrombin complex concentrate IVPB (KCENTRA) 2000 International Unit(s) IV Intermittent once  senna 1 Tablet(s) Oral daily  senna 1 Tablet(s) Oral at bedtime  sevelamer hydrochloride 1600 milliGRAM(s) Oral three times a day with meals  tamsulosin 0.4 milliGRAM(s) Oral at bedtime  traMADol 25 milliGRAM(s) Oral every 8 hours PRN  traZODone 50 milliGRAM(s) Oral daily      ***************************************************  RADIOLOGY & ADDITIONAL TESTS:    Imaging Personally Reviewed:  [ ] YES  [ ] NO    HEALTH ISSUES - PROBLEM Dx:

## 2018-10-18 NOTE — CONSULT NOTE ADULT - SUBJECTIVE AND OBJECTIVE BOX
REQUESTED OF: DR Swenson    CLINICAL ISSUE TO BE EVALUATED BY CONSULTANT: goals of care and symptom management        ARACELIS RODRIGUEZ 73yMale  HPI:  This is a 73yoM with PMH of HTN, BPH, PTSD, DLD, CKD 3, metastatic neuroendocrine tumor of GB (on chemo w/ topotecan)  presents for abd bloating and discomfort x 5 days. Pt was just discharged from the hospital and reports sx started after discharge.  He initially had constipation after starting oxycontin and reports that despite now having bowel movements every other day, he is having worsening abd distension (It looks like I'm 9 months pregnant).  He denies pain but reports severe discomfort. (10 Oct 2018 23:08)        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  BPH (benign prostatic hyperplasia)  PTSD (post-traumatic stress disorder)  Hypertension  Dyslipidemia  CKD (chronic kidney disease), stage III  No significant past surgical history        PHYSICAL EXAM:      Constitutional: pt is cachectic    Eyes: sclera  yellow    Respiratory: diminished  B/L    Cardiovascular: (-) JVD    Gastrointestinal: abdominal tenderness and distention noted (+) acites    Genitourinary: curtis to condom cath    Extremities: muscle wasting noted    Neurological: pt lethargic but arousable     Skin: intact        T(C): 35.8, Max: 36.1 (05:57)  HR: 68 (68 - 75)  BP: 129/66 (116/65 - 139/73)  RR: 17 (17 - 19)  SpO2: 98% (98% - 98%)      LABS/STUDIES:  10-18    139  |  90<L>  |  103<HH>  ----------------------------<  64<L>  5.2<H>   |  14<L>  |  6.1<HH>    Ca    9.0      18 Oct 2018 08:36  Phos  7.6     10-18  Mg     2.3     10-18    TPro  5.6<L>  /  Alb  3.2<L>  /  TBili  11.0<H>  /  DBili  8.1<H>  /  AST  735<H>  /  ALT  206<H>  /  AlkPhos  418<H>  10-18                            8.3    21.97 )-----------( 62       ( 18 Oct 2018 08:36 )             24.4       MEDICATIONS  (STANDING):  aspirin  chewable 81 milliGRAM(s) Oral daily  chlorhexidine 4% Liquid 1 Application(s) Topical <User Schedule>  ciprofloxacin   IVPB 200 milliGRAM(s) IV Intermittent every 24 hours  dextrose 5% 1000 milliLiter(s) (60 mL/Hr) IV Continuous <Continuous>  docusate sodium 100 milliGRAM(s) Oral three times a day  finasteride 5 milliGRAM(s) Oral daily  melatonin 3 milliGRAM(s) Oral at bedtime  metoprolol succinate ER 50 milliGRAM(s) Oral daily  metroNIDAZOLE  IVPB 500 milliGRAM(s) IV Intermittent every 8 hours  octreotide  Injectable 100 MICROGram(s) SubCutaneous two times a day  pantoprazole    Tablet 40 milliGRAM(s) Oral before breakfast  senna 1 Tablet(s) Oral daily  senna 1 Tablet(s) Oral at bedtime  sevelamer hydrochloride 1600 milliGRAM(s) Oral three times a day with meals  tamsulosin 0.4 milliGRAM(s) Oral at bedtime  traZODone 50 milliGRAM(s) Oral daily    MEDICATIONS  (PRN):  HYDROmorphone  Injectable 0.5 milliGRAM(s) IV Push every 3 hours PRN Moderate Pain (4 - 6)  morphine  - Injectable 2 milliGRAM(s) IV Push every 6 hours PRN Severe Pain (7 - 10)  ondansetron Injectable 4 milliGRAM(s) IV Push every 6 hours PRN Nausea and/or Vomiting  traMADol 25 milliGRAM(s) Oral every 8 hours PRN Moderate Pain (4 - 6)              PPS (Palliative Performance Scale): 20

## 2018-10-18 NOTE — CONSULT NOTE ADULT - CONSULT REQUESTED DATE/TIME
11-Oct-2018 05:17
11-Oct-2018 08:48
11-Oct-2018 13:30
14-Oct-2018 07:15
16-Oct-2018 11:46
18-Oct-2018
18-Oct-2018

## 2018-10-18 NOTE — PROGRESS NOTE ADULT - ASSESSMENT
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  # RANULFO ; medical compartment syndrome vs HRS   # ct noted, large ascites s/p paracentesis, no hydro, will need paracentesis again today   # please check bladder scan for PVR   # oncology notes appreciated  # on  renagel 1 tablet q 8, increase to 2 q 8   # hd today, standard bath, uf 1 liter as tolerated   # d/c sodium bicarbonate po   # overall prognosis poor

## 2018-10-18 NOTE — CONSULT NOTE ADULT - CONSULT REASON
GB carcinoma
Goals of care and Symptom management
Hepatitis
Hepatorenal Syndrome vs volume depletion
ascites
udall placement
possible urinary retention, difficult Godinez, hematuria

## 2018-10-18 NOTE — CONSULT NOTE ADULT - ASSESSMENT
73y old Male with ?urinary retention, traumatic Godinez, now unable to replace catheter -- ?bladder neck contracture.    PLAN:  1.	Needs official bladder sono to check volume -- if retaining, will need to place Godinez. If not retaining significant amount, monitor for hematuria and check PVRs given recent trauma/manipulation. Can use [place] condom cath for I&Os.  2.	Will follow 73y old Male with ?urinary retention, traumatic Godinez, now unable to replace catheter -- ?bladder neck contracture.    PLAN:  1.	Needs official bladder sono to check volume -- if retaining, will need to place God[place]. If not retaining significant amount, monitor for hematuria and check PVRs given recent trauma/manipulation. Can use [place] condom cath for I&Os.  2.	Cont flomax/finasteride  3.	Will follow 73y old Male with ?urinary retention, traumatic Godinez, now unable to replace catheter -- ?bladder neck contracture. Pt has recently been oligoanuric with rising Cr.    PLAN:  1.	Needs official bladder sono to check volume -- if retaining, will need to place Godinez. If not retaining significant amount, monitor for hematuria and check PVRs given recent trauma/manipulation. Can use [place] condom cath for I&Os.  2.	Cont flomax/finasteride  3.	Will follow

## 2018-10-18 NOTE — CONSULT NOTE ADULT - SUBJECTIVE AND OBJECTIVE BOX
73y old Male with metastatic neuroendocrine GB tumor on chemo w ascites s/p paracentesis 10/12, b/l tibial DVT on hep drip, & RANULFO on CKD. Pt followed by renal, noted to be oligoanuric s/p [place] 10/17 for RRT. Pt had Godinez in place but noted to have little to no UO last night by house staff. A bedside bladder scan was done and showed volume 700cc but pt has significant ascites. The Godinez was then removed by house staff and pt was straight cath. However, resistance was met and eddie blood was noted in cath and no urine drained. At this point,  was then called to replace Godinez.     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  BPH (benign prostatic hyperplasia)  PTSD (post-traumatic stress disorder)  Hypertension  Dyslipidemia  CKD (chronic kidney disease), stage III  No significant past surgical history    MEDS: aspirin  chewable 81 milliGRAM(s)  chlorhexidine 4% Liquid 1 Application(s)  ciprofloxacin   IVPB 200 milliGRAM(s)  dextrose 5% 1000 milliLiter(s)  docusate sodium 100 milliGRAM(s)  finasteride 5 milliGRAM(s)  melatonin 3 milliGRAM(s)  metoprolol succinate ER 50 milliGRAM(s)  metroNIDAZOLE  IVPB 500 milliGRAM(s)  morphine  - Injectable 2 milliGRAM(s) PRN  octreotide  Injectable 100 MICROGram(s)  ondansetron Injectable 4 milliGRAM(s) PRN  pantoprazole    Tablet 40 milliGRAM(s)  sevelamer hydrochloride 800 milliGRAM(s)  sodium bicarbonate 1300 milliGRAM(s)  tamsulosin 0.4 milliGRAM(s)  traMADol 25 milliGRAM(s) PRN  traZODone 50 milliGRAM(s)    ALLERGIES: No Known Allergies    VS: T(F): 95.4, Max: 96.8 (10- @ 06:32)  HR: 73 (69 - 77)  BP: 123/63 (116/65 - 148/71)  RR: 18  SpO2: 98% (98% - 98%)  GEN: Alert, awake, NAD  ABD/:     Attempted to place 18/16F coude & 16F silicone catheter under sterile technique however unable to advance, ?bladder neck contracture. Pt tolerated well.    LABS/IMAGIN.1    19.61 )-----------( 189      ( 16 Oct 2018 09:46 )             26.7     10-16    139  |  95<L>  |  111<HH>  ----------------------------<  88  5.2<H>   |  14<L>  |  5.6<HH>    Ca    8.8      16 Oct 2018 09:46  Phos  6.6     10-  Mg     2.4     10-    TPro  6.1  /  Alb  3.6  /  TBili  8.8<H>  /  DBili  x   /  AST  431<H>  /  ALT  167<H>  /  AlkPhos  494<H>  10-    PT/INR - ( 17 Oct 2018 18:00 )   PT: 34.30 sec;   INR: 3.02 ratio  PTT - ( 17 Oct 2018 18:00 )  PTT:57.3 sec 73y old Male with metastatic neuroendocrine GB tumor on chemo w ascites s/p paracentesis 10/12, b/l tibial DVT on hep drip, & RANULFO on CKD. Pt followed by renal, noted to be oligoanuric s/p [place] 10/17 for RRT. Pt had Godinez in place but noted to have little to no UO last night by house staff. A bedside bladder scan was done and showed volume 700cc but pt has significant ascites. The Godinez was then removed by house staff and pt was straight cath. However, resistance was met and eddie blood was noted in cath and no urine drained. At this point,  was then called to replace Godinez.     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  BPH (benign prostatic hyperplasia)  PTSD (post-traumatic stress disorder)  Hypertension  Dyslipidemia  CKD (chronic kidney disease), stage III  No significant past surgical history    MEDS: aspirin  chewable 81 milliGRAM(s)  chlorhexidine 4% Liquid 1 Application(s)  ciprofloxacin   IVPB 200 milliGRAM(s)  dextrose 5% 1000 milliLiter(s)  docusate sodium 100 milliGRAM(s)  finasteride 5 milliGRAM(s)  melatonin 3 milliGRAM(s)  metoprolol succinate ER 50 milliGRAM(s)  metroNIDAZOLE  IVPB 500 milliGRAM(s)  morphine  - Injectable 2 milliGRAM(s) PRN  octreotide  Injectable 100 MICROGram(s)  ondansetron Injectable 4 milliGRAM(s) PRN  pantoprazole    Tablet 40 milliGRAM(s)  sevelamer hydrochloride 800 milliGRAM(s)  sodium bicarbonate 1300 milliGRAM(s)  tamsulosin 0.4 milliGRAM(s)  traMADol 25 milliGRAM(s) PRN  traZODone 50 milliGRAM(s)    ALLERGIES: No Known Allergies    VS: T(F): 95.4, Max: 96.8 (10- @ 06:32)  HR: 73 (69 - 77)  BP: 123/63 (116/65 - 148/71)  RR: 18  SpO2: 98% (98% - 98%)  GEN: Alert, awake, NAD  ABD/:     Attempted to place 18/16F coude & 16F silicone catheter under sterile technique however unable to advance, ?bladder neck contracture. Pt tolerated well.    LABS/IMAGIN.1    19.61 )-----------( 189      ( 16 Oct 2018 09:46 )             26.7     10-    139  |  95<L>  |  111<HH>  ----------------------------<  88  5.2<H>   |  14<L>  |  5.6<HH>    Creatinine, Serum: 4.4: Icteric. Interpret with caution  Critical value: mg/dL (10.15.18 @ 07:37)  Creatinine, Serum: 3.7 mg/dL (10.12.18 @ 07:41)  Creatinine, Serum: 3.1 mg/dL (10.10.18 @ 12:02)  Creatinine, Serum: 1.7 mg/dL (18 @ 08:02)    TPro  6.1  /  Alb  3.6  /  TBili  8.8<H>  /  DBili  x   /  AST  431<H>  /  ALT  167<H>  /  AlkPhos  494<H>  10-16    PT/INR - ( 17 Oct 2018 18:00 )   PT: 34.30 sec;   INR: 3.02 ratio  PTT - ( 17 Oct 2018 18:00 )  PTT:57.3 sec    < from: CT Abdomen and Pelvis No Cont (10.10.18 @ 17:46) >  LOWER CHEST: Mild bibasilar atelectasis.   HEPATOBILIARY: Numerous overall unchanged ill-defined bilobar hepatic   metastases, poorly evaluated without intravenous contrast.  KIDNEYS: Stable bilateral renal cysts.  ABDOMINOPELVIC NODES: Overall unchanged extensive retroperitoneal   adenopathy, measuring up to 6.8 x 3.5 cm at the level of the renal   arteries.  PELVIC ORGANS: Unremarkable.  PERITONEUM/MESENTERY/BOWEL: Increased moderate to large volume   abdominopelvic ascites. No evidence of bowel obstruction. No free   intraperitoneal air.  BONES/SOFT TISSUES: Degenerative changes of the spine. No suspicious   osseous lesion.  OTHER: Atherosclerotic vascular calcification. 73y old Male with metastatic neuroendocrine GB tumor on chemo w ascites s/p paracentesis 10/12, b/l tibial DVT on hep drip, & RANULFO on CKD. Pt followed by renal, noted to be oligoanuric s/p [place] 10/17 for RRT. Pt had Godinez in place but noted to have little to no UO last night by house staff. A bedside bladder scan was done and showed volume 700cc but pt has significant ascites. The Godinez was then removed by house staff and pt was straight cath. However, resistance was met and eddie blood was noted in cath and no urine drained. At this point,  was then called to replace Godinez.     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  BPH (benign prostatic hyperplasia)  PTSD (post-traumatic stress disorder)  Hypertension  Dyslipidemia  CKD (chronic kidney disease), stage III  No significant past surgical history    MEDS: aspirin  chewable 81 milliGRAM(s)  chlorhexidine 4% Liquid 1 Application(s)  ciprofloxacin   IVPB 200 milliGRAM(s)  dextrose 5% 1000 milliLiter(s)  docusate sodium 100 milliGRAM(s)  finasteride 5 milliGRAM(s)  melatonin 3 milliGRAM(s)  metoprolol succinate ER 50 milliGRAM(s)  metroNIDAZOLE  IVPB 500 milliGRAM(s)  morphine  - Injectable 2 milliGRAM(s) PRN  octreotide  Injectable 100 MICROGram(s)  ondansetron Injectable 4 milliGRAM(s) PRN  pantoprazole    Tablet 40 milliGRAM(s)  sevelamer hydrochloride 800 milliGRAM(s)  sodium bicarbonate 1300 milliGRAM(s)  tamsulosin 0.4 milliGRAM(s)  traMADol 25 milliGRAM(s) PRN  traZODone 50 milliGRAM(s)    ALLERGIES: No Known Allergies    VS: T(F): 95.4, Max: 96.8 (10-17 @ 06:32)  HR: 73 (69 - 77)  BP: 123/63 (116/65 - 148/71)  RR: 18  SpO2: 98% (98% - 98%)  GEN: Alert, awake, NAD  ABD/:     Attempted to place 18/16F coude & 16F silicone catheter under sterile technique however unable to advance, ?bladder neck contracture. Pt tolerated well.    LABS/IMAGING:    I&O's Detail  16 Oct 2018 07:01  -  17 Oct 2018 07:00  --------------------------------------------------------  OUT:    Indwelling Catheter - Urethral: 125 mL    17 Oct 2018 07:01  -  18 Oct 2018 05:42  --------------------------------------------------------  OUT:    Indwelling Catheter - Urethral: 50 mL               9.1    19.61 )-----------( 189      ( 16 Oct 2018 09:46 )             26.7     10-16    139  |  95<L>  |  111<HH>  ----------------------------<  88  5.2<H>   |  14<L>  |  5.6<HH>    Creatinine, Serum: 4.4: Icteric. Interpret with caution  Critical value: mg/dL (10.15.18 @ 07:37)  Creatinine, Serum: 3.7 mg/dL (10.12.18 @ 07:41)  Creatinine, Serum: 3.1 mg/dL (10.10.18 @ 12:02)  Creatinine, Serum: 1.7 mg/dL (09.29.18 @ 08:02)    TPro  6.1  /  Alb  3.6  /  TBili  8.8<H>  /  DBili  x   /  AST  431<H>  /  ALT  167<H>  /  AlkPhos  494<H>  10-16    PT/INR - ( 17 Oct 2018 18:00 )   PT: 34.30 sec;   INR: 3.02 ratio  PTT - ( 17 Oct 2018 18:00 )  PTT:57.3 sec    < from: CT Abdomen and Pelvis No Cont (10.10.18 @ 17:46) >  LOWER CHEST: Mild bibasilar atelectasis.   HEPATOBILIARY: Numerous overall unchanged ill-defined bilobar hepatic   metastases, poorly evaluated without intravenous contrast.  KIDNEYS: Stable bilateral renal cysts.  ABDOMINOPELVIC NODES: Overall unchanged extensive retroperitoneal   adenopathy, measuring up to 6.8 x 3.5 cm at the level of the renal   arteries.  PELVIC ORGANS: Unremarkable.  PERITONEUM/MESENTERY/BOWEL: Increased moderate to large volume   abdominopelvic ascites. No evidence of bowel obstruction. No free   intraperitoneal air.  BONES/SOFT TISSUES: Degenerative changes of the spine. No suspicious   osseous lesion.  OTHER: Atherosclerotic vascular calcification. 73y old Male with metastatic neuroendocrine GB tumor on chemo w ascites s/p paracentesis 10/12, b/l tibial DVT on hep drip, & RANULFO on CKD. Pt followed by renal, noted to be oligoanuric s/p [place] 10/17 for RRT. Pt had Godinez in place but noted to have little to no UO last night by house staff. A bedside bladder scan was done and showed volume 700cc but pt has significant ascites. The Godinez was then removed by house staff and pt was straight cath. However, resistance was met and eddie blood was noted in cath and no urine drained. At this point,  was then called to replace Godinez.    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  BPH (benign prostatic hyperplasia)  PTSD (post-traumatic stress disorder)  Hypertension  Dyslipidemia  CKD (chronic kidney disease), stage III  No significant past surgical history    MEDS: aspirin  chewable 81 milliGRAM(s)  chlorhexidine 4% Liquid 1 Application(s)  ciprofloxacin   IVPB 200 milliGRAM(s)  dextrose 5% 1000 milliLiter(s)  docusate sodium 100 milliGRAM(s)  finasteride 5 milliGRAM(s)  melatonin 3 milliGRAM(s)  metoprolol succinate ER 50 milliGRAM(s)  metroNIDAZOLE  IVPB 500 milliGRAM(s)  morphine  - Injectable 2 milliGRAM(s) PRN  octreotide  Injectable 100 MICROGram(s)  ondansetron Injectable 4 milliGRAM(s) PRN  pantoprazole    Tablet 40 milliGRAM(s)  sevelamer hydrochloride 800 milliGRAM(s)  sodium bicarbonate 1300 milliGRAM(s)  tamsulosin 0.4 milliGRAM(s)  traMADol 25 milliGRAM(s) PRN  traZODone 50 milliGRAM(s)    ALLERGIES: No Known Allergies    VS: T(F): 95.4, Max: 96.8 (10-17 @ 06:32)  HR: 73 (69 - 77)  BP: 123/63 (116/65 - 148/71)  RR: 18  SpO2: 98% (98% - 98%)  GEN: Resting comfortably in NAD  ABD/: +firm ascites throughout, unable to palpable bladder, uncirc male, testes desc x2 without palpable mass    Attempted to place 18/16F coude & 16F silicone catheter under sterile technique however unable to advance, ?bladder neck contracture. Pt tolerated well.    LABS/IMAGING:    I&O's Detail  16 Oct 2018 07:01  -  17 Oct 2018 07:00  --------------------------------------------------------  OUT:    Indwelling Catheter - Urethral: 125 mL    17 Oct 2018 07:01  -  18 Oct 2018 05:42  --------------------------------------------------------  OUT:    Indwelling Catheter - Urethral: 50 mL               9.1    19.61 )-----------( 189      ( 16 Oct 2018 09:46 )             26.7     10-16    139  |  95<L>  |  111<HH>  ----------------------------<  88  5.2<H>   |  14<L>  |  5.6<HH>    Creatinine, Serum: 4.4: Icteric. Interpret with caution  Critical value: mg/dL (10.15.18 @ 07:37)  Creatinine, Serum: 3.7 mg/dL (10.12.18 @ 07:41)  Creatinine, Serum: 3.1 mg/dL (10.10.18 @ 12:02)  Creatinine, Serum: 1.7 mg/dL (09.29.18 @ 08:02)    TPro  6.1  /  Alb  3.6  /  TBili  8.8<H>  /  DBili  x   /  AST  431<H>  /  ALT  167<H>  /  AlkPhos  494<H>  10-16    PT/INR - ( 17 Oct 2018 18:00 )   PT: 34.30 sec;   INR: 3.02 ratio  PTT - ( 17 Oct 2018 18:00 )  PTT:57.3 sec    < from: CT Abdomen and Pelvis No Cont (10.10.18 @ 17:46) >  LOWER CHEST: Mild bibasilar atelectasis.   HEPATOBILIARY: Numerous overall unchanged ill-defined bilobar hepatic   metastases, poorly evaluated without intravenous contrast.  KIDNEYS: Stable bilateral renal cysts.  ABDOMINOPELVIC NODES: Overall unchanged extensive retroperitoneal   adenopathy, measuring up to 6.8 x 3.5 cm at the level of the renal   arteries.  PELVIC ORGANS: Unremarkable.  PERITONEUM/MESENTERY/BOWEL: Increased moderate to large volume   abdominopelvic ascites. No evidence of bowel obstruction. No free   intraperitoneal air.  BONES/SOFT TISSUES: Degenerative changes of the spine. No suspicious   osseous lesion.  OTHER: Atherosclerotic vascular calcification. 73y old Male with metastatic neuroendocrine GB tumor on chemo w ascites s/p paracentesis 10/12, b/l tibial DVT on hep drip, & RANULFO on CKD. Pt followed by renal, noted to be oligoanuric s/p [place] 10/17 for RRT. Pt had Godinez in place but noted to have little to no UO last night by house staff. A bedside bladder scan was done and showed volume 700cc but pt has significant ascites. The Godinez was then removed by house staff and pt was straight cath. However, resistance was met and eddie blood was noted in cath and no urine drained. At this point,  was then called to replace Godinez.    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  BPH (benign prostatic hyperplasia)  PTSD (post-traumatic stress disorder)  Hypertension  Dyslipidemia  CKD (chronic kidney disease), stage III  No significant past surgical history    MEDS: aspirin  chewable 81 milliGRAM(s)  chlorhexidine 4% Liquid 1 Application(s)  ciprofloxacin   IVPB 200 milliGRAM(s)  dextrose 5% 1000 milliLiter(s)  docusate sodium 100 milliGRAM(s)  finasteride 5 milliGRAM(s)  melatonin 3 milliGRAM(s)  metoprolol succinate ER 50 milliGRAM(s)  metroNIDAZOLE  IVPB 500 milliGRAM(s)  morphine  - Injectable 2 milliGRAM(s) PRN  octreotide  Injectable 100 MICROGram(s)  ondansetron Injectable 4 milliGRAM(s) PRN  pantoprazole    Tablet 40 milliGRAM(s)  sevelamer hydrochloride 800 milliGRAM(s)  sodium bicarbonate 1300 milliGRAM(s)  tamsulosin 0.4 milliGRAM(s)  traMADol 25 milliGRAM(s) PRN  traZODone 50 milliGRAM(s)    ALLERGIES: No Known Allergies    VS: T(F): 95.4, Max: 96.8 (10-17 @ 06:32)  HR: 73 (69 - 77)  BP: 123/63 (116/65 - 148/71)  RR: 18  SpO2: 98% (98% - 98%)  GEN: Resting comfortably in NAD  ABD/: +firm ascites throughout, unable to palpable bladder, uncirc male, testes desc x2 without palpable mass    Attempted to place 18/16F coude & 16F silicone catheter under sterile technique however unable to advance, ?bladder neck contracture. Pt tolerated well. Later urinated small amt of blood tinged urine with small blood clot.    LABS/IMAGING:    I&O's Detail  16 Oct 2018 07:01  -  17 Oct 2018 07:00  --------------------------------------------------------  OUT:    Indwelling Catheter - Urethral: 125 mL    17 Oct 2018 07:01  -  18 Oct 2018 05:42  --------------------------------------------------------  OUT:    Indwelling Catheter - Urethral: 50 mL               9.1    19.61 )-----------( 189      ( 16 Oct 2018 09:46 )             26.7     10-16    139  |  95<L>  |  111<HH>  ----------------------------<  88  5.2<H>   |  14<L>  |  5.6<HH>    Creatinine, Serum: 4.4: Icteric. Interpret with caution  Critical value: mg/dL (10.15.18 @ 07:37)  Creatinine, Serum: 3.7 mg/dL (10.12.18 @ 07:41)  Creatinine, Serum: 3.1 mg/dL (10.10.18 @ 12:02)  Creatinine, Serum: 1.7 mg/dL (09.29.18 @ 08:02)    TPro  6.1  /  Alb  3.6  /  TBili  8.8<H>  /  DBili  x   /  AST  431<H>  /  ALT  167<H>  /  AlkPhos  494<H>  10-16    PT/INR - ( 17 Oct 2018 18:00 )   PT: 34.30 sec;   INR: 3.02 ratio  PTT - ( 17 Oct 2018 18:00 )  PTT:57.3 sec    < from: CT Abdomen and Pelvis No Cont (10.10.18 @ 17:46) >  LOWER CHEST: Mild bibasilar atelectasis.   HEPATOBILIARY: Numerous overall unchanged ill-defined bilobar hepatic   metastases, poorly evaluated without intravenous contrast.  KIDNEYS: Stable bilateral renal cysts.  ABDOMINOPELVIC NODES: Overall unchanged extensive retroperitoneal   adenopathy, measuring up to 6.8 x 3.5 cm at the level of the renal   arteries.  PELVIC ORGANS: Unremarkable.  PERITONEUM/MESENTERY/BOWEL: Increased moderate to large volume   abdominopelvic ascites. No evidence of bowel obstruction. No free   intraperitoneal air.  BONES/SOFT TISSUES: Degenerative changes of the spine. No suspicious   osseous lesion.  OTHER: Atherosclerotic vascular calcification.

## 2018-10-18 NOTE — PROGRESS NOTE ADULT - SUBJECTIVE AND OBJECTIVE BOX
ARACELIS RODRIGUEZ 73y Male  MRN#: [id number]     SUBJECTIVE  Patient is a 73y old Male who presents with a chief complaint of Abdominal Distention (18 Oct 2018 08:34)  Currently admitted to medicine with the primary diagnosis of Malignant ascites    Hospital course has been complicated by acute renal failure with anion gap metabolic acidosis    Today is hospital day 8d, and this morning he is still in significant discomfort due to abdominal distention. Denies/vomiting, lethargic.    Overnight, there was reported blood in Godinez bag with some clots and bladder scan done showing >700cc fluid although this was inaccurate due to ascites. Kidney ultrasound again could not visualized bladder well due to ascites. Urology evaluated patient overnight, removed Godinez, were unable to replace, condom catheter for now to monitor I/O.     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  BPH (benign prostatic hyperplasia)  PTSD (post-traumatic stress disorder)  Hypertension  Dyslipidemia  CKD (chronic kidney disease), stage III  No significant past surgical history    ALLERGIES:  No Known Allergies    MEDICATIONS:  STANDING MEDICATIONS  aspirin  chewable 81 milliGRAM(s) Oral daily  chlorhexidine 4% Liquid 1 Application(s) Topical <User Schedule>  ciprofloxacin   IVPB 200 milliGRAM(s) IV Intermittent every 24 hours  dextrose 5% 1000 milliLiter(s) IV Continuous <Continuous>  docusate sodium 100 milliGRAM(s) Oral three times a day  finasteride 5 milliGRAM(s) Oral daily  melatonin 3 milliGRAM(s) Oral at bedtime  metoprolol succinate ER 50 milliGRAM(s) Oral daily  metroNIDAZOLE  IVPB 500 milliGRAM(s) IV Intermittent every 8 hours  octreotide  Injectable 100 MICROGram(s) SubCutaneous two times a day  pantoprazole    Tablet 40 milliGRAM(s) Oral before breakfast  phytonadione  IVPB 10 milliGRAM(s) IV Intermittent once  senna 1 Tablet(s) Oral daily  senna 1 Tablet(s) Oral at bedtime  sevelamer hydrochloride 1600 milliGRAM(s) Oral three times a day with meals  tamsulosin 0.4 milliGRAM(s) Oral at bedtime  traZODone 50 milliGRAM(s) Oral daily    PRN MEDICATIONS  morphine  - Injectable 2 milliGRAM(s) IV Push every 6 hours PRN  ondansetron Injectable 4 milliGRAM(s) IV Push every 6 hours PRN  traMADol 25 milliGRAM(s) Oral every 8 hours PRN      VITAL SIGNS: Last 24 Hours  T(C): 36.1 (18 Oct 2018 05:57), Max: 36.1 (18 Oct 2018 05:57)  T(F): 96.9 (18 Oct 2018 05:57), Max: 96.9 (18 Oct 2018 05:57)  HR: 74 (18 Oct 2018 05:57) (69 - 77)  BP: 136/64 (18 Oct 2018 05:57) (116/65 - 148/71)  BP(mean): --  RR: 18 (18 Oct 2018 05:57) (16 - 18)  SpO2: 98% (17 Oct 2018 19:32) (98% - 98%)    LABS:                        9.1    19.61 )-----------( 189      ( 16 Oct 2018 09:46 )             26.7     10-16    139  |  95<L>  |  111<HH>  ----------------------------<  88  5.2<H>   |  14<L>  |  5.6<HH>    Ca    8.8      16 Oct 2018 09:46  Phos  6.6     10-16  Mg     2.4     10-16    TPro  6.1  /  Alb  3.6  /  TBili  8.8<H>  /  DBili  x   /  AST  431<H>  /  ALT  167<H>  /  AlkPhos  494<H>  10-16    PT/INR - ( 17 Oct 2018 18:00 )   PT: 34.30 sec;   INR: 3.02 ratio         PTT - ( 17 Oct 2018 18:00 )  PTT:57.3 sec              RADIOLOGY:      PHYSICAL EXAM:  GENERAL: Well-developed. Lethargic   HEENT: Atraumatic, Normocephalic. EOMI, conjunctiva and sclera clear  PULMONARY: Decreased breath sounds at bases. Clear to auscultation bilaterally, no rales appreciated  CARDIOVASCULAR: Regular rate and rhythm; No murmurs, rubs, or gallops  GASTROINTESTINAL: Significant distention. Mild tenderness to palpation lower abdomen, no rebound, no guarding; Bowel sounds present. (+) fluid wave  MUSCULOSKELETAL:  2+ Peripheral Pulses, No clubbing, no edema bilateral LE. No tenderness to palpation.  NEUROLOGY: non-focal ARACELIS RODRIGUEZ 73y Male  MRN#: [id number]     SUBJECTIVE  Patient is a 73y old Male who presents with a chief complaint of Abdominal Distention (18 Oct 2018 08:34)  Currently admitted to medicine with the primary diagnosis of Malignant ascites    Hospital course has been complicated by acute renal failure with anion gap metabolic acidosis    Today is hospital day 8d, and this morning he is still in significant discomfort due to abdominal distention. Denies/vomiting, lethargic.    Overnight, there was reported blood in Godinez bag with some clots and bladder scan done showing >700cc fluid although this was inaccurate due to ascites. Kidney ultrasound again could not visualized bladder well due to ascites. Urology evaluated patient overnight, removed Godinez, were unable to replace, condom catheter for now to monitor I/O.     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  BPH (benign prostatic hyperplasia)  PTSD (post-traumatic stress disorder)  Hypertension  Dyslipidemia  CKD (chronic kidney disease), stage III  No significant past surgical history    ALLERGIES:  No Known Allergies    MEDICATIONS:  STANDING MEDICATIONS  aspirin  chewable 81 milliGRAM(s) Oral daily  chlorhexidine 4% Liquid 1 Application(s) Topical <User Schedule>  ciprofloxacin   IVPB 200 milliGRAM(s) IV Intermittent every 24 hours  dextrose 5% 1000 milliLiter(s) IV Continuous <Continuous>  docusate sodium 100 milliGRAM(s) Oral three times a day  finasteride 5 milliGRAM(s) Oral daily  melatonin 3 milliGRAM(s) Oral at bedtime  metoprolol succinate ER 50 milliGRAM(s) Oral daily  metroNIDAZOLE  IVPB 500 milliGRAM(s) IV Intermittent every 8 hours  octreotide  Injectable 100 MICROGram(s) SubCutaneous two times a day  pantoprazole    Tablet 40 milliGRAM(s) Oral before breakfast  phytonadione  IVPB 10 milliGRAM(s) IV Intermittent once  senna 1 Tablet(s) Oral daily  senna 1 Tablet(s) Oral at bedtime  sevelamer hydrochloride 1600 milliGRAM(s) Oral three times a day with meals  tamsulosin 0.4 milliGRAM(s) Oral at bedtime  traZODone 50 milliGRAM(s) Oral daily    PRN MEDICATIONS  morphine  - Injectable 2 milliGRAM(s) IV Push every 6 hours PRN  ondansetron Injectable 4 milliGRAM(s) IV Push every 6 hours PRN  traMADol 25 milliGRAM(s) Oral every 8 hours PRN      VITAL SIGNS: Last 24 Hours  T(C): 36.1 (18 Oct 2018 05:57), Max: 36.1 (18 Oct 2018 05:57)  T(F): 96.9 (18 Oct 2018 05:57), Max: 96.9 (18 Oct 2018 05:57)  HR: 74 (18 Oct 2018 05:57) (69 - 77)  BP: 136/64 (18 Oct 2018 05:57) (116/65 - 148/71)  BP(mean): --  RR: 18 (18 Oct 2018 05:57) (16 - 18)  SpO2: 98% (17 Oct 2018 19:32) (98% - 98%)    LABS:                        9.1    19.61 )-----------( 189      ( 16 Oct 2018 09:46 )             26.7     10-16    139  |  95<L>  |  111<HH>  ----------------------------<  88  5.2<H>   |  14<L>  |  5.6<HH>    Ca    8.8      16 Oct 2018 09:46  Phos  6.6     10-16  Mg     2.4     10-16    TPro  6.1  /  Alb  3.6  /  TBili  8.8<H>  /  DBili  x   /  AST  431<H>  /  ALT  167<H>  /  AlkPhos  494<H>  10-16    PT/INR - ( 17 Oct 2018 18:00 )   PT: 34.30 sec;   INR: 3.02 ratio         PTT - ( 17 Oct 2018 18:00 )  PTT:57.3 sec              RADIOLOGY:      PHYSICAL EXAM:  GENERAL: Well-developed. Lethargic   HEENT: Atraumatic, Normocephalic. EOMI, conjunctiva and sclera icteric  PULMONARY: Decreased breath sounds at bases. Clear to auscultation bilaterally, no rales appreciated  CARDIOVASCULAR: Regular rate and rhythm; No murmurs, rubs, or gallops  GASTROINTESTINAL: Significant distention. Mild tenderness to palpation lower abdomen, no rebound, no guarding; Bowel sounds present. (+) fluid wave  MUSCULOSKELETAL:  2+ Peripheral Pulses, No clubbing, no edema bilateral LE. No tenderness to palpation.  NEUROLOGY: non-focal

## 2018-10-18 NOTE — CONSULT NOTE ADULT - REASON FOR ADMISSION
Abdominal Distention
Abdominal Distention/ Neuroendocrine tumor (+) gallbladder ca

## 2018-10-18 NOTE — PROGRESS NOTE ADULT - SUBJECTIVE AND OBJECTIVE BOX
73y old Male with PMH of  neuroendocrine tumor with metastatic disease, severe retroperitoneal lymphoadenopathy, RANULFO p/w  abdominal ascites ?urinary retention, traumatic Godinez, failed Godinez catheter placement-- ?bladder neck contracture. Pt. seen and examined at bedside HD , looks slightly confused, lethargic but answering questions, denies abdominal pain. Spoke with medical resident Pt. is going for abdominal tap with IR.      Vital Signs Last 24 Hrs  T(C): 36.1 (18 Oct 2018 05:57), Max: 36.1 (18 Oct 2018 05:57)  T(F): 96.9 (18 Oct 2018 05:57), Max: 96.9 (18 Oct 2018 05:57)  HR: 75 (18 Oct 2018 08:45) (69 - 77)  BP: 139/73 (18 Oct 2018 08:45) (116/65 - 148/71)  RR: 18 (18 Oct 2018 08:45) (16 - 18)  SpO2: 98% (17 Oct 2018 19:32) (98% - 98%)    PE:  General: slightly confused, lethargic.  HEENT: NC/AT, EOMI]  Abd: Very distended 2/2 abdominal ascites, no suprapubic tenderness to palpation   : uncircumcised male genitalia, + blood over glans, B/L scrotum no TTP B/L testis descended no TTP  Extremities: + edema      I&O's Detail    17 Oct 2018 07:01  -  18 Oct 2018 07:00  --------------------------------------------------------  IN:  Total IN: 0 mL    OUT:    Indwelling Catheter - Urethral: 50 mL    Other: 900 mL  Total OUT: 950 mL    Total NET: -950 mL        LABS:                        8.3    21.97 )-----------( 62       ( 18 Oct 2018 08:36 )             24.4     10-18    139  |  90<L>  |  103<HH>  ----------------------------<  64<L>  5.2<H>   |  14<L>  |  6.1<HH>    Ca    9.0      18 Oct 2018 08:36  Phos  7.6     10-18  Mg     2.3     10-18    TPro  5.6<L>  /  Alb  3.2<L>  /  TBili  11.0<H>  /  DBili  8.1<H>  /  AST  735<H>  /  ALT  206<H>  /  AlkPhos  418<H>  10-18      RADIOLOGY & ADDITIONAL STUDIES:  < from: US Kidney and Bladder (10.18.18 @ 04:44) >  EXAM:  US KIDNEYS AND BLADDER            PROCEDURE DATE:  10/18/2018            INTERPRETATION:  CLINICAL STATEMENT: Blood clots in the urine and   retention.    COMPARISON: CT of the abdomen and pelvis on 10/10/2018.    PROCEDURE: Retroperitoneal ultrasound was performed.    FINDINGS:    RIGHT KIDNEY:  Measures 10.3 cm.  No hydronephrosis.  Right renal cysts   measuring up to 3.5 x 3.3 cm. No solid mass, calculus or perinephric   fluid collection. Vascular flow is demonstrated.     LEFT KIDNEY:   Measures 9.9 cm.  No hydronephrosis. Left interpolar   region cyst measures 1.3 x 1.2 cm. No solid mass, calculus or perinephric   fluid collection. Vascular flow is demonstrated.     URINARY BLADDER: Poorly visualized due to surrounding ascites. There is   debris within the urinary bladder. Ureteral jets could not be well   visualized.     OTHER: Moderate abdominopelvic ascites.    IMPRESSION:  1.  Poorly visualized urinary bladder due to surrounding ascites. Debris   within the urinary bladder.    < end of copied text >

## 2018-10-18 NOTE — PROGRESS NOTE ADULT - ASSESSMENT
ADMISSION SUMMARY  72 y/o M with PMH of HTN, BPH, PTSD, DLD, CKD 3 (40), 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     ASSESSMENT & PLAN  # Ascites s/p diagnostic/therapeutic paracentesis (10/12) .- worsened.   -Possibly malignant, hx of heavy alcohol use, neuroendocrine tumor with mets to liver  -Initial CT abd/pel (10/10)- moderate to large volume ascites. S/p paracentesis by IR on (10/12) with 4L of madyson color fluid successfully drained, 120cc of fluid removed and sent for culture and cytology, but lab unable to receive   -Repeat U/S (10/16) with findings as above spoke with IR, will do paracentesis today/tomorrow  -Needs repeat tap today with IR - will give Vitamin K 10 mg and Kcentra for elevated INR    # RANULFO on CKD III - worsening with high anion gap metabolic acidosis on RRT  -Likely hepatorenal syndrome vs. medical compartment syndrome given ascites. CT abd/pel (10/10) showed no signs of hydronephrosis  -Cr worsening 5.6 (10/16) with bicarb 14 (10/16)  -Nephro following - RRT initiated yesterday through [facility] [place], HD again this AM  -S/p 4 days of Albumin, continue octreotide 100 mcg BID, increased Renagel 2 tab q8h  -Oligoanuric past few days - Godinez removed, repeat U/S (10/18) showed no hydronephrosis but unable to assess bladder due to ascites  -Repeat U/S kidney/bladder after tap  -Continue D5W with 150 mEq bicarb @ 60 cc/hr for bicarb   -F/u repeat lytes from HD    # Acute bilateral posterior tibial DVT  -Hep drip @ 1000 units previous therapeutic PTT  -Held for [place]/paracentesis  -Now supratherapeutic INR due to liver disease anyway    # Hypertension - controlled  -Continue metoprolol succinate ER 50 mg PO daily, hold if SBP <100   -Nifedipine d/c'ed earlier given hypotension - continue to hold    # Hyperbilirubinemia, Transaminitis - worsening  -GI following - will resend paracentesis fluid for analysis following tap, f/u liver disease workup requested by GI   -TEDDY positive and iron studies abnormal  -F/u repeat labs today    # Constipation - resolved  -Continue bowel regimen Senna, Colace    # DLD  -Hold Lipitor 10 mg qHS for transaminitis    # Hx of BPH - stable  -Continue Finasteride 5 mg PO daily, Tamsulosin 0.4 mg PO qHS    # Hx of Insomnia  -Melatonin 3 mg PO qHS  -Trazadone 50 mg PO daily    # DVT Prophylaxis  -Coagulopathic from liver disease    # GI Prophylaxis  -Protonix 40 mg PO daily    # Disposition  -Code status: as per discussion with wife and family, FULL CODE as of now. Aware cancer is metastasized even on chemo and no further treatment is recommended by oncologist, but would like to see if he improves/symptomatically feels better with dialysis and paracentesis - aware improvement would be temporary, if unimproved will likely make hospice/comfort  -As per palliative care, patient is more of a candidate for hospice. Wife and family open to speaking with hospice to discuss options    Overall prognosis poor.

## 2018-10-19 VITALS
RESPIRATION RATE: 18 BRPM | HEART RATE: 66 BPM | DIASTOLIC BLOOD PRESSURE: 46 MMHG | SYSTOLIC BLOOD PRESSURE: 90 MMHG | TEMPERATURE: 96 F

## 2018-10-19 LAB
ALBUMIN SERPL ELPH-MCNC: 3.4 G/DL — LOW (ref 3.5–5.2)
ALP SERPL-CCNC: 415 U/L — HIGH (ref 30–115)
ALT FLD-CCNC: 308 U/L — HIGH (ref 0–41)
ANION GAP SERPL CALC-SCNC: 39 MMOL/L — HIGH (ref 7–14)
AST SERPL-CCNC: 1360 U/L — HIGH (ref 0–41)
BASOPHILS # BLD AUTO: 0.02 K/UL — SIGNIFICANT CHANGE UP (ref 0–0.2)
BASOPHILS NFR BLD AUTO: 0.1 % — SIGNIFICANT CHANGE UP (ref 0–1)
BILIRUB DIRECT SERPL-MCNC: 8.4 MG/DL — HIGH (ref 0–0.2)
BILIRUB INDIRECT FLD-MCNC: 4.9 MG/DL — HIGH (ref 0.2–1.2)
BILIRUB SERPL-MCNC: 13.3 MG/DL — HIGH (ref 0.2–1.2)
BUN SERPL-MCNC: 76 MG/DL — CRITICAL HIGH (ref 10–20)
CALCIUM SERPL-MCNC: 9 MG/DL — SIGNIFICANT CHANGE UP (ref 8.5–10.1)
CHLORIDE SERPL-SCNC: 88 MMOL/L — LOW (ref 98–110)
CO2 SERPL-SCNC: 13 MMOL/L — LOW (ref 17–32)
CREAT SERPL-MCNC: 5.4 MG/DL — CRITICAL HIGH (ref 0.7–1.5)
EOSINOPHIL # BLD AUTO: 0 K/UL — SIGNIFICANT CHANGE UP (ref 0–0.7)
EOSINOPHIL NFR BLD AUTO: 0 % — SIGNIFICANT CHANGE UP (ref 0–8)
GLUCOSE BLDC GLUCOMTR-MCNC: 109 MG/DL — HIGH (ref 70–99)
GLUCOSE BLDC GLUCOMTR-MCNC: 167 MG/DL — HIGH (ref 70–99)
GLUCOSE BLDC GLUCOMTR-MCNC: 46 MG/DL — LOW (ref 70–99)
GLUCOSE SERPL-MCNC: 58 MG/DL — LOW (ref 70–99)
HCT VFR BLD CALC: 25.2 % — LOW (ref 42–52)
HGB BLD-MCNC: 8.3 G/DL — LOW (ref 14–18)
IMM GRANULOCYTES NFR BLD AUTO: 2.2 % — HIGH (ref 0.1–0.3)
LYMPHOCYTES # BLD AUTO: 0.57 K/UL — LOW (ref 1.2–3.4)
LYMPHOCYTES # BLD AUTO: 2.2 % — LOW (ref 20.5–51.1)
MAGNESIUM SERPL-MCNC: 2.4 MG/DL — SIGNIFICANT CHANGE UP (ref 1.8–2.4)
MCHC RBC-ENTMCNC: 26.5 PG — LOW (ref 27–31)
MCHC RBC-ENTMCNC: 32.9 G/DL — SIGNIFICANT CHANGE UP (ref 32–37)
MCV RBC AUTO: 80.5 FL — SIGNIFICANT CHANGE UP (ref 80–94)
MONOCYTES # BLD AUTO: 2.92 K/UL — HIGH (ref 0.1–0.6)
MONOCYTES NFR BLD AUTO: 11.5 % — HIGH (ref 1.7–9.3)
NEUTROPHILS # BLD AUTO: 21.42 K/UL — HIGH (ref 1.4–6.5)
NEUTROPHILS NFR BLD AUTO: 84 % — HIGH (ref 42.2–75.2)
NRBC # BLD: 0 /100 WBCS — SIGNIFICANT CHANGE UP (ref 0–0)
PHOSPHATE SERPL-MCNC: 8.7 MG/DL — HIGH (ref 2.1–4.9)
PLATELET # BLD AUTO: 69 K/UL — LOW (ref 130–400)
POTASSIUM SERPL-MCNC: 5.9 MMOL/L — HIGH (ref 3.5–5)
POTASSIUM SERPL-SCNC: 5.9 MMOL/L — HIGH (ref 3.5–5)
PROT SERPL-MCNC: 5.8 G/DL — LOW (ref 6–8)
RBC # BLD: 3.13 M/UL — LOW (ref 4.7–6.1)
RBC # FLD: 26.5 % — HIGH (ref 11.5–14.5)
SODIUM SERPL-SCNC: 140 MMOL/L — SIGNIFICANT CHANGE UP (ref 135–146)
WBC # BLD: 25.48 K/UL — HIGH (ref 4.8–10.8)
WBC # FLD AUTO: 25.48 K/UL — HIGH (ref 4.8–10.8)

## 2018-10-19 PROCEDURE — 99232 SBSQ HOSP IP/OBS MODERATE 35: CPT

## 2018-10-19 RX ORDER — INSULIN HUMAN 100 [IU]/ML
10 INJECTION, SOLUTION SUBCUTANEOUS ONCE
Qty: 0 | Refills: 0 | Status: COMPLETED | OUTPATIENT
Start: 2018-10-19 | End: 2018-10-19

## 2018-10-19 RX ORDER — DEXTROSE 50 % IN WATER 50 %
100 SYRINGE (ML) INTRAVENOUS ONCE
Qty: 0 | Refills: 0 | Status: COMPLETED | OUTPATIENT
Start: 2018-10-19 | End: 2018-10-19

## 2018-10-19 RX ORDER — ALBUMIN HUMAN 25 %
50 VIAL (ML) INTRAVENOUS ONCE
Qty: 0 | Refills: 0 | Status: DISCONTINUED | OUTPATIENT
Start: 2018-10-19 | End: 2018-10-19

## 2018-10-19 RX ADMIN — PANTOPRAZOLE SODIUM 40 MILLIGRAM(S): 20 TABLET, DELAYED RELEASE ORAL at 06:24

## 2018-10-19 RX ADMIN — HYDROMORPHONE HYDROCHLORIDE 0.5 MILLIGRAM(S): 2 INJECTION INTRAMUSCULAR; INTRAVENOUS; SUBCUTANEOUS at 14:35

## 2018-10-19 RX ADMIN — Medication 100 MILLIGRAM(S): at 06:24

## 2018-10-19 RX ADMIN — HYDROMORPHONE HYDROCHLORIDE 0.5 MILLIGRAM(S): 2 INJECTION INTRAMUSCULAR; INTRAVENOUS; SUBCUTANEOUS at 21:42

## 2018-10-19 RX ADMIN — CHLORHEXIDINE GLUCONATE 1 APPLICATION(S): 213 SOLUTION TOPICAL at 06:25

## 2018-10-19 RX ADMIN — Medication 100 MILLILITER(S): at 09:39

## 2018-10-19 RX ADMIN — HYDROMORPHONE HYDROCHLORIDE 0.5 MILLIGRAM(S): 2 INJECTION INTRAMUSCULAR; INTRAVENOUS; SUBCUTANEOUS at 14:50

## 2018-10-19 RX ADMIN — HYDROMORPHONE HYDROCHLORIDE 0.5 MILLIGRAM(S): 2 INJECTION INTRAMUSCULAR; INTRAVENOUS; SUBCUTANEOUS at 23:55

## 2018-10-19 RX ADMIN — Medication 50 MILLIGRAM(S): at 06:24

## 2018-10-19 RX ADMIN — HYDROMORPHONE HYDROCHLORIDE 0.5 MILLIGRAM(S): 2 INJECTION INTRAMUSCULAR; INTRAVENOUS; SUBCUTANEOUS at 21:22

## 2018-10-19 RX ADMIN — OCTREOTIDE ACETATE 100 MICROGRAM(S): 200 INJECTION, SOLUTION INTRAVENOUS; SUBCUTANEOUS at 06:24

## 2018-10-19 RX ADMIN — Medication 100 MILLIGRAM(S): at 14:21

## 2018-10-19 RX ADMIN — INSULIN HUMAN 10 UNIT(S): 100 INJECTION, SOLUTION SUBCUTANEOUS at 09:40

## 2018-10-19 NOTE — PROGRESS NOTE ADULT - SUBJECTIVE AND OBJECTIVE BOX
73yMale with diagnosis: MALIGNANT ASCITES RANULFO ACUTE KIDNEY INJURY      Patient seen, awake and NAD     PHYSICAL EXAM    T(C): , Max: 36.9 (21:29)  T(F): 95.2  HR: 63 (63 - 68)  BP: 90/54 (90/54 - 104/54)  RR: 16 (16 - 18)  SpO2: 100% (100% - 100%)                                    8.3    25.48 )-----------( 69       ( 19 Oct 2018 06:47 )             25.2                                                                                      10-19    140  |  88<L>  |  76<HH>  ----------------------------<  58<L>  5.9<H>   |  13<L>  |  5.4<HH>    Ca    9.0      19 Oct 2018 06:47  Phos  8.7     10-19  Mg     2.4     10-19    TPro  5.8<L>  /  Alb  3.4<L>  /  TBili  13.3<H>  /  DBili  8.4<H>  /  AST  1360<H>  /  ALT  308<H>  /  AlkPhos  415<H>  10-19                                                      MEDICATIONS  (STANDING):  albumin human 25% IVPB 50 milliLiter(s) IV Intermittent once  aspirin  chewable 81 milliGRAM(s) Oral daily  chlorhexidine 4% Liquid 1 Application(s) Topical <User Schedule>  ciprofloxacin   IVPB 200 milliGRAM(s) IV Intermittent every 24 hours  dextrose 5% 1000 milliLiter(s) (60 mL/Hr) IV Continuous <Continuous>  docusate sodium 100 milliGRAM(s) Oral three times a day  finasteride 5 milliGRAM(s) Oral daily  melatonin 3 milliGRAM(s) Oral at bedtime  metoprolol succinate ER 50 milliGRAM(s) Oral daily  metroNIDAZOLE  IVPB 500 milliGRAM(s) IV Intermittent every 8 hours  octreotide  Injectable 100 MICROGram(s) SubCutaneous two times a day  pantoprazole    Tablet 40 milliGRAM(s) Oral before breakfast  senna 1 Tablet(s) Oral daily  senna 1 Tablet(s) Oral at bedtime  sevelamer hydrochloride 1600 milliGRAM(s) Oral three times a day with meals  tamsulosin 0.4 milliGRAM(s) Oral at bedtime  traZODone 50 milliGRAM(s) Oral daily    MEDICATIONS  (PRN):  HYDROmorphone  Injectable 0.5 milliGRAM(s) IV Push every 3 hours PRN Moderate Pain (4 - 6)  ondansetron Injectable 4 milliGRAM(s) IV Push every 6 hours PRN Nausea and/or Vomiting 73yMale with diagnosis: MALIGNANT ASCITES RANULFO ACUTE KIDNEY INJURY      Patient seen, awake and NAD     PHYSICAL EXAM  Chronically ill looking male, alert/oriented x2  PERRL  Diminished breath sound b/l  S1S2 RRR,  (-) JVD  Abdominal distended with diffuse tenderness, (+) acites  Godinez  to condom cath        T(C): , Max: 36.9 (21:29)  T(F): 95.2  HR: 63 (63 - 68)  BP: 90/54 (90/54 - 104/54)  RR: 16 (16 - 18)  SpO2: 100% (100% - 100%)                                    8.3    25.48 )-----------( 69       ( 19 Oct 2018 06:47 )             25.2                                                                                      10-19    140  |  88<L>  |  76<HH>  ----------------------------<  58<L>  5.9<H>   |  13<L>  |  5.4<HH>    Ca    9.0      19 Oct 2018 06:47  Phos  8.7     10-19  Mg     2.4     10-19    TPro  5.8<L>  /  Alb  3.4<L>  /  TBili  13.3<H>  /  DBili  8.4<H>  /  AST  1360<H>  /  ALT  308<H>  /  AlkPhos  415<H>  10-19                                                      MEDICATIONS  (STANDING):  albumin human 25% IVPB 50 milliLiter(s) IV Intermittent once  aspirin  chewable 81 milliGRAM(s) Oral daily  chlorhexidine 4% Liquid 1 Application(s) Topical <User Schedule>  ciprofloxacin   IVPB 200 milliGRAM(s) IV Intermittent every 24 hours  dextrose 5% 1000 milliLiter(s) (60 mL/Hr) IV Continuous <Continuous>  docusate sodium 100 milliGRAM(s) Oral three times a day  finasteride 5 milliGRAM(s) Oral daily  melatonin 3 milliGRAM(s) Oral at bedtime  metoprolol succinate ER 50 milliGRAM(s) Oral daily  metroNIDAZOLE  IVPB 500 milliGRAM(s) IV Intermittent every 8 hours  octreotide  Injectable 100 MICROGram(s) SubCutaneous two times a day  pantoprazole    Tablet 40 milliGRAM(s) Oral before breakfast  senna 1 Tablet(s) Oral daily  senna 1 Tablet(s) Oral at bedtime  sevelamer hydrochloride 1600 milliGRAM(s) Oral three times a day with meals  tamsulosin 0.4 milliGRAM(s) Oral at bedtime  traZODone 50 milliGRAM(s) Oral daily    MEDICATIONS  (PRN):  HYDROmorphone  Injectable 0.5 milliGRAM(s) IV Push every 3 hours PRN Moderate Pain (4 - 6)  ondansetron Injectable 4 milliGRAM(s) IV Push every 6 hours PRN Nausea and/or Vomiting

## 2018-10-19 NOTE — PROGRESS NOTE ADULT - SUBJECTIVE AND OBJECTIVE BOX
ARACELIS RODRIGUEZ  73y  Male      Patient is a 73y old  Male who presents with a chief complaint of Abdominal Distention (19 Oct 2018 15:16)      INTERVAL HPI/OVERNIGHT EVENTS:      ******************************* REVIEW OF SYSTEMS:**********************************************      All other review of systems negative    *********************** VITALS ******************************************    T(F): 95.2 (10-19-18 @ 14:51)  HR: 63 (10-19-18 @ 14:51) (63 - 68)  BP: 90/54 (10-19-18 @ 14:51) (90/54 - 104/54)  RR: 16 (10-19-18 @ 14:51) (16 - 18)  SpO2: 100% (10-19-18 @ 07:30) (100% - 100%)    10-18-18 @ 07:01  -  10-19-18 @ 07:00  --------------------------------------------------------  IN: 60 mL / OUT: 1000 mL / NET: -940 mL            10-18-18 @ 07:01  -  10-19-18 @ 07:00  --------------------------------------------------------  IN: 60 mL / OUT: 1000 mL / NET: -940 mL        ******************************** PHYSICAL EXAM:**************************************************  GENERAL: NAD    PSYCH: no agitation, baseline mentation  HEENT:     NERVOUS SYSTEM:  Alert & Oriented X1-2    PULMONARY: LILO, CTA    CARDIOVASCULAR: S1S2 RRR    GI: Soft, NT, distended with ascites.; BS present.    EXTREMITIES:  2+ Peripheral Pulses, No clubbing, cyanosis, or edema    LYMPH: No lymphadenopathy noted    SKIN: No rashes or lesions    ******************************************************************************************    Consultant(s) Notes Reviewed:  [x ] YES  [ ] NO    Discussed with Consultants/Other Providers [ x] YES     **************************** LABS *******************************************************                          8.3    25.48 )-----------( 69       ( 19 Oct 2018 06:47 )             25.2     10-19    140  |  88<L>  |  76<HH>  ----------------------------<  58<L>  5.9<H>   |  13<L>  |  5.4<HH>    Ca    9.0      19 Oct 2018 06:47  Phos  8.7     10-19  Mg     2.4     10-19    TPro  5.8<L>  /  Alb  3.4<L>  /  TBili  13.3<H>  /  DBili  8.4<H>  /  AST  1360<H>  /  ALT  308<H>  /  AlkPhos  415<H>  10-19        PT/INR - ( 18 Oct 2018 14:34 )   PT: 20.10 sec;   INR: 1.76 ratio         PTT - ( 18 Oct 2018 14:34 )  PTT:63.9 sec  Lactate Trend        CAPILLARY BLOOD GLUCOSE      POCT Blood Glucose.: 109 mg/dL (19 Oct 2018 11:57)          **************************Active Medications *******************************************  No Known Allergies      albumin human 25% IVPB 50 milliLiter(s) IV Intermittent once  aspirin  chewable 81 milliGRAM(s) Oral daily  chlorhexidine 4% Liquid 1 Application(s) Topical <User Schedule>  ciprofloxacin   IVPB 200 milliGRAM(s) IV Intermittent every 24 hours  dextrose 5% 1000 milliLiter(s) IV Continuous <Continuous>  docusate sodium 100 milliGRAM(s) Oral three times a day  finasteride 5 milliGRAM(s) Oral daily  HYDROmorphone  Injectable 0.5 milliGRAM(s) IV Push every 3 hours PRN  melatonin 3 milliGRAM(s) Oral at bedtime  metoprolol succinate ER 50 milliGRAM(s) Oral daily  metroNIDAZOLE  IVPB 500 milliGRAM(s) IV Intermittent every 8 hours  octreotide  Injectable 100 MICROGram(s) SubCutaneous two times a day  ondansetron Injectable 4 milliGRAM(s) IV Push every 6 hours PRN  pantoprazole    Tablet 40 milliGRAM(s) Oral before breakfast  senna 1 Tablet(s) Oral daily  senna 1 Tablet(s) Oral at bedtime  sevelamer hydrochloride 1600 milliGRAM(s) Oral three times a day with meals  tamsulosin 0.4 milliGRAM(s) Oral at bedtime  traZODone 50 milliGRAM(s) Oral daily      ***************************************************  RADIOLOGY & ADDITIONAL TESTS:    Imaging Personally Reviewed:  [ ] YES  [ ] NO    HEALTH ISSUES - PROBLEM Dx:

## 2018-10-19 NOTE — PROGRESS NOTE ADULT - ASSESSMENT
73yMale being evaluated for goals of care    Palliative team, resident, Spouse and Sons x2 met today to discussed overall goals of care.           Recommendations: 73yMale being evaluated for goals of care    Palliative team, resident, Spouse and Sons x2 met today to discussed overall goals of care. Clinical condition reviewed, prognosis and disease progress discussed at length. Family verbalized understanding of patient's status, and have decided to defer dialysis and pursue comfort measures henceforth. Family are in agreement to palliative paracentesis           Recommendations: 73yMale being evaluated for goals of care    Palliative team, resident, Spouse and Sons x2 met today to discussed overall goals of care. Clinical condition reviewed, prognosis and disease progress discussed at length. Family verbalized understanding of patient's status, and have collectively decided to defer dialysis, invasive/ aggressive intervention to pursue comfort measures henceforth. Family are in agreement to palliative paracentesis and hospice care at home or SNF. Palliative team will continue to provide supportive and spiritual care.     Case d/w Resident, RN and Attending    Time Spent 30 mins       Recommendations:  Comfort measures only  No further escalation of care - dialysis   DNI/DNR  Hydromorphone PRN  Hospice consult  poor prognosis   we will f/u 73yMale being evaluated for goals of care    Palliative team, resident, Spouse and Sons x2 met today to discussed overall goals of care. Clinical condition reviewed, prognosis and disease progress discussed at length. Family verbalized understanding of patient's status, and have collectively decided to defer dialysis, invasive/ aggressive intervention to pursue comfort measures henceforth. Family are in agreement to palliative paracentesis and hospice care at home or SNF. Palliative team will continue to provide supportive and spiritual care.     Case d/w Resident, RN and Attending  Goals of care documented    Time Spent 30 mins       Recommendations:  Comfort measures only  No further escalation of care - dialysis   DNI/DNR  Hydromorphone PRN  Hospice consult  poor prognosis   we will f/u

## 2018-10-19 NOTE — PROGRESS NOTE ADULT - ASSESSMENT
As above.      Plan:  IR x paracentesis   STAT RBUS for evaluation of kidneys and bladder. If bladder full will need bedside cystoscopy and Godinez catheter placement over wire.   Analgesia prn x pain    attending will F/U.

## 2018-10-19 NOTE — PROGRESS NOTE ADULT - REASON FOR ADMISSION
Abdominal Distention

## 2018-10-19 NOTE — PROGRESS NOTE ADULT - SUBJECTIVE AND OBJECTIVE BOX
ARACELIS RODRIGUEZ 73y Male  MRN#: [id number]     SUBJECTIVE  Patient is a 73y old Male who presents with a chief complaint of Abdominal Distention (19 Oct 2018 15:23)  Currently admitted to medicine with the primary diagnosis of Malignant ascites    Today is hospital day 9d, and this morning he is lethargic, disoriented, uncomfortable appearing. Unable to answer questions appropriately.     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  BPH (benign prostatic hyperplasia)  PTSD (post-traumatic stress disorder)  Hypertension  Dyslipidemia  CKD (chronic kidney disease), stage III  No significant past surgical history    ALLERGIES:  No Known Allergies    MEDICATIONS:  STANDING MEDICATIONS  albumin human 25% IVPB 50 milliLiter(s) IV Intermittent once  aspirin  chewable 81 milliGRAM(s) Oral daily  chlorhexidine 4% Liquid 1 Application(s) Topical <User Schedule>  ciprofloxacin   IVPB 200 milliGRAM(s) IV Intermittent every 24 hours  dextrose 5% 1000 milliLiter(s) IV Continuous <Continuous>  docusate sodium 100 milliGRAM(s) Oral three times a day  finasteride 5 milliGRAM(s) Oral daily  melatonin 3 milliGRAM(s) Oral at bedtime  metoprolol succinate ER 50 milliGRAM(s) Oral daily  metroNIDAZOLE  IVPB 500 milliGRAM(s) IV Intermittent every 8 hours  octreotide  Injectable 100 MICROGram(s) SubCutaneous two times a day  pantoprazole    Tablet 40 milliGRAM(s) Oral before breakfast  senna 1 Tablet(s) Oral daily  senna 1 Tablet(s) Oral at bedtime  sevelamer hydrochloride 1600 milliGRAM(s) Oral three times a day with meals  tamsulosin 0.4 milliGRAM(s) Oral at bedtime  traZODone 50 milliGRAM(s) Oral daily    PRN MEDICATIONS  HYDROmorphone  Injectable 0.5 milliGRAM(s) IV Push every 3 hours PRN  ondansetron Injectable 4 milliGRAM(s) IV Push every 6 hours PRN      VITAL SIGNS: Last 24 Hours  T(C): 35.1 (19 Oct 2018 14:51), Max: 36.9 (18 Oct 2018 21:29)  T(F): 95.2 (19 Oct 2018 14:51), Max: 98.4 (18 Oct 2018 21:29)  HR: 63 (19 Oct 2018 14:51) (63 - 68)  BP: 90/54 (19 Oct 2018 14:51) (90/54 - 104/54)  BP(mean): --  RR: 16 (19 Oct 2018 14:51) (16 - 18)  SpO2: 100% (19 Oct 2018 07:30) (100% - 100%)    LABS:                        8.3    25.48 )-----------( 69       ( 19 Oct 2018 06:47 )             25.2     10-19    140  |  88<L>  |  76<HH>  ----------------------------<  58<L>  5.9<H>   |  13<L>  |  5.4<HH>    Ca    9.0      19 Oct 2018 06:47  Phos  8.7     10-19  Mg     2.4     10-19    TPro  5.8<L>  /  Alb  3.4<L>  /  TBili  13.3<H>  /  DBili  8.4<H>  /  AST  1360<H>  /  ALT  308<H>  /  AlkPhos  415<H>  10-19    PT/INR - ( 18 Oct 2018 14:34 )   PT: 20.10 sec;   INR: 1.76 ratio         PTT - ( 18 Oct 2018 14:34 )  PTT:63.9 sec              RADIOLOGY:      PHYSICAL EXAM:  GENERAL: Lethargic, disoriented   HEENT: Atraumatic, Normocephalic. Sclera icteric  PULMONARY: Clear to auscultation bilaterally, no rales appreciated. On O2  CARDIOVASCULAR: Regular rate and rhythm; No murmurs, rubs, or gallops  GASTROINTESTINAL: Significant distention. Unable to assess tenderness. Bowel sounds present. (+) fluid wave  MUSCULOSKELETAL:  2+ Peripheral Pulses, No clubbing, no edema bilateral LE. No tenderness to palpation.  NEUROLOGY: non-focal

## 2018-10-19 NOTE — PROGRESS NOTE ADULT - ASSESSMENT
ADMISSION SUMMARY  72 y/o M with PMH of HTN, BPH, PTSD, DLD, CKD 3 (40), 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     ASSESSMENT & PLAN  # Ascites s/p diagnostic/therapeutic paracentesis (10/12) - worsening  -Probably malignant, hx of heavy alcohol use, neuroendocrine tumor with mets to liver  -Initial CT abd/pel (10/10)- moderate to large volume ascites. S/p paracentesis by IR on (10/12) with 4L of madyson color fluid successfully drained, 120cc of fluid removed and sent for culture and cytology, but lab unable to receive   -Repeat U/S (10/16) with ascites similar in amount to CT  -Patient went down to IR suite for tap today but unable to reach family for consent  -Consent later obtained over telephone - will attempt bedside THERAPEUTIC paracentesis later today or tomorrow based on coags    # RANULFO on CKD III - worsening with high anion gap metabolic acidosis on RRT  -Likely hepatorenal syndrome vs. medical compartment syndrome given ascites. CT abd/pel (10/10) showed no signs of hydronephrosis  -Nephro following - RRT initiated yesterday through R groin [place], HD again this AM  -S/p 4 days of Albumin, continue octreotide 100 mcg BID, increased Renagel 2 tab q8h  -Oligoanuric past few days - Godinez removed, repeat U/S (10/18) showed no hydronephrosis but unable to assess bladder due to ascites  -Repeat STAT U/S kidney/bladder after tap to assess retention in bladder  -NO MORE RRT AS PER FAMILY REQUEST     # Acute bilateral posterior tibial DVT  -Hep drip @ 1000 units previous therapeutic PTT  -Held for [place]/paracentesis  -Now supratherapeutic INR due to liver disease anyway    # Hypertension - controlled  -Hold BP meds for borderline hypotension now    # Hyperbilirubinemia, Transaminitis - worsening  -Likely secondary to mets vs. increasing hypotension  -GI following - appreciate recs - will f/u with fluids if tapped today    # Constipation - resolved  -Continue bowel regimen Senna, Colace as tolerated    # DLD  -Hold Lipitor 10 mg qHS for transaminitis    # Hx of BPH, insomnia,   -PO meds on hold for lethargy and discomfort     # DVT Prophylaxis  -Coagulopathic from liver disease    # Disposition  -Code status: DNR/DNI  -Joint family meeting with wife, grandson, stepson, Palliative care team discussion today. Family would like DNR/DNI, no further hemodialysis, will still try for paracentesis for therapeutic purposes. Hospice eval pending ADMISSION SUMMARY  74 y/o M with PMH of HTN, BPH, PTSD, DLD, CKD 3 (40), metastatic neuroendocrine tumor of GB (on chemo w/ topotecan) who presented for abdominal bloating and discomfort x 5 days found to have moderate to large volume ascites on imaging and ARNULFO on CKD.     ASSESSMENT & PLAN  # Ascites s/p diagnostic/therapeutic paracentesis (10/12) - worsening  -Probably malignant, hx of heavy alcohol use, neuroendocrine tumor with mets to liver  -Initial CT abd/pel (10/10)- moderate to large volume ascites. S/p paracentesis by IR on (10/12) with 4L of madyson color fluid successfully drained, 120cc of fluid removed and sent for culture and cytology, but lab unable to receive   -Repeat U/S (10/16) with ascites similar in amount to CT  -Patient went down to IR suite for tap today but unable to reach family for consent  -Consent later obtained over telephone - will attempt bedside THERAPEUTIC paracentesis later today or tomorrow based on coags  -Dilaudid 0.5 mg IV q3h PRN for pain/discomfort    # RANULFO on CKD III - worsening with high anion gap metabolic acidosis on RRT  -Likely hepatorenal syndrome vs. medical compartment syndrome given ascites. CT abd/pel (10/10) showed no signs of hydronephrosis  -Nephro following - RRT initiated yesterday through R groin [place], HD again this AM  -S/p 4 days of Albumin, continue octreotide 100 mcg BID, increased Renagel 2 tab q8h  -Oligoanuric past few days - Godinez removed, repeat U/S (10/18) showed no hydronephrosis but unable to assess bladder due to ascites  -Repeat STAT U/S kidney/bladder after tap to assess retention in bladder  -NO MORE RRT AS PER FAMILY REQUEST     # Acute bilateral posterior tibial DVT  -Hep drip @ 1000 units previous therapeutic PTT  -Held for [place]/paracentesis  -Now supratherapeutic INR due to liver disease anyway    # Hypertension - controlled  -Hold BP meds for borderline hypotension now    # Hyperbilirubinemia, Transaminitis - worsening  -Likely secondary to mets vs. increasing hypotension  -GI following - appreciate recs - will f/u with fluids if tapped today    # Constipation - resolved  -Continue bowel regimen Senna, Colace as tolerated    # DLD  -Hold Lipitor 10 mg qHS for transaminitis    # Hx of BPH, insomnia,   -PO meds on hold for lethargy and discomfort     # DVT Prophylaxis  -Coagulopathic from liver disease    # Disposition  -Code status: DNR/DNI  -Joint family meeting with wife, grandson, maralon, Palliative care team discussion today. Family would like DNR/DNI, no further hemodialysis, will still try for paracentesis for therapeutic purposes. Hospice eval pending  -Savanah Mites (wife): [phone] if unable to reach call [phone]

## 2018-10-19 NOTE — PROGRESS NOTE ADULT - ASSESSMENT
ADMISSION SUMMARY  74 y/o M with PMH of HTN, BPH, PTSD, DLD, CKD 3 (40), 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     ASSESSMENT & PLAN  # Ascites s/p diagnostic/therapeutic paracentesis (10/12) .- worsened.   -Possibly malignant, hx of heavy alcohol use, neuroendocrine tumor with mets to liver  -Initial CT abd/pel (10/10)- moderate to large volume ascites. S/p paracentesis by IR on (10/12) with 4L of madyson color fluid successfully drained, 120cc of fluid removed and sent for culture and cytology, but lab unable to receive   - Might benefit from Tap .  - will try at bedside.    # RANULFO on CKD III - worsening with high anion gap metabolic acidosis on RRT  -Likely hepatorenal syndrome vs. medical compartment syndrome given ascites. CT abd/pel (10/10) showed no signs of hydronephrosis  -Cr worsening 5.6 (10/16) with bicarb 14 (10/16)  -Nephro following - RRT initiated yesterday through R groin [place], HD again this AM  -S/p 4 days of Albumin, continue octreotide 100 mcg BID, increased Renagel 2 tab q8h  -Oligoanuric past few days - Godinez removed, repeat U/S (10/18) showed no hydronephrosis but unable to assess bladder due to ascites  -Repeat U/S kidney/bladder after tap  -Continue D5W with 150 mEq bicarb @ 60 cc/hr for bicarb   -F/u repeat lytes from HD    # Acute bilateral posterior tibial DVT  -Hep drip @ 1000 units previous therapeutic PTT  -Held for [place]/paracentesis  -Now supratherapeutic INR due to liver disease anyway    # Hypertension - controlled  -Continue metoprolol succinate ER 50 mg PO daily, hold if SBP <100   -Nifedipine d/c'ed earlier given hypotension - continue to hold    # Hyperbilirubinemia, Transaminitis - worsening  -GI following - will resend paracentesis fluid for analysis following tap, f/u liver disease workup requested by GI   -TEDDY positive and iron studies abnormal  -F/u repeat labs today    # Constipation - resolved  -Continue bowel regimen Senna, Colace    # DLD  -Hold Lipitor 10 mg qHS for transaminitis    # Hx of BPH - stable  -Continue Finasteride 5 mg PO daily, Tamsulosin 0.4 mg PO qHS    # Hx of Insomnia  -Melatonin 3 mg PO qHS  -Trazadone 50 mg PO daily    # DVT Prophylaxis  -Coagulopathic from liver disease    # GI Prophylaxis  -Protonix 40 mg PO daily    # Disposition  -Code status: as per discussion with wife and family, FULL CODE as of now. Aware cancer is metastasized even on chemo and no further treatment is recommended by oncologist, but would like to see if he improves/symptomatically feels better with dialysis and paracentesis - aware improvement would be temporary, if unimproved will likely make hospice/comfort  -As per palliative care, patient is more of a candidate for hospice. Wife and family open to speaking with hospice to discuss options    Overall prognosis poor.

## 2018-10-19 NOTE — PROGRESS NOTE ADULT - ASSESSMENT
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    # RANULFO ; medical compartment syndrome vs HRS   # ct noted, large ascites s/p paracentesis, no hydro, planned for paracentesis today appreciate IR note   # follow repeat INR   # bladder sono noted could not visualize bladder   # oncology notes appreciated  # on  renagel 1 tablet q 8, increase to 2 q 8   # hd today, again for hyperkalemia and acidosis standard bath, uf 1 liter as tolerated   # overall prognosis poor   # hospice on case

## 2018-10-19 NOTE — PROGRESS NOTE ADULT - SUBJECTIVE AND OBJECTIVE BOX
Nephrology progress note    Patient is seen and examined, events over the last 24 h noted .    Allergies:  No Known Allergies    Hospital Medications:   MEDICATIONS  (STANDING):  aspirin  chewable 81 milliGRAM(s) Oral daily  chlorhexidine 4% Liquid 1 Application(s) Topical <User Schedule>  ciprofloxacin   IVPB 200 milliGRAM(s) IV Intermittent every 24 hours  dextrose 5% 1000 milliLiter(s) (60 mL/Hr) IV Continuous <Continuous>  docusate sodium 100 milliGRAM(s) Oral three times a day  finasteride 5 milliGRAM(s) Oral daily  melatonin 3 milliGRAM(s) Oral at bedtime  metoprolol succinate ER 50 milliGRAM(s) Oral daily  metroNIDAZOLE  IVPB 500 milliGRAM(s) IV Intermittent every 8 hours  octreotide  Injectable 100 MICROGram(s) SubCutaneous two times a day  pantoprazole    Tablet 40 milliGRAM(s) Oral before breakfast  senna 1 Tablet(s) Oral daily  senna 1 Tablet(s) Oral at bedtime  sevelamer hydrochloride 1600 milliGRAM(s) Oral three times a day with meals  tamsulosin 0.4 milliGRAM(s) Oral at bedtime  traZODone 50 milliGRAM(s) Oral daily        VITALS:  T(F): 96.8 (10-19-18 @ 06:28), Max: 98.4 (10-18-18 @ 21:29)  HR: 64 (10-19-18 @ 06:28)  BP: 100/55 (10-19-18 @ 06:28)  RR: 18 (10-19-18 @ 06:28)  SpO2: --  Wt(kg): --    10-17 @ 07:01  -  10-18 @ 07:00  --------------------------------------------------------  IN: 0 mL / OUT: 950 mL / NET: -950 mL    10-18 @ 07:01  -  10-19 @ 07:00  --------------------------------------------------------  IN: 60 mL / OUT: 1000 mL / NET: -940 mL          PHYSICAL EXAM:  Constitutional: NAD  HEENT: anicteric sclera, oropharynx clear, MMM  Neck: No JVD  Respiratory: CTAB, no wheezes, rales or rhonchi  Cardiovascular: S1, S2, RRR  Gastrointestinal: BS+, soft, NT/ND  Extremities: No cyanosis or clubbing. No peripheral edema  :  No curtis.   Skin: No rashes    LABS:  10-19    140  |  88<L>  |  76<HH>  ----------------------------<  58<L>  5.9<H>   |  13<L>  |  5.4<HH>    Ca    9.0      19 Oct 2018 06:47  Phos  8.7     10-19  Mg     2.4     10-19    TPro  5.8<L>  /  Alb  3.4<L>  /  TBili  13.3<H>  /  DBili  8.4<H>  /  AST  1360<H>  /  ALT  308<H>  /  AlkPhos  415<H>  10-19                          8.3    25.48 )-----------( 69       ( 19 Oct 2018 06:47 )             25.2       Urine Studies:      RADIOLOGY & ADDITIONAL STUDIES: Nephrology progress note    Patient is seen and examined, events over the last 24 h noted .  lethargic on IVF now     Allergies:  No Known Allergies    Hospital Medications:   MEDICATIONS  (STANDING):  aspirin  chewable 81 milliGRAM(s) Oral daily  chlorhexidine 4% Liquid 1 Application(s) Topical <User Schedule>  ciprofloxacin   IVPB 200 milliGRAM(s) IV Intermittent every 24 hours  dextrose 5% 1000 milliLiter(s) (60 mL/Hr) IV Continuous <Continuous>  docusate sodium 100 milliGRAM(s) Oral three times a day  finasteride 5 milliGRAM(s) Oral daily  melatonin 3 milliGRAM(s) Oral at bedtime  metoprolol succinate ER 50 milliGRAM(s) Oral daily  metroNIDAZOLE  IVPB 500 milliGRAM(s) IV Intermittent every 8 hours  octreotide  Injectable 100 MICROGram(s) SubCutaneous two times a day  pantoprazole    Tablet 40 milliGRAM(s) Oral before breakfast  senna 1 Tablet(s) Oral daily  senna 1 Tablet(s) Oral at bedtime  sevelamer hydrochloride 1600 milliGRAM(s) Oral three times a day with meals  tamsulosin 0.4 milliGRAM(s) Oral at bedtime  traZODone 50 milliGRAM(s) Oral daily        VITALS:  T(F): 96.8 (10-19-18 @ 06:28), Max: 98.4 (10-18-18 @ 21:29)  HR: 64 (10-19-18 @ 06:28)  BP: 100/55 (10-19-18 @ 06:28)  RR: 18 (10-19-18 @ 06:28)      10-17 @ 07:01  -  10-18 @ 07:00  --------------------------------------------------------  IN: 0 mL / OUT: 950 mL / NET: -950 mL    10-18 @ 07:01  -  10-19 @ 07:00  --------------------------------------------------------  IN: 60 mL / OUT: 1000 mL / NET: -940 mL          PHYSICAL EXAM:  Constitutional: lethargic weak   HEENT: anicteric sclera, oropharynx clear, MMM  Neck: No JVD  Respiratory: CTAB, no wheezes, rales or rhonchi  Cardiovascular: S1, S2, RRR  Gastrointestinal: BS+,distended abdomen   Extremities: No cyanosis or clubbing. trace edema lower ext   :  No curtis.   Skin: No rashes    LABS:  10-19    140  |  88<L>  |  76<HH>  ----------------------------<  58<L>  5.9<H>   |  13<L>  |  5.4<HH>    Ca    9.0      19 Oct 2018 06:47  Phos  8.7     10-19  Mg     2.4     10-19    TPro  5.8<L>  /  Alb  3.4<L>  /  TBili  13.3<H>  /  DBili  8.4<H>  /  AST  1360<H>  /  ALT  308<H>  /  AlkPhos  415<H>  10-19                          8.3    25.48 )-----------( 69       ( 19 Oct 2018 06:47 )             25.2       Urine Studies:      RADIOLOGY & ADDITIONAL STUDIES:  < from: US Kidney and Bladder (10.18.18 @ 04:44) >    IMPRESSION:  1.  Poorly visualized urinary bladder due to surrounding ascites. Debris   within the urinary bladder.    < end of copied text >

## 2018-10-19 NOTE — CHART NOTE - NSCHARTNOTEFT_GEN_A_CORE
Patient taken down to IR suite for paracentesis. No family at bedside. Numerous attempts to reach wife at contact number in the chart [phone] by IR as well as myself (left a message with a call back number as well).    Will wait if family comes to get consent for bedside paracentesis possibly later today.

## 2018-10-19 NOTE — PROGRESS NOTE ADULT - ATTENDING COMMENTS
Patient seen and examined independently of resident. Case discussed with housestaff, nursing and patient
Patient seen and evaluated at the bedside  difficult curtis placement last night  bladder sonogram shows a moderately distended bladder - difficult to fully assess due to ascites  ESRD - ? if making much urine  renal sonogram was reviewed by me - no signs of hydronephrosis  would avoid further manipulation of the urethra for now given likely trauma  once ascites is drained bladder sonogram will give clearer picture of bladder status  if retention remains, could consider cysto bedside with curtis placement over wire.
Patient seen and examined. Had not undergone paracentesis yet when I had seen him however I reviewed images of bladder sono and it does not appear like the bladder is distended. we will follow up post paracentesis sono however this patient is unlikely in retention.
Pt seen and examined, agree with above A/p.  Pt's overall condition has declined significantly. The metastatic disease is progressing.  I had a detailed discussion with pt's son and separately with his wife.  Given his disease status, supportive care only is the best option.  Family is requesting palliative care consult.  After  discussion, both his wife and his grandson decided not to pursue dialysis.  They agreed for comfort care only  They were also informed about hospice options.
pt seen, family at bedside.  He is undergoing [place] placement per family's wishes.  Continue supportive measures.  Prognosis poor.  plan d/w house staff
Pt seen and examined.  Agree with above A/P.  Discussed situation with family.  continue supportive measures
Pt seen and examined.  agree with above A/P.  Pt's clinical picture is consistent with progression.  He was due to have a PET scan repeated.  Will consider Immunotherapy with nivolumab.  Discussed with pt regarding possible need for repeat paracentesis and need to change chemotherapy

## 2018-10-19 NOTE — CHART NOTE - NSCHARTNOTEFT_GEN_A_CORE
74 y/o M with PMH of HTN, BPH, PTSD, DLD, CKD 3 (40), 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     Patient had diagnostic/therapeutic paracentesis (10/12) with recurrence of ascites complicated by acute renal failure. RRT initiated on 10/17/18 with [place] placement. Plan was was for repeat diagnostic/therapeutic paracentesis by IR today however unable to reach wife for consent and IR recommends bedside tap. Family aware as per heme/onc cancer is worsening on third-line therapy with liver mets, no further therapy recommended. Goals of care discussion with family today, patient will be comfort care only, DNR/DNI, no further dialysis, still would like palliative paracentesis. Please see progress note for full course and details.     -Dilaudid 0.5 mg IV q3h PRN for pain/discomfort/restlessness    [  ] Repeat labs CBC, coags in AM for therapeutic paracentesis tomorrow (consent in chart) - PLEASE DRAW LABS THROUGH PORT ON LEFT CHEST WALL  [  ] Oligoanuric with condom catheter - STAT U/S kidney/bladder to see if retaining as urology unable to insert Godinez and possible retention could be contributing to abdominal discomfort, contact urology after  [  ] Patient is lethargic, intermittently oriented  [  ] Hospice eval pending    Contact Savanah Triplett (wife): [phone] if unable to reach call [phone]

## 2018-10-19 NOTE — PROGRESS NOTE ADULT - PROVIDER SPECIALTY LIST ADULT
Gastroenterology
Heme/Onc
Hospitalist
Internal Medicine
Intervent Radiology
Nephrology
Palliative Care
Urology
Urology
Internal Medicine
Nephrology

## 2018-10-19 NOTE — CHART NOTE - NSCHARTNOTEFT_GEN_A_CORE
Registered Dietitian Follow-Up     Patient Profile Reviewed                           Yes [x]   No []     Nutrition History Previously Obtained        Yes [x]  No []       Pertinent Subjective Information: Pt asleep at time of visit. Spoke to PCA who reports pt did not eat anything today.      Pertinent Medical Interventions: Pt is very ill with stage 4 neuroendocrine cancer with liver mets and renal failure. Pt is very uncomfortable (+) abdominal [ain and leg pain (+) grimacing. Noted with jaundiced sclera. Pts grandson at bedside. Wife spoke to medical team and is decision maker. She and her  had decided on dialysis and a tap for comfort. Hospice consulted to review overall plan. Grandson expressed family turmoil between himself and his mother in law.      Diet order: renal restrictions, Ensure Compact 2x/day, Ensure Pudding 2x/day     Anthropometrics:  - Ht. 69"  - Wt. - wt fluctuations from 80.7-87.5 kg during LOS, pt with edema - will continue to monitor wts      Pertinent Lab Data: (10/19) WBC 25.48, RBC 3.13, H/H 8.3/25.2, potassium 5.9, Chloride 88, BUN 76, creatinine 5.4, glucose 58, albumin 3.4, alkaline phos 415      Pertinent Meds: abx, dextrose IV fluids, insulin, hydromorphone, sevelamer hydrochloride, ondansetron, octreotide, senna, docusate, metoprolol, pantoprazole, senna      Physical Findings:  - Appearance: WDL per EMR - asleep at time of visit   - GI function: 10/18, abdominal discomfort per EMR   - Oral/Mouth cavity: no issues noted   - Skin: intact, BS =14      Nutrition Requirements  Weight Used:  82.5 kg (lowest wt since admit) - needs continued      Energy needs: (2175 kcal/day = 30 kcal/kg IBW 2/2 unintended wt loss)   Protein needs: (58 g/day = 0.8 g/kg IBW as pt with CKD III and renal labs elevated)  Fluid needs: per LIP     Nutrient Intake: not meeting needs      [x] Previous Nutrition Diagnosis: Inadequate oral intake            [x] Ongoing          [] Resolved    Nutrition Intervention Meals and Snacks, Medical Food Supplements   Continue current diet and supplement order.   Monitor plan of care and need to change supplement regimen.      Goal/Expected Outcome: Pt to consume >50% of meals, snacks, supplements within 4 days     Indicator/Monitoring:  RD to monitor diet order, energy intake, renal profile, body composition, NFPF.

## 2018-10-19 NOTE — PROGRESS NOTE ADULT - SUBJECTIVE AND OBJECTIVE BOX
73y old Male with PMH of  neuroendocrine tumor with metastatic disease, severe retroperitoneal lymphoadenopathy, RANULFO p/w  abdominal ascites ?urinary retention, traumatic Godinez, failed Godinez catheter placement-- ?bladder neck contracture.  Pt. seen and examined at bedside in moderate distress awaits paracentesis this AM.     Vital Signs Last 24 Hrs  T(C): 36 (19 Oct 2018 06:28), Max: 36.9 (18 Oct 2018 21:29)  T(F): 96.8 (19 Oct 2018 06:28), Max: 98.4 (18 Oct 2018 21:29)  HR: 64 (19 Oct 2018 06:28) (64 - 75)  BP: 100/55 (19 Oct 2018 06:28) (100/55 - 129/66)  RR: 18 (19 Oct 2018 06:28) (17 - 19)         General: moderate distress, more alert today, answering questions  HEENT: NC/AT, EOMI  Abd: Very distended 2/2 abdominal ascites, no suprapubic tenderness to palpation. no flank pain on palpation  : uncircumcised male genitalia, + blood over glans, B/L scrotum no TTP B/L testis descended no TTP, + condom catheter not draining.  Extremities: + edema .        LABS:                        8.3    25.48 )-----------( 69       ( 19 Oct 2018 06:47 )             25.2     10-19    140  |  88<L>  |  76<HH>  ----------------------------<  58<L>  5.9<H>   |  13<L>  |  5.4<HH>    Ca    9.0      19 Oct 2018 06:47  Phos  8.7     10-19  Mg     2.4     10-19    TPro  5.8<L>  /  Alb  3.4<L>  /  TBili  13.3<H>  /  DBili  8.4<H>  /  AST  1360<H>  /  ALT  308<H>  /  AlkPhos  415<H>  10-19       < from: US Kidney and Bladder (10.18.18 @ 04:44) >    EXAM:  US KIDNEYS AND BLADDER            PROCEDURE DATE:  10/18/2018            INTERPRETATION:  CLINICAL STATEMENT: Blood clots in the urine and   retention.    COMPARISON: CT of the abdomen and pelvis on 10/10/2018.    PROCEDURE: Retroperitoneal ultrasound was performed.    FINDINGS:    RIGHT KIDNEY:  Measures 10.3 cm.  No hydronephrosis.  Right renal cysts   measuring up to 3.5 x 3.3 cm. No solid mass, calculus or perinephric   fluid collection. Vascular flow is demonstrated.     LEFT KIDNEY:   Measures 9.9 cm.  No hydronephrosis. Left interpolar   region cyst measures 1.3 x 1.2 cm. No solid mass, calculus or perinephric   fluid collection. Vascular flow is demonstrated.     URINARY BLADDER: Poorly visualized due to surrounding ascites. There is   debris within the urinary bladder. Ureteral jets could not be well   visualized.     OTHER: Moderate abdominopelvic ascites.    IMPRESSION:  1.  Poorly visualized urinary bladder due to surrounding ascites. Debris   within the urinary bladder.    < end of copied text >

## 2018-10-19 NOTE — GOALS OF CARE CONVERSATION - PERSONAL ADVANCE DIRECTIVE - TREATMENT GUIDELINE COMMENT
DNR/DNI, comfort measures only, stop blood draws after procedure, comfort feeds only , no IV fluids, no ABX.  Hospice Consult entered.

## 2018-10-19 NOTE — GOALS OF CARE CONVERSATION - PERSONAL ADVANCE DIRECTIVE - CONVERSATION DETAILS
Palliative and Medicine met with patient's spouse and grandsons.  Medical update provided to family and family verbalized understanding of patient's overall poor condition.  Family reports they just want him not to suffer anymore.  Family would like to bring patient home on Hospice Care.  Pt now comfort measures only DNR/DNI, hospice consult put in.

## 2018-10-19 NOTE — PROGRESS NOTE ADULT - SUBJECTIVE AND OBJECTIVE BOX
Patient is a 73y old  Male who presents with a chief complaint of Abdominal Distention (19 Oct 2018 10:11)      Subjective:      Vital Signs Last 24 Hrs  T(C): 36 (19 Oct 2018 06:28), Max: 36.9 (18 Oct 2018 21:29)  T(F): 96.8 (19 Oct 2018 06:28), Max: 98.4 (18 Oct 2018 21:29)  HR: 64 (19 Oct 2018 06:28) (64 - 68)  BP: 100/55 (19 Oct 2018 06:28) (100/55 - 104/54)  BP(mean): --  RR: 18 (19 Oct 2018 06:28) (18 - 18)  SpO2: 100% (19 Oct 2018 07:30) (100% - 100%)    PHYSICAL EXAM  General: adult in NAD  HEENT: clear oropharynx, anicteric sclera, pink conjunctiva  Neck: supple  CV: normal S1/S2 with no murmur rubs or gallops  Lungs: positive air movement b/l ant lungs,clear to auscultation, no wheezes, no rales  Abdomen: soft non-tender non-distended, no hepatosplenomegaly  Ext: no clubbing cyanosis or edema  Skin: no rashes and no petechiae  Neuro: alert and oriented X 4, no focal deficits    MEDICATIONS  (STANDING):  albumin human 25% IVPB 50 milliLiter(s) IV Intermittent once  aspirin  chewable 81 milliGRAM(s) Oral daily  chlorhexidine 4% Liquid 1 Application(s) Topical <User Schedule>  ciprofloxacin   IVPB 200 milliGRAM(s) IV Intermittent every 24 hours  dextrose 5% 1000 milliLiter(s) (60 mL/Hr) IV Continuous <Continuous>  docusate sodium 100 milliGRAM(s) Oral three times a day  finasteride 5 milliGRAM(s) Oral daily  melatonin 3 milliGRAM(s) Oral at bedtime  metoprolol succinate ER 50 milliGRAM(s) Oral daily  metroNIDAZOLE  IVPB 500 milliGRAM(s) IV Intermittent every 8 hours  octreotide  Injectable 100 MICROGram(s) SubCutaneous two times a day  pantoprazole    Tablet 40 milliGRAM(s) Oral before breakfast  senna 1 Tablet(s) Oral daily  senna 1 Tablet(s) Oral at bedtime  sevelamer hydrochloride 1600 milliGRAM(s) Oral three times a day with meals  tamsulosin 0.4 milliGRAM(s) Oral at bedtime  traZODone 50 milliGRAM(s) Oral daily    MEDICATIONS  (PRN):  HYDROmorphone  Injectable 0.5 milliGRAM(s) IV Push every 3 hours PRN Moderate Pain (4 - 6)  ondansetron Injectable 4 milliGRAM(s) IV Push every 6 hours PRN Nausea and/or Vomiting      LABS:                          8.3    25.48 )-----------( 69       ( 19 Oct 2018 06:47 )             25.2         Mean Cell Volume : 80.5 fL  Mean Cell Hemoglobin : 26.5 pg  Mean Cell Hemoglobin Concentration : 32.9 g/dL  Auto Neutrophil # : 21.42 K/uL  Auto Lymphocyte # : 0.57 K/uL  Auto Monocyte # : 2.92 K/uL  Auto Eosinophil # : 0.00 K/uL  Auto Basophil # : 0.02 K/uL  Auto Neutrophil % : 84.0 %  Auto Lymphocyte % : 2.2 %  Auto Monocyte % : 11.5 %  Auto Eosinophil % : 0.0 %  Auto Basophil % : 0.1 %      Serial CBC's  10-19 @ 06:47  Hct-25.2 / Hgb-8.3 / Plat-69 / RBC-3.13 / WBC-25.48  Serial CBC's  10-18 @ 08:36  Hct-24.4 / Hgb-8.3 / Plat-62 / RBC-3.17 / WBC-21.97  Serial CBC's  10-16 @ 09:46  Hct-26.7 / Hgb-9.1 / Plat-189 / RBC-3.51 / WBC-19.61      10-19    140  |  88<L>  |  76<HH>  ----------------------------<  58<L>  5.9<H>   |  13<L>  |  5.4<HH>    Ca    9.0      19 Oct 2018 06:47  Phos  8.7     10-19  Mg     2.4     10-19    TPro  5.8<L>  /  Alb  3.4<L>  /  TBili  13.3<H>  /  DBili  8.4<H>  /  AST  1360<H>  /  ALT  308<H>  /  AlkPhos  415<H>  10-19      PT/INR - ( 18 Oct 2018 14:34 )   PT: 20.10 sec;   INR: 1.76 ratio         PTT - ( 18 Oct 2018 14:34 )  PTT:63.9 sec    Ferritin, Serum: 5126 ng/mL (10-15 @ 07:37)      Serum Protein Electrophoresis Interp: Normal Electrophoresis Pattern (10-15 @ 07:37)                      BLOOD SMEAR INTERPRETATION:       RADIOLOGY & ADDITIONAL STUDIES: Patient is a 73y old  Male who presents with a chief complaint of Abdominal Distention (19 Oct 2018 10:11)    Subjective: No overnight events.    Vital Signs Last 24 Hrs  T(C): 36 (19 Oct 2018 06:28), Max: 36.9 (18 Oct 2018 21:29)  T(F): 96.8 (19 Oct 2018 06:28), Max: 98.4 (18 Oct 2018 21:29)  HR: 64 (19 Oct 2018 06:28) (64 - 68)  BP: 100/55 (19 Oct 2018 06:28) (100/55 - 104/54)  BP(mean): --  RR: 18 (19 Oct 2018 06:28) (18 - 18)  SpO2: 100% (19 Oct 2018 07:30) (100% - 100%)    PHYSICAL EXAM  General: chronically-ill appearing  HEENT: NC/AT  Neck: supple  Lungs: positive air movement b/l ant lungs  Abdomen: distended  Skin: no rashes and no petechiae    MEDICATIONS  (STANDING):  albumin human 25% IVPB 50 milliLiter(s) IV Intermittent once  aspirin  chewable 81 milliGRAM(s) Oral daily  chlorhexidine 4% Liquid 1 Application(s) Topical <User Schedule>  ciprofloxacin   IVPB 200 milliGRAM(s) IV Intermittent every 24 hours  dextrose 5% 1000 milliLiter(s) (60 mL/Hr) IV Continuous <Continuous>  docusate sodium 100 milliGRAM(s) Oral three times a day  finasteride 5 milliGRAM(s) Oral daily  melatonin 3 milliGRAM(s) Oral at bedtime  metoprolol succinate ER 50 milliGRAM(s) Oral daily  metroNIDAZOLE  IVPB 500 milliGRAM(s) IV Intermittent every 8 hours  octreotide  Injectable 100 MICROGram(s) SubCutaneous two times a day  pantoprazole    Tablet 40 milliGRAM(s) Oral before breakfast  senna 1 Tablet(s) Oral daily  senna 1 Tablet(s) Oral at bedtime  sevelamer hydrochloride 1600 milliGRAM(s) Oral three times a day with meals  tamsulosin 0.4 milliGRAM(s) Oral at bedtime  traZODone 50 milliGRAM(s) Oral daily    MEDICATIONS  (PRN):  HYDROmorphone  Injectable 0.5 milliGRAM(s) IV Push every 3 hours PRN Moderate Pain (4 - 6)  ondansetron Injectable 4 milliGRAM(s) IV Push every 6 hours PRN Nausea and/or Vomiting      LABS:                          8.3    25.48 )-----------( 69       ( 19 Oct 2018 06:47 )             25.2         Mean Cell Volume : 80.5 fL  Mean Cell Hemoglobin : 26.5 pg  Mean Cell Hemoglobin Concentration : 32.9 g/dL  Auto Neutrophil # : 21.42 K/uL  Auto Lymphocyte # : 0.57 K/uL  Auto Monocyte # : 2.92 K/uL  Auto Eosinophil # : 0.00 K/uL  Auto Basophil # : 0.02 K/uL  Auto Neutrophil % : 84.0 %  Auto Lymphocyte % : 2.2 %  Auto Monocyte % : 11.5 %  Auto Eosinophil % : 0.0 %  Auto Basophil % : 0.1 %      Serial CBC's  10-19 @ 06:47  Hct-25.2 / Hgb-8.3 / Plat-69 / RBC-3.13 / WBC-25.48  Serial CBC's  10-18 @ 08:36  Hct-24.4 / Hgb-8.3 / Plat-62 / RBC-3.17 / WBC-21.97  Serial CBC's  10-16 @ 09:46  Hct-26.7 / Hgb-9.1 / Plat-189 / RBC-3.51 / WBC-19.61      10-19    140  |  88<L>  |  76<HH>  ----------------------------<  58<L>  5.9<H>   |  13<L>  |  5.4<HH>    Ca    9.0      19 Oct 2018 06:47  Phos  8.7     10-19  Mg     2.4     10-19    TPro  5.8<L>  /  Alb  3.4<L>  /  TBili  13.3<H>  /  DBili  8.4<H>  /  AST  1360<H>  /  ALT  308<H>  /  AlkPhos  415<H>  10-19      PT/INR - ( 18 Oct 2018 14:34 )   PT: 20.10 sec;   INR: 1.76 ratio         PTT - ( 18 Oct 2018 14:34 )  PTT:63.9 sec    Ferritin, Serum: 5126 ng/mL (10-15 @ 07:37)    Serum Protein Electrophoresis Interp: Normal Electrophoresis Pattern (10-15 @ 07:37) Patient is a 73y old  Male who presents with a chief complaint of Abdominal Distention (19 Oct 2018 10:11)    Subjective: No overnight events.  Daughter is at bedside.  Reports that patient seems to be rapidly declining over the past few days.    Vital Signs Last 24 Hrs  T(C): 36 (19 Oct 2018 06:28), Max: 36.9 (18 Oct 2018 21:29)  T(F): 96.8 (19 Oct 2018 06:28), Max: 98.4 (18 Oct 2018 21:29)  HR: 64 (19 Oct 2018 06:28) (64 - 68)  BP: 100/55 (19 Oct 2018 06:28) (100/55 - 104/54)  BP(mean): --  RR: 18 (19 Oct 2018 06:28) (18 - 18)  SpO2: 100% (19 Oct 2018 07:30) (100% - 100%)    PHYSICAL EXAM  General: chronically-ill appearing, patient is confused, minimally verbal, trying to get out of bed  HEENT: NC/AT  Neck: supple  Lungs: positive air movement b/l ant lungs  Abdomen: distended  Skin: no rashes and no petechiae    MEDICATIONS  (STANDING):  albumin human 25% IVPB 50 milliLiter(s) IV Intermittent once  aspirin  chewable 81 milliGRAM(s) Oral daily  chlorhexidine 4% Liquid 1 Application(s) Topical <User Schedule>  ciprofloxacin   IVPB 200 milliGRAM(s) IV Intermittent every 24 hours  dextrose 5% 1000 milliLiter(s) (60 mL/Hr) IV Continuous <Continuous>  docusate sodium 100 milliGRAM(s) Oral three times a day  finasteride 5 milliGRAM(s) Oral daily  melatonin 3 milliGRAM(s) Oral at bedtime  metoprolol succinate ER 50 milliGRAM(s) Oral daily  metroNIDAZOLE  IVPB 500 milliGRAM(s) IV Intermittent every 8 hours  octreotide  Injectable 100 MICROGram(s) SubCutaneous two times a day  pantoprazole    Tablet 40 milliGRAM(s) Oral before breakfast  senna 1 Tablet(s) Oral daily  senna 1 Tablet(s) Oral at bedtime  sevelamer hydrochloride 1600 milliGRAM(s) Oral three times a day with meals  tamsulosin 0.4 milliGRAM(s) Oral at bedtime  traZODone 50 milliGRAM(s) Oral daily    MEDICATIONS  (PRN):  HYDROmorphone  Injectable 0.5 milliGRAM(s) IV Push every 3 hours PRN Moderate Pain (4 - 6)  ondansetron Injectable 4 milliGRAM(s) IV Push every 6 hours PRN Nausea and/or Vomiting      LABS:                          8.3    25.48 )-----------( 69       ( 19 Oct 2018 06:47 )             25.2         Mean Cell Volume : 80.5 fL  Mean Cell Hemoglobin : 26.5 pg  Mean Cell Hemoglobin Concentration : 32.9 g/dL  Auto Neutrophil # : 21.42 K/uL  Auto Lymphocyte # : 0.57 K/uL  Auto Monocyte # : 2.92 K/uL  Auto Eosinophil # : 0.00 K/uL  Auto Basophil # : 0.02 K/uL  Auto Neutrophil % : 84.0 %  Auto Lymphocyte % : 2.2 %  Auto Monocyte % : 11.5 %  Auto Eosinophil % : 0.0 %  Auto Basophil % : 0.1 %      Serial CBC's  10-19 @ 06:47  Hct-25.2 / Hgb-8.3 / Plat-69 / RBC-3.13 / WBC-25.48  Serial CBC's  10-18 @ 08:36  Hct-24.4 / Hgb-8.3 / Plat-62 / RBC-3.17 / WBC-21.97  Serial CBC's  10-16 @ 09:46  Hct-26.7 / Hgb-9.1 / Plat-189 / RBC-3.51 / WBC-19.61      10-19    140  |  88<L>  |  76<HH>  ----------------------------<  58<L>  5.9<H>   |  13<L>  |  5.4<HH>    Ca    9.0      19 Oct 2018 06:47  Phos  8.7     10-19  Mg     2.4     10-19    TPro  5.8<L>  /  Alb  3.4<L>  /  TBili  13.3<H>  /  DBili  8.4<H>  /  AST  1360<H>  /  ALT  308<H>  /  AlkPhos  415<H>  10-19      PT/INR - ( 18 Oct 2018 14:34 )   PT: 20.10 sec;   INR: 1.76 ratio         PTT - ( 18 Oct 2018 14:34 )  PTT:63.9 sec    Ferritin, Serum: 5126 ng/mL (10-15 @ 07:37)    Serum Protein Electrophoresis Interp: Normal Electrophoresis Pattern (10-15 @ 07:37)

## 2018-10-19 NOTE — PROGRESS NOTE ADULT - ASSESSMENT
72 yo male with PMH of HTN, BPH, PTSD, DLD, CKD 3, metastatic neuroendocrine tumor of GB (on chemo w/ topotecan) presents for abdominal bloating and discomfort x 5 days.  Found to have RANULFO on CKD 3-concern for hepatorenal syndrome, especially in light of abdominal distention and discomfort.  Nephrology is following the patient.  Newly initiated on HD.    1. Metastatic neuroendocrine tumor of gallbladder-received palliative chemotherapy with topotecan, carboplatin/etoposide, and FOLFIRI).  In light of above medical complications, patient is a poor candidate for further chemotherapy, and comfort care would be appropriate.  Overall prognosis is poor. 72 yo male with PMH of HTN, BPH, PTSD, DLD, CKD 3, metastatic neuroendocrine tumor of GB (on chemo w/ topotecan) presents for abdominal bloating and discomfort x 5 days.  Found to have RANULFO on CKD 3-concern for hepatorenal syndrome, especially in light of abdominal distention and discomfort.  Nephrology is following the patient.  Newly initiated on HD.    1. Metastatic neuroendocrine tumor of gallbladder-received palliative chemotherapy with topotecan, carboplatin/etoposide, and FOLFIRI.  In light of above medical complications, patient is a poor candidate for further chemotherapy, and comfort care would be appropriate.  Overall prognosis is poor.

## 2018-10-20 RX ADMIN — HYDROMORPHONE HYDROCHLORIDE 0.5 MILLIGRAM(S): 2 INJECTION INTRAMUSCULAR; INTRAVENOUS; SUBCUTANEOUS at 03:11

## 2018-10-20 RX ADMIN — HYDROMORPHONE HYDROCHLORIDE 0.5 MILLIGRAM(S): 2 INJECTION INTRAMUSCULAR; INTRAVENOUS; SUBCUTANEOUS at 00:20

## 2018-10-20 NOTE — DISCHARGE NOTE FOR THE EXPIRED PATIENT - HOSPITAL COURSE
72 y/o M with PMH of HTN, BPH, PTSD, DLD, CKD 3 (40), 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   pt had metastatic neuroendocrine tumor of GB with malignant ascites.  pt was DNR / DNI , pronounced dead 10/20/2018 at 4:40 AM.

## 2018-10-21 LAB — SMOOTH MUSCLE AB SER-ACNC: ABNORMAL

## 2018-11-01 DIAGNOSIS — E87.2 ACIDOSIS: ICD-10-CM

## 2018-11-01 DIAGNOSIS — C7A.1 MALIGNANT POORLY DIFFERENTIATED NEUROENDOCRINE TUMORS: ICD-10-CM

## 2018-11-01 DIAGNOSIS — F43.10 POST-TRAUMATIC STRESS DISORDER, UNSPECIFIED: ICD-10-CM

## 2018-11-01 DIAGNOSIS — R18.0 MALIGNANT ASCITES: ICD-10-CM

## 2018-11-01 DIAGNOSIS — N40.0 BENIGN PROSTATIC HYPERPLASIA WITHOUT LOWER URINARY TRACT SYMPTOMS: ICD-10-CM

## 2018-11-01 DIAGNOSIS — C78.7 SECONDARY MALIGNANT NEOPLASM OF LIVER AND INTRAHEPATIC BILE DUCT: ICD-10-CM

## 2018-11-01 DIAGNOSIS — I82.443 ACUTE EMBOLISM AND THROMBOSIS OF TIBIAL VEIN, BILATERAL: ICD-10-CM

## 2018-11-01 DIAGNOSIS — Z79.82 LONG TERM (CURRENT) USE OF ASPIRIN: ICD-10-CM

## 2018-11-01 DIAGNOSIS — K76.7 HEPATORENAL SYNDROME: ICD-10-CM

## 2018-11-01 DIAGNOSIS — I12.9 HYPERTENSIVE CHRONIC KIDNEY DISEASE WITH STAGE 1 THROUGH STAGE 4 CHRONIC KIDNEY DISEASE, OR UNSPECIFIED CHRONIC KIDNEY DISEASE: ICD-10-CM

## 2018-11-01 DIAGNOSIS — E78.5 HYPERLIPIDEMIA, UNSPECIFIED: ICD-10-CM

## 2018-11-01 DIAGNOSIS — N17.9 ACUTE KIDNEY FAILURE, UNSPECIFIED: ICD-10-CM

## 2018-11-01 DIAGNOSIS — N18.3 CHRONIC KIDNEY DISEASE, STAGE 3 (MODERATE): ICD-10-CM

## 2019-01-24 LAB
HCT VFR BLD CALC: 22.5 %
HGB BLD-MCNC: 7.7 G/DL
MCHC RBC-ENTMCNC: 30.3 PG
MCHC RBC-ENTMCNC: 34.2 G/DL
MCV RBC AUTO: 88.6 FL
PLATELET # BLD AUTO: 164 K/UL
PMV BLD: 11.4 FL
RBC # BLD: 2.54 M/UL
RBC # FLD: 16.9 %
WBC # FLD AUTO: 14.29 K/UL

## 2019-09-02 PROBLEM — Z86.59 HISTORY OF POST TRAUMATIC STRESS DISORDER: Status: RESOLVED | Noted: 2017-08-14 | Resolved: 2019-09-02

## 2021-09-08 NOTE — PROGRESS NOTE ADULT - ASSESSMENT
I approved 6 months.   Myrna Casillas MD    72 yo male with PMH of HTN, BPH, PTSD, DLD, CKD 3, metastatic neuroendocrine tumor of GB (on chemo w/ topotecan) presents for abdominal bloating and discomfort x 5 days.  Found to have RANULFO on CKD 3-concern for hepatorenal syndrome, especially in light of abdominal distention and discomfort.  Nephrology is following the patient.  Newly initiated on HD.    1. Metastatic neuroendocrine tumor of gallbladder-on palliative chemotherapy with topotecan.  In light of above complications, recommend palliative care evaluation.  Team discussed with patient and family.  They are open to talking to hospice.    Overall prognosis is poor. 72 yo male with PMH of HTN, BPH, PTSD, DLD, CKD 3, metastatic neuroendocrine tumor of GB (on chemo w/ topotecan) presents for abdominal bloating and discomfort x 5 days.  Found to have RANULFO on CKD 3-concern for hepatorenal syndrome, especially in light of abdominal distention and discomfort.  Nephrology is following the patient.  Newly initiated on HD.    1. Metastatic neuroendocrine tumor of gallbladder-on palliative chemotherapy with topotecan (previously received carboplatin/etoposide and FOLFIRI).  In light of above complications, recommend palliative care evaluation.  Team discussed with patient and family.  They are open to talking to hospice.  Discussed with intern.  Patient will have therapeutic paracentesis and then hemodialysis today.    Overall prognosis is poor.